# Patient Record
Sex: MALE | Race: WHITE | NOT HISPANIC OR LATINO | Employment: OTHER | URBAN - METROPOLITAN AREA
[De-identification: names, ages, dates, MRNs, and addresses within clinical notes are randomized per-mention and may not be internally consistent; named-entity substitution may affect disease eponyms.]

---

## 2017-11-19 ENCOUNTER — OFFICE VISIT (OUTPATIENT)
Dept: URGENT CARE | Facility: URGENT CARE | Age: 67
End: 2017-11-19
Payer: COMMERCIAL

## 2017-11-19 VITALS
BODY MASS INDEX: 25.24 KG/M2 | HEART RATE: 66 BPM | OXYGEN SATURATION: 96 % | SYSTOLIC BLOOD PRESSURE: 110 MMHG | WEIGHT: 166 LBS | DIASTOLIC BLOOD PRESSURE: 70 MMHG | RESPIRATION RATE: 12 BRPM | TEMPERATURE: 98.1 F

## 2017-11-19 DIAGNOSIS — R82.90 NONSPECIFIC FINDING ON EXAMINATION OF URINE: ICD-10-CM

## 2017-11-19 DIAGNOSIS — R30.0 DYSURIA: Primary | ICD-10-CM

## 2017-11-19 LAB
ALBUMIN UR-MCNC: 30 MG/DL
APPEARANCE UR: ABNORMAL
BACTERIA #/AREA URNS HPF: ABNORMAL /HPF
BILIRUB UR QL STRIP: NEGATIVE
COLOR UR AUTO: YELLOW
GLUCOSE UR STRIP-MCNC: NEGATIVE MG/DL
HGB UR QL STRIP: ABNORMAL
KETONES UR STRIP-MCNC: NEGATIVE MG/DL
LEUKOCYTE ESTERASE UR QL STRIP: ABNORMAL
NITRATE UR QL: POSITIVE
PH UR STRIP: 5.5 PH (ref 5–7)
RBC #/AREA URNS AUTO: ABNORMAL /HPF
SOURCE: ABNORMAL
SP GR UR STRIP: <=1.005 (ref 1–1.03)
UROBILINOGEN UR STRIP-ACNC: 0.2 EU/DL (ref 0.2–1)
WBC #/AREA URNS AUTO: >100 /HPF

## 2017-11-19 PROCEDURE — 87086 URINE CULTURE/COLONY COUNT: CPT | Performed by: FAMILY MEDICINE

## 2017-11-19 PROCEDURE — 87186 SC STD MICRODIL/AGAR DIL: CPT | Performed by: FAMILY MEDICINE

## 2017-11-19 PROCEDURE — 87088 URINE BACTERIA CULTURE: CPT | Performed by: FAMILY MEDICINE

## 2017-11-19 PROCEDURE — 81001 URINALYSIS AUTO W/SCOPE: CPT | Performed by: FAMILY MEDICINE

## 2017-11-19 PROCEDURE — 99213 OFFICE O/P EST LOW 20 MIN: CPT | Performed by: FAMILY MEDICINE

## 2017-11-19 NOTE — PROGRESS NOTES
SUBJECTIVE:   Wiley Storey is a 67 year old male who presents to clinic today for the following health issues:      Genitourinary symptoms      Duration: sx came back again last night    Description:  dysuria and urgency, cloudy urine    Intensity:  moderate    Accompanying signs and symptoms (fever/discharge/nausea/vomiting/back or abdominal pain):  Possible prostate pain?    History (frequent UTI's/kidney stones/prostate problems): Just finished medication 1 week ago, does have hx of prostatitis   Sexually active:     Precipitating or alleviating factors: None    Therapies tried and outcome: course of antibiotics -Bactrim    Outcome: did come back does now have another med for prostate continue to take?, did take cranberry pill        OBJECTIVE: Appears well, in no apparent distress.  Vital signs are normal. The abdomen is soft without tenderness, guarding, mass, rebound or organomegaly. No CVA tenderness or inguinal adenopathy noted.     Results for orders placed or performed in visit on 11/19/17   *UA reflex to Microscopic and Culture ([facility] [facility] (except [place] and [place])   Result Value Ref Range    Color Urine Yellow     Appearance Urine Cloudy     Glucose Urine Negative NEG^Negative mg/dL    Bilirubin Urine Negative NEG^Negative    Ketones Urine Negative NEG^Negative mg/dL    Specific Gravity Urine <=1.005 1.003 - 1.035    Blood Urine Moderate (A) NEG^Negative    pH Urine 5.5 5.0 - 7.0 pH    Protein Albumin Urine 30 (A) NEG^Negative mg/dL    Urobilinogen Urine 0.2 0.2 - 1.0 EU/dL    Nitrite Urine Positive (A) NEG^Negative    Leukocyte Esterase Urine Moderate (A) NEG^Negative    Source Midstream Urine    Urine Microscopic   Result Value Ref Range    WBC Urine >100 (A) OTO2^O - 2 /HPF    RBC Urine 2-5 (A) OTO2^O - 2 /HPF    Bacteria Urine Many (A) NEG^Negative /HPF   Urine Culture Aerobic Bacterial   Result Value Ref Range    Specimen Description Midstream Urine     Culture Micro  >100,000 colonies/mL  Escherichia coli   (A)        Susceptibility    Escherichia coli - ALBERT     AMPICILLIN 4 Sensitive ug/mL     CEFAZOLIN* <=4 Sensitive ug/mL      * Cefazolin ALBERT breakpoints are for the treatment of uncomplicated urinary tract infections.  For the treatment of systemic infections, please contact the laboratory for additional testing.     CEFOXITIN <=4 Sensitive ug/mL     CEFTAZIDIME <=1 Sensitive ug/mL     CEFTRIAXONE <=1 Sensitive ug/mL     CIPROFLOXACIN <=0.25 Sensitive ug/mL     GENTAMICIN <=1 Sensitive ug/mL     LEVOFLOXACIN <=0.12 Sensitive ug/mL     NITROFURANTOIN <=16 Sensitive ug/mL     TOBRAMYCIN <=1 Sensitive ug/mL     Trimethoprim/Sulfa <=1/19 Sensitive ug/mL     AMPICILLIN/SULBACTAM <=2 Sensitive ug/mL     Piperacillin/Tazo <=4 Sensitive ug/mL     CEFEPIME <=1 Sensitive ug/mL         ASSESSMENT: UTI uncomplicated without evidence of pyelonephritis    PLAN: Treatment per orders - also push fluids, may use Pyridium OTC prn. Call or return to clinic prn if these symptoms worsen or fail to improve as anticipated.    Needs follow-up this week with primary care

## 2017-11-21 LAB
BACTERIA SPEC CULT: ABNORMAL
SPECIMEN SOURCE: ABNORMAL

## 2017-11-22 ENCOUNTER — TELEPHONE (OUTPATIENT)
Dept: FAMILY MEDICINE | Facility: CLINIC | Age: 67
End: 2017-11-22

## 2017-11-22 NOTE — TELEPHONE ENCOUNTER
Patient called requesting lab results from  states he forgot his password for mychart, printed and mailed to pt   Karina Sage MA

## 2019-11-04 ENCOUNTER — HEALTH MAINTENANCE LETTER (OUTPATIENT)
Age: 69
End: 2019-11-04

## 2020-02-16 ENCOUNTER — HEALTH MAINTENANCE LETTER (OUTPATIENT)
Age: 70
End: 2020-02-16

## 2020-11-22 ENCOUNTER — HEALTH MAINTENANCE LETTER (OUTPATIENT)
Age: 70
End: 2020-11-22

## 2021-04-04 ENCOUNTER — HEALTH MAINTENANCE LETTER (OUTPATIENT)
Age: 71
End: 2021-04-04

## 2021-07-16 ENCOUNTER — TRANSFERRED RECORDS (OUTPATIENT)
Dept: HEALTH INFORMATION MANAGEMENT | Facility: CLINIC | Age: 71
End: 2021-07-16

## 2021-09-19 ENCOUNTER — HEALTH MAINTENANCE LETTER (OUTPATIENT)
Age: 71
End: 2021-09-19

## 2022-04-30 ENCOUNTER — HEALTH MAINTENANCE LETTER (OUTPATIENT)
Age: 72
End: 2022-04-30

## 2022-06-15 ENCOUNTER — TELEPHONE (OUTPATIENT)
Dept: CARDIOLOGY | Facility: CLINIC | Age: 72
End: 2022-06-15
Payer: COMMERCIAL

## 2022-06-21 ENCOUNTER — TELEPHONE (OUTPATIENT)
Dept: CARDIOLOGY | Facility: CLINIC | Age: 72
End: 2022-06-21
Payer: COMMERCIAL

## 2022-06-21 NOTE — TELEPHONE ENCOUNTER
Faxed records request to  [employer] asking for 2019 ECHO.   Also called and left message with same. Unsure if they can push to PACs or if the will send disc until I am able to speak with someone.

## 2022-06-22 ENCOUNTER — OFFICE VISIT (OUTPATIENT)
Dept: CARDIOLOGY | Facility: CLINIC | Age: 72
End: 2022-06-22
Payer: COMMERCIAL

## 2022-06-22 VITALS
DIASTOLIC BLOOD PRESSURE: 84 MMHG | HEIGHT: 68 IN | HEART RATE: 60 BPM | BODY MASS INDEX: 22.88 KG/M2 | RESPIRATION RATE: 16 BRPM | WEIGHT: 151 LBS | SYSTOLIC BLOOD PRESSURE: 144 MMHG

## 2022-06-22 DIAGNOSIS — I35.0 AORTIC STENOSIS, MODERATE: ICD-10-CM

## 2022-06-22 DIAGNOSIS — Q23.0 AORTIC VALVE STENOSIS, CONGENITAL: Primary | ICD-10-CM

## 2022-06-22 PROCEDURE — 99203 OFFICE O/P NEW LOW 30 MIN: CPT | Performed by: THORACIC SURGERY (CARDIOTHORACIC VASCULAR SURGERY)

## 2022-06-22 RX ORDER — MAG HYDROX/ALUMINUM HYD/SIMETH 400-400-40
900 SUSPENSION, ORAL (FINAL DOSE FORM) ORAL DAILY
COMMUNITY

## 2022-06-22 NOTE — PATIENT INSTRUCTIONS
You were seen today in the [facility]    Schedule echocardiogram, and we will most likely refer to you the valve clinic to be evaluated for a TAVR.    Please call SHARYN Dumont surgery coordinator with any questions.  Thank you.    Phone [phone]  Fax [phone]

## 2022-06-22 NOTE — LETTER
Date:June 23, 2022      Patient was self referred, no letter generated. Do not send.        [facility] Health Information

## 2022-06-22 NOTE — LETTER
6/22/2022    No primary care provider on file.  No primary provider on file.    RE: Wiley Storey       Dear Colleague,     I had the pleasure of seeing Wiley Storey in the [facility].  ASKED BY REFERRING PHYSICIAN:   Patient is self-referred.    CHIEF COMPLAINT:  Calcified aortic valve    HPI: Wiley is a 71 year old male who underwent a quadruple vessel coronary artery bypass grafting procedure by me in 2012.  He has done well but recently had an echocardiogram which revealed calcific involvement of his aortic valve with a mean gradient of 29 mm Hg.  We do not have a copy of this echo.  The patient reports that he does not have any symptoms that he is aware of at present. He also has a history of atrial fibrillation and underwent a maze procedure as well. Of note the patient lives in [place], [place] most of the time where his wife works.    PAST MEDICAL HISTORY:  Past Medical History:   Diagnosis Date     Atrial fibrillation (H)     Maze 3/2012     CAD (coronary artery disease)     s/p CABG 3/2012-  LIMA to LAD and RSVGs to OM, D, and RPDA     GERD (gastroesophageal reflux disease)      GI bleed 1991     History of blood transfusion      Hyperlipidemia      Hypertension      NSVT (nonsustained ventricular tachycardia) (H)      Sleep apnea     uses CPAP     Sleep apnea     cpap     Thyroid disease     hyperthyroid       PAST SURGICAL HISTORY:  Past Surgical History:   Procedure Laterality Date     ANESTHESIA CARDIOVERSION  7/2/2012    Procedure: ANESTHESIA CARDIOVERSION;  Anesthesia Off site Cardioversion;  Surgeon: Provider, Generic Anesthesia;  [facility]     BYPASS GRAFT ARTERY CORONARY  3/8/2012    Procedure:BYPASS GRAFT ARTERY CORONARY; Median Sternotomy, Coronary Artery Bypass Graft X4 Using the Left Internal Mamary and Left Saphenous Vein with MAZE Procedure, and On Pump Oxygenator.; Surgeon:MICHOACANO HOANG; Location:[facility] OR     COLONOSCOPY N/A 8/16/2016    Procedure:  COLONOSCOPY;  Surgeon: Brad Peralta MD;  Location:  GI     COLONOSCOPY N/A 11/10/2016    Procedure: COLONOSCOPY;  Surgeon: Brad Peralta MD;  Location:  GI     MAZE PROCEDURE  3/8/2012    Procedure:MAZE PROCEDURE; Surgeon:MICHOACANO HOANG; Location:[facility] OR     ulcer operation         FAMILY HISTORY:   Family History   Problem Relation Age of Onset     Cardiovascular Unknown         aortic aneurysm, CAD     Cancer Mother 86        pancreatic     C.A.D. Mother         passed away at 86     C.A.D. Brother         stent     Diabetes Maternal Grandmother      Hypertension Brother        SOCIAL HISTORY:  Social History     Socioeconomic History     Marital status:      Spouse name: Not on file     Number of children: Not on file     Years of education: Not on file     Highest education level: Not on file   Occupational History     Not on file   Tobacco Use     Smoking status: Former Smoker     Types: Cigarettes     Quit date: 1983     Years since quittin.4     Smokeless tobacco: Never Used   Substance and Sexual Activity     Alcohol use: Yes     Comment: occ wine     Drug use: No     Sexual activity: Yes     Partners: Female   Other Topics Concern     Parent/sibling w/ CABG, MI or angioplasty before 65F 55M? No   Social History Narrative     Not on file     Social Determinants of Health     Financial Resource Strain: Not on file   Food Insecurity: Not on file   Transportation Needs: Not on file   Physical Activity: Not on file   Stress: Not on file   Social Connections: Not on file   Intimate Partner Violence: Not on file   Housing Stability: Not on file        ALLERGIES:   Allergies   Allergen Reactions     Aspirin      Early , took one dose of aspirin + ibuprofen and had severe GI bleed - hospitalization required     Ibuprofen      Early , took one dose of aspirin + ibuprofen and had severe GI bleed -hospitalization required       CURRENT MEDICATIONS:   Prescription Medications as  of 2022       Rx Number Disp Refills Start End Last Dispensed Date Next Fill Date Owning Pharmacy    aspirin EC 81 MG tablet  90 tablet 0 3/12/2012    [facility] - [place], [place] - [address] [place] [address]    Sig: Take 1 tablet by mouth daily.    Class: E-Prescribe    Route: Oral    Blood Pressure Monitoring (B-D ASSURE BPM/AUTO ARM CUFF) MISC  1 Device 0 2015    [id number] - [place], [place] - [address] AT [facility] &     Si Device daily    Class: E-Prescribe    Route: Does not apply    Cholecalciferol (VITAMIN D PO)            Sig: Take 3,000 Units by mouth daily 1 tab daily    Class: Historical    Route: Oral    CRANBERRY EXTRACT PO            Sig: Take 2 tablets by mouth daily    Class: Historical    Route: Oral    Multiple Vitamins-Minerals (MULTIVITAL PO)            Sig: Take by mouth daily 1 TABLET DAILY    Class: Historical    Route: Oral    ORDER FOR DME            Sig: Use your BiPAP device as directed by your provider.    Class: Historical    Phytosterol Esters (CHOLEST CARE PO)            Sig: Take 600 mg by mouth daily    Class: Historical    Route: Oral    saw palmetto 450 MG CAPS capsule            Sig: Take 900 mg by mouth daily    Class: Historical    Route: Oral    vitamin B complex with vitamin C (VITAMIN  B COMPLEX) tablet            Sig: Take 1 tablet by mouth daily    Class: Historical    Route: Oral    vitamin B-12 (CYANOCOBALAMIN) 1000 MCG tablet        [id number] - [place], [place] - [address] AT [facility] [address]    Sig: Take 1,000 mcg by mouth three times a week    Class: Historical    Route: Oral    vitamin C (ASCORBIC ACID) 1000 MG TABS            Sig: Take 500 mg by mouth daily.    Class: Historical    Route: Oral          REVIEW OF SYSTEMS:   Gen: denies frequent headaches, double/blurry vision, insomnia, fatigue, unexplained weight loss/gain. No previous anesthesia  "reactions.  CV: denies chest pain, shortness of breath, palpitations, peripheral edema.    Pulm: denies shortness of breath, asthma or wheezing  GI/: denies liver or kidney problems, blood in stool or BRBPR, difficulty urinating  Endo: denies thyroid problems or Diabetes  Heme/Onc: denies bleeding or clotting disorders, no family problems with bleeding/clotting diorders  MS: no weakness, tremors or gait instability  Neuro: denies depression, memory problems, no dysesthesias, no previous strokes, no migraines, no dysphagia  Skin: No petechiae, purpura or rash.    PHYSICAL EXAMINATION:   BP (!) 144/84 (BP Location: Left arm, Patient Position: Sitting, Cuff Size: Adult Regular)   Pulse 60   Resp 16   Ht 1.727 m (5' 8\")   Wt 68.5 kg (151 lb)   BMI 22.96 kg/m    General: alert and oriented x 3, pleasant, no acute distress  CV: S1 S2, grade II/VI systolic ejection murmur heard best over the right upper sternal border, but no rubs or gallops, regular rate and rhythm, no peripheral edema, no carotid or abdominal bruits, pulses in upper and lower extremities palpable  Pulm: bilateral breath sounds, clear to auscultation, easy work of breathing  GI: (+) bowel sounds, soft non-tender and non-distended  : voiding without problems  MS: moves all extremities x 4,  5+/5+ equal strength bilaterally  Neuro: pupils equal round and reactive to light, cranial nerves, II-XII grossly intact, no gross neurologic deficits noted    LABS: Pending    PROCEDURES/IMAGING:  Chest X-Ray: Not done  Angio: Not done  Echo: Moderate to severe aortic stenosis  CT: Not done  MRI: Not done  Carotid US: Not done     ASSESSMENT/PLAN:   Wiley is a 71 year old gentleman who underwent a CABG x 4 and Maze procedure in 2012.  He now has some degree of aortic stenosis.  We do not have any of his studies and he is largely asymptomatic.  We plan to repeat his echocardiogram here and refer him to Valve Clinic as he would prefer to avoid another open " heart surgery.    1. Echocardiogram  2. Coronary angiogram  3. Refer to Valve Clinic    Approximately 30 minutes were spent with the patient in clinic at this visit.    CC  Patient Care Team:  Jade Robert as MD (Internal Medicine)  aJde Tolentino as PCP  STEVE LARA      Thank you for allowing me to participate in the care of your patient.      Sincerely,     Betsey Ross MD     [facility]  cc:   Referred MD Neymar  No address on file

## 2022-06-22 NOTE — PROGRESS NOTES
ASKED BY REFERRING PHYSICIAN:   Patient is self-referred.    CHIEF COMPLAINT:  Calcified aortic valve    HPI: Wiley is a 71 year old male who underwent a quadruple vessel coronary artery bypass grafting procedure by me in 2012.  He has done well but recently had an echocardiogram which revealed calcific involvement of his aortic valve with a mean gradient of 29 mm Hg.  We do not have a copy of this echo.  The patient reports that he does not have any symptoms that he is aware of at present. He also has a history of atrial fibrillation and underwent a maze procedure as well. Of note the patient lives in [place], [place] most of the time where his wife works.    PAST MEDICAL HISTORY:  Past Medical History:   Diagnosis Date     Atrial fibrillation (H)     Maze 3/2012     CAD (coronary artery disease)     s/p CABG 3/2012-  LIMA to LAD and RSVGs to OM, D, and RPDA     GERD (gastroesophageal reflux disease)      GI bleed 1991     History of blood transfusion      Hyperlipidemia      Hypertension      NSVT (nonsustained ventricular tachycardia) (H)      Sleep apnea     uses CPAP     Sleep apnea     cpap     Thyroid disease     hyperthyroid       PAST SURGICAL HISTORY:  Past Surgical History:   Procedure Laterality Date     ANESTHESIA CARDIOVERSION  7/2/2012    Procedure: ANESTHESIA CARDIOVERSION;  Anesthesia Off site Cardioversion;  Surgeon: Provider, Generic Anesthesia;  Location: [facility] OR     BYPASS GRAFT ARTERY CORONARY  3/8/2012    Procedure:BYPASS GRAFT ARTERY CORONARY; Median Sternotomy, Coronary Artery Bypass Graft X4 Using the Left Internal Mamary and Left Saphenous Vein with MAZE Procedure, and On Pump Oxygenator.; Surgeon:MICHOACANO HOANG; Location[facility] OR     COLONOSCOPY N/A 8/16/2016    Procedure: COLONOSCOPY;  Surgeon: Brad Peralta MD;  Location:  GI     COLONOSCOPY N/A 11/10/2016    Procedure: COLONOSCOPY;  Surgeon: Brad Peralta MD;  Location:  GI     MAZE PROCEDURE  3/8/2012    Procedure:MAZE  PROCEDURE; Surgeon:MICHOACANO HOANG; Location:[facility] OR     ulcer operation         FAMILY HISTORY:   Family History   Problem Relation Age of Onset     Cardiovascular Unknown         aortic aneurysm, CAD     Cancer Mother 86        pancreatic     C.A.D. Mother         passed away at 86     C.A.D. Brother         stent     Diabetes Maternal Grandmother      Hypertension Brother        SOCIAL HISTORY:  Social History     Socioeconomic History     Marital status:      Spouse name: Not on file     Number of children: Not on file     Years of education: Not on file     Highest education level: Not on file   Occupational History     Not on file   Tobacco Use     Smoking status: Former Smoker     Types: Cigarettes     Quit date: 1983     Years since quittin.4     Smokeless tobacco: Never Used   Substance and Sexual Activity     Alcohol use: Yes     Comment: occ wine     Drug use: No     Sexual activity: Yes     Partners: Female   Other Topics Concern     Parent/sibling w/ CABG, MI or angioplasty before 65F 55M? No   Social History Narrative     Not on file     Social Determinants of Health     Financial Resource Strain: Not on file   Food Insecurity: Not on file   Transportation Needs: Not on file   Physical Activity: Not on file   Stress: Not on file   Social Connections: Not on file   Intimate Partner Violence: Not on file   Housing Stability: Not on file        ALLERGIES:   Allergies   Allergen Reactions     Aspirin      Early , took one dose of aspirin + ibuprofen and had severe GI bleed - hospitalization required     Ibuprofen      Early , took one dose of aspirin + ibuprofen and had severe GI bleed -hospitalization required       CURRENT MEDICATIONS:   Prescription Medications as of 2022       Rx Number Disp Refills Start End Last Dispensed Date Next Fill Date Owning Pharmacy    aspirin EC 81 MG tablet  90 tablet 0 3/12/2012    [facility] - [place], [place] - [facility]    Sig: Take 1 tablet by mouth daily.    Class: E-Prescribe    Route: Oral    Blood Pressure Monitoring (B-D ASSURE BPM/AUTO ARM CUFF) MISC  1 Device 0 2015    [id number] - [place], [place] - [address] AT [facility] &     Si Device daily    Class: E-Prescribe    Route: Does not apply    Cholecalciferol (VITAMIN D PO)            Sig: Take 3,000 Units by mouth daily 1 tab daily    Class: Historical    Route: Oral    CRANBERRY EXTRACT PO            Sig: Take 2 tablets by mouth daily    Class: Historical    Route: Oral    Multiple Vitamins-Minerals (MULTIVITAL PO)            Sig: Take by mouth daily 1 TABLET DAILY    Class: Historical    Route: Oral    ORDER FOR DME            Sig: Use your BiPAP device as directed by your provider.    Class: Historical    Phytosterol Esters (CHOLEST CARE PO)            Sig: Take 600 mg by mouth daily    Class: Historical    Route: Oral    saw palmetto 450 MG CAPS capsule            Sig: Take 900 mg by mouth daily    Class: Historical    Route: Oral    vitamin B complex with vitamin C (VITAMIN  B COMPLEX) tablet            Sig: Take 1 tablet by mouth daily    Class: Historical    Route: Oral    vitamin B-12 (CYANOCOBALAMIN) 1000 MCG tablet        [email or web address] #[id number] - [place], [place] - [address] AT [facility] & [facility]    Sig: Take 1,000 mcg by mouth three times a week    Class: Historical    Route: Oral    vitamin C (ASCORBIC ACID) 1000 MG TABS            Sig: Take 500 mg by mouth daily.    Class: Historical    Route: Oral          REVIEW OF SYSTEMS:   Gen: denies frequent headaches, double/blurry vision, insomnia, fatigue, unexplained weight loss/gain. No previous anesthesia reactions.  CV: denies chest pain, shortness of breath, palpitations, peripheral edema.    Pulm: denies shortness of breath, asthma or wheezing  GI/: denies liver or kidney problems, blood in stool or BRBPR, difficulty  "urinating  Endo: denies thyroid problems or Diabetes  Heme/Onc: denies bleeding or clotting disorders, no family problems with bleeding/clotting diorders  MS: no weakness, tremors or gait instability  Neuro: denies depression, memory problems, no dysesthesias, no previous strokes, no migraines, no dysphagia  Skin: No petechiae, purpura or rash.    PHYSICAL EXAMINATION:   BP (!) 144/84 (BP Location: Left arm, Patient Position: Sitting, Cuff Size: Adult Regular)   Pulse 60   Resp 16   Ht 1.727 m (5' 8\")   Wt 68.5 kg (151 lb)   BMI 22.96 kg/m    General: alert and oriented x 3, pleasant, no acute distress  CV: S1 S2, grade II/VI systolic ejection murmur heard best over the right upper sternal border, but no rubs or gallops, regular rate and rhythm, no peripheral edema, no carotid or abdominal bruits, pulses in upper and lower extremities palpable  Pulm: bilateral breath sounds, clear to auscultation, easy work of breathing  GI: (+) bowel sounds, soft non-tender and non-distended  : voiding without problems  MS: moves all extremities x 4,  5+/5+ equal strength bilaterally  Neuro: pupils equal round and reactive to light, cranial nerves, II-XII grossly intact, no gross neurologic deficits noted    LABS: Pending    PROCEDURES/IMAGING:  Chest X-Ray: Not done  Angio: Not done  Echo: Moderate to severe aortic stenosis  CT: Not done  MRI: Not done  Carotid US: Not done     ASSESSMENT/PLAN:   Wiley is a 71 year old gentleman who underwent a CABG x 4 and Maze procedure in 2012.  He now has some degree of aortic stenosis.  We do not have any of his studies and he is largely asymptomatic.  We plan to repeat his echocardiogram here and refer him to Valve Clinic as he would prefer to avoid another open heart surgery.    1. Echocardiogram  2. Coronary angiogram  3. Refer to Valve Clinic    Approximately 30 minutes were spent with the patient in clinic at this visit.    CC  Patient Care Team:  Jade Robert MD (Internal " Medicine)  Jade Tolentino as PCP  SELF, REFERRED

## 2022-06-28 ENCOUNTER — TELEPHONE (OUTPATIENT)
Dept: CARDIOLOGY | Facility: CLINIC | Age: 72
End: 2022-06-28

## 2022-06-28 NOTE — TELEPHONE ENCOUNTER
Have been unable to speak with a person at [facility] Information about image request sent 6/21. Will continue to try.

## 2022-07-06 ENCOUNTER — HOSPITAL ENCOUNTER (OUTPATIENT)
Dept: CARDIOLOGY | Facility: CLINIC | Age: 72
Discharge: HOME | End: 2022-07-06
Attending: THORACIC SURGERY (CARDIOTHORACIC VASCULAR SURGERY) | Admitting: THORACIC SURGERY (CARDIOTHORACIC VASCULAR SURGERY)
Payer: COMMERCIAL

## 2022-07-06 DIAGNOSIS — I35.0 AORTIC STENOSIS, MODERATE: ICD-10-CM

## 2022-07-06 LAB
LVEF ECHO: NORMAL
LVEF ECHO: NORMAL

## 2022-07-06 PROCEDURE — 93306 TTE W/DOPPLER COMPLETE: CPT

## 2022-07-06 PROCEDURE — 93306 TTE W/DOPPLER COMPLETE: CPT | Mod: 26 | Performed by: INTERNAL MEDICINE

## 2022-07-13 ENCOUNTER — TELEPHONE (OUTPATIENT)
Dept: CARDIOLOGY | Facility: CLINIC | Age: 72
End: 2022-07-13

## 2022-07-13 DIAGNOSIS — I35.0 AORTIC STENOSIS, MODERATE: Primary | ICD-10-CM

## 2022-07-13 NOTE — TELEPHONE ENCOUNTER
Dr. Ross reviewed echo and agrees the patient should be evaluated for TAVR. Patient was sent a [facility] message about this. Patient info sent to valve clinic for scheduling.

## 2022-11-20 ENCOUNTER — HEALTH MAINTENANCE LETTER (OUTPATIENT)
Age: 72
End: 2022-11-20

## 2023-06-02 ENCOUNTER — HEALTH MAINTENANCE LETTER (OUTPATIENT)
Age: 73
End: 2023-06-02

## 2024-02-01 ENCOUNTER — TELEPHONE (OUTPATIENT)
Dept: CARDIOLOGY | Facility: CLINIC | Age: 74
End: 2024-02-01
Payer: COMMERCIAL

## 2024-02-01 NOTE — TELEPHONE ENCOUNTER
Grace, [phone] cardiology department from the [facility] is looking to establish care with a [facility] in the [place] location. Patient had not been seen by assigned provider in over seven years.   [place] has no provider appointment on Monday.  Spoke to Dr. Saravia who has available appt and can see patient.  RN shared with Grace to please have cardiology order the INR for initial and provider will see patient, get to know history and monitor from there on.  Clinic fax number provided for the INR order lab drawn.  Patient is scheduled with Dr. Saravia for 2/5/24 at 5pm.  Lab at 4:15.  Grace will relay appointments information to patient.    OMEGA Lino, RN  [facility]

## 2024-02-01 NOTE — TELEPHONE ENCOUNTER
Writer received orders from [facility] and gave to lab to enter in patients chart.    Khadra Sage

## 2024-02-02 ENCOUNTER — TELEPHONE (OUTPATIENT)
Dept: CARDIOLOGY | Facility: CLINIC | Age: 74
End: 2024-02-02
Payer: COMMERCIAL

## 2024-02-02 LAB — INR (EXTERNAL): 1.2 (ref 0.9–1.1)

## 2024-02-02 NOTE — TELEPHONE ENCOUNTER
"Writer received a note from another nurse colleague as indicated below to assist with this encounter.     \"Please call Grace\" [phone] to have this patient go to [facility] on Saturday before noon (2/03/2024) to get the INR/PT drawn for result to be readily available at the time of his appointment with Dr. Saravia on Monday at 5 PM.    Please call Grace before 10:00 AM so she can prepare patient for lab work before he discharge from [facility].\"    Chart reviewed, it appears patient has an upcoming hospital follow up appointment scheduled with Dr. Saravia on 2/05/2024 @ 5:00 PM. Pt also has a lab only appointment at 4:15 PM at [facility].    Called and spoke to Grace regarding message above. [facility] address given to Grace.     [address] [place] [id number]  [facility] Laboratory Hours  M-F 7 AM - 5 PM  Saturday 9 AM - 1 PM    Explained to Grace that the INR lab order is already in patient's chart. Pt does NOT need a lab appt at [facility]. Pt can simply walk-in. Grace will relay the following information to patient.    No further action needed.    Closing encounter.    EWELINA KatzN, RN   [facility]        "

## 2024-02-02 NOTE — TELEPHONE ENCOUNTER
A user error has taken place: encounter opened in error, closed for administrative reasons.    EWELINA KatzN, RN   [facility]

## 2024-02-05 ENCOUNTER — APPOINTMENT (OUTPATIENT)
Dept: ULTRASOUND IMAGING | Facility: HOSPITAL | Age: 74
End: 2024-02-05
Attending: STUDENT IN AN ORGANIZED HEALTH CARE EDUCATION/TRAINING PROGRAM
Payer: COMMERCIAL

## 2024-02-05 ENCOUNTER — OFFICE VISIT (OUTPATIENT)
Dept: FAMILY MEDICINE | Facility: CLINIC | Age: 74
End: 2024-02-05
Payer: COMMERCIAL

## 2024-02-05 ENCOUNTER — HOSPITAL ENCOUNTER (EMERGENCY)
Facility: HOSPITAL | Age: 74
Discharge: HOME | End: 2024-02-05
Attending: STUDENT IN AN ORGANIZED HEALTH CARE EDUCATION/TRAINING PROGRAM | Admitting: STUDENT IN AN ORGANIZED HEALTH CARE EDUCATION/TRAINING PROGRAM
Payer: COMMERCIAL

## 2024-02-05 VITALS
HEART RATE: 58 BPM | WEIGHT: 156 LBS | TEMPERATURE: 97.8 F | RESPIRATION RATE: 18 BRPM | OXYGEN SATURATION: 98 % | HEIGHT: 68 IN | DIASTOLIC BLOOD PRESSURE: 81 MMHG | SYSTOLIC BLOOD PRESSURE: 148 MMHG | BODY MASS INDEX: 23.64 KG/M2

## 2024-02-05 VITALS
WEIGHT: 158.1 LBS | BODY MASS INDEX: 24.04 KG/M2 | SYSTOLIC BLOOD PRESSURE: 127 MMHG | RESPIRATION RATE: 21 BRPM | DIASTOLIC BLOOD PRESSURE: 77 MMHG | TEMPERATURE: 97.7 F | OXYGEN SATURATION: 98 % | HEART RATE: 54 BPM

## 2024-02-05 DIAGNOSIS — I10 HYPERTENSION GOAL BP (BLOOD PRESSURE) < 140/90: ICD-10-CM

## 2024-02-05 DIAGNOSIS — Z95.1 HISTORY OF CORONARY ARTERY BYPASS SURGERY: ICD-10-CM

## 2024-02-05 DIAGNOSIS — Z79.01 WARFARIN ANTICOAGULATION: ICD-10-CM

## 2024-02-05 DIAGNOSIS — R79.1 SUBTHERAPEUTIC INTERNATIONAL NORMALIZED RATIO (INR): ICD-10-CM

## 2024-02-05 DIAGNOSIS — Z09 HOSPITAL DISCHARGE FOLLOW-UP: Primary | ICD-10-CM

## 2024-02-05 DIAGNOSIS — R19.09 GROIN SWELLING: ICD-10-CM

## 2024-02-05 DIAGNOSIS — H93.13 TINNITUS OF BOTH EARS: ICD-10-CM

## 2024-02-05 DIAGNOSIS — E78.5 HYPERLIPIDEMIA LDL GOAL <70: ICD-10-CM

## 2024-02-05 DIAGNOSIS — Q23.81 BICUSPID AORTIC VALVE: ICD-10-CM

## 2024-02-05 DIAGNOSIS — N40.0 BENIGN PROSTATIC HYPERPLASIA WITHOUT URINARY OBSTRUCTION: ICD-10-CM

## 2024-02-05 DIAGNOSIS — D69.6 THROMBOCYTOPENIA (H): ICD-10-CM

## 2024-02-05 DIAGNOSIS — Z95.2 HISTORY OF TRANSCATHETER AORTIC VALVE IMPLANTATION (TAVI): ICD-10-CM

## 2024-02-05 DIAGNOSIS — Z87.19 HISTORY OF GI BLEED: ICD-10-CM

## 2024-02-05 DIAGNOSIS — I48.91 ATRIAL FIBRILLATION, UNSPECIFIED TYPE (H): ICD-10-CM

## 2024-02-05 PROBLEM — R09.89 LABILE HYPERTENSION DUE TO CLINICAL ENVIRONMENT: Status: ACTIVE | Noted: 2022-02-14

## 2024-02-05 PROBLEM — F17.201 TOBACCO ABUSE, IN REMISSION: Status: ACTIVE | Noted: 2022-12-05

## 2024-02-05 PROBLEM — C44.90 NON-MELANOMA SKIN CANCER: Status: ACTIVE | Noted: 2024-02-05

## 2024-02-05 PROBLEM — L43.9 LICHEN PLANUS: Status: ACTIVE | Noted: 2022-02-11

## 2024-02-05 PROBLEM — Z86.79 HISTORY OF ATRIAL FIBRILLATION: Chronic | Status: ACTIVE | Noted: 2019-07-11

## 2024-02-05 PROBLEM — N18.2 STAGE 2 CHRONIC KIDNEY DISEASE: Status: ACTIVE | Noted: 2024-02-01

## 2024-02-05 PROBLEM — D72.819 LEUKOPENIA: Status: ACTIVE | Noted: 2022-11-07

## 2024-02-05 PROBLEM — L71.9 ROSACEA: Chronic | Status: ACTIVE | Noted: 2019-07-11

## 2024-02-05 PROBLEM — G43.109 MIGRAINE WITH AURA: Chronic | Status: ACTIVE | Noted: 2019-09-23

## 2024-02-05 PROBLEM — H93.19 TINNITUS: Status: ACTIVE | Noted: 2019-07-11

## 2024-02-05 PROBLEM — G47.31 PRIMARY CENTRAL SLEEP APNEA: Status: ACTIVE | Noted: 2017-02-07

## 2024-02-05 PROBLEM — G43.109 OPHTHALMIC MIGRAINE: Status: ACTIVE | Noted: 2022-02-14

## 2024-02-05 PROBLEM — I35.0 MILD AORTIC STENOSIS: Chronic | Status: ACTIVE | Noted: 2019-07-11

## 2024-02-05 PROBLEM — N39.0 RECURRENT UTI: Status: ACTIVE | Noted: 2018-08-01

## 2024-02-05 PROBLEM — I50.32 CHRONIC DIASTOLIC (CONGESTIVE) HEART FAILURE (H): Status: ACTIVE | Noted: 2024-02-01

## 2024-02-05 PROBLEM — L66.12 FRONTAL FIBROSING ALOPECIA: Chronic | Status: ACTIVE | Noted: 2019-09-23

## 2024-02-05 LAB
ANION GAP SERPL CALCULATED.3IONS-SCNC: 10 MMOL/L (ref 7–15)
BASOPHILS # BLD AUTO: 0.1 10E3/UL (ref 0–0.2)
BASOPHILS NFR BLD AUTO: 1 %
BUN SERPL-MCNC: 19.8 MG/DL (ref 8–23)
CALCIUM SERPL-MCNC: 8.5 MG/DL (ref 8.8–10.2)
CHLORIDE SERPL-SCNC: 105 MMOL/L (ref 98–107)
CREAT SERPL-MCNC: 1.07 MG/DL (ref 0.67–1.17)
DEPRECATED HCO3 PLAS-SCNC: 24 MMOL/L (ref 22–29)
EGFRCR SERPLBLD CKD-EPI 2021: 72 ML/MIN/1.73M2
EOSINOPHIL # BLD AUTO: 0.2 10E3/UL (ref 0–0.7)
EOSINOPHIL NFR BLD AUTO: 4 %
ERYTHROCYTE [DISTWIDTH] IN BLOOD BY AUTOMATED COUNT: 13.3 % (ref 10–15)
GLUCOSE SERPL-MCNC: 100 MG/DL (ref 70–99)
HCT VFR BLD AUTO: 43.9 % (ref 40–53)
HGB BLD-MCNC: 14.9 G/DL (ref 13.3–17.7)
IMM GRANULOCYTES # BLD: 0 10E3/UL
IMM GRANULOCYTES NFR BLD: 0 %
INR PPP: 1.6 (ref 0.85–1.15)
LYMPHOCYTES # BLD AUTO: 0.9 10E3/UL (ref 0.8–5.3)
LYMPHOCYTES NFR BLD AUTO: 18 %
MCH RBC QN AUTO: 32.3 PG (ref 26.5–33)
MCHC RBC AUTO-ENTMCNC: 33.9 G/DL (ref 31.5–36.5)
MCV RBC AUTO: 95 FL (ref 78–100)
MONOCYTES # BLD AUTO: 0.6 10E3/UL (ref 0–1.3)
MONOCYTES NFR BLD AUTO: 11 %
NEUTROPHILS # BLD AUTO: 3.3 10E3/UL (ref 1.6–8.3)
NEUTROPHILS NFR BLD AUTO: 66 %
NRBC # BLD AUTO: 0 10E3/UL
NRBC BLD AUTO-RTO: 0 /100
PLATELET # BLD AUTO: 95 10E3/UL (ref 150–450)
POTASSIUM SERPL-SCNC: 3.9 MMOL/L (ref 3.4–5.3)
RBC # BLD AUTO: 4.61 10E6/UL (ref 4.4–5.9)
SODIUM SERPL-SCNC: 139 MMOL/L (ref 135–145)
WBC # BLD AUTO: 5.1 10E3/UL (ref 4–11)

## 2024-02-05 PROCEDURE — 36415 COLL VENOUS BLD VENIPUNCTURE: CPT | Performed by: STUDENT IN AN ORGANIZED HEALTH CARE EDUCATION/TRAINING PROGRAM

## 2024-02-05 PROCEDURE — 99205 OFFICE O/P NEW HI 60 MIN: CPT | Performed by: FAMILY MEDICINE

## 2024-02-05 PROCEDURE — 82374 ASSAY BLOOD CARBON DIOXIDE: CPT | Performed by: STUDENT IN AN ORGANIZED HEALTH CARE EDUCATION/TRAINING PROGRAM

## 2024-02-05 PROCEDURE — 85610 PROTHROMBIN TIME: CPT | Performed by: STUDENT IN AN ORGANIZED HEALTH CARE EDUCATION/TRAINING PROGRAM

## 2024-02-05 PROCEDURE — 85025 COMPLETE CBC W/AUTO DIFF WBC: CPT | Performed by: STUDENT IN AN ORGANIZED HEALTH CARE EDUCATION/TRAINING PROGRAM

## 2024-02-05 PROCEDURE — 93971 EXTREMITY STUDY: CPT

## 2024-02-05 PROCEDURE — 93005 ELECTROCARDIOGRAM TRACING: CPT | Performed by: STUDENT IN AN ORGANIZED HEALTH CARE EDUCATION/TRAINING PROGRAM

## 2024-02-05 PROCEDURE — 99285 EMERGENCY DEPT VISIT HI MDM: CPT | Mod: 25

## 2024-02-05 RX ORDER — TAMSULOSIN HYDROCHLORIDE 0.4 MG/1
0.4 CAPSULE ORAL DAILY
COMMUNITY
End: 2024-02-05

## 2024-02-05 RX ORDER — CLOPIDOGREL BISULFATE 75 MG/1
75 TABLET ORAL DAILY
COMMUNITY
Start: 2024-01-30 | End: 2024-02-05

## 2024-02-05 RX ORDER — EZETIMIBE 10 MG/1
10 TABLET ORAL DAILY
Qty: 90 TABLET | Refills: 1 | Status: SHIPPED | OUTPATIENT
Start: 2024-02-05 | End: 2024-04-29

## 2024-02-05 RX ORDER — EZETIMIBE 10 MG/1
10 TABLET ORAL DAILY
COMMUNITY
Start: 2024-01-04 | End: 2024-02-05

## 2024-02-05 RX ORDER — WARFARIN SODIUM 2 MG/1
3 TABLET ORAL DAILY
COMMUNITY
Start: 2024-02-02 | End: 2024-02-29

## 2024-02-05 RX ORDER — AMOXICILLIN 500 MG/1
CAPSULE ORAL
COMMUNITY
Start: 2024-02-02

## 2024-02-05 ASSESSMENT — ACTIVITIES OF DAILY LIVING (ADL): ADLS_ACUITY_SCORE: 35

## 2024-02-05 NOTE — PATIENT INSTRUCTIONS
-Thank you for choosing the [facility].  -It was a pleasure to see you today.  -Please take a look at the information below for more specific details regarding the treatment plan and recommendations.  -In this after visit summary is a list of your medications and specific instructions.  Please review this carefully as there may be changes made to your medication list.  -If there are any particular questions or concerns, please feel free to reach out to Dr. Saravia.  -If any labs have been completed, we will reach out to you about results.  If the results are normal or not concerning, a letter or [employer] message will be sent to you.  If any follow-up is needed, either Dr. Saravia or the nurse will give you a call.  If you have not heard regarding results after 2 weeks, please reach out to the clinic.    Patient Instructions:    -Continue on the medications as prescribed.   -You were referred to the anticoagulation team.  They will reach out to you to discuss treatment recommendations and adjust the medication dosing based on INR results.  If you do not hear from someone within the next couple of days, please reach out to Dr. Saravia.  -Continue to follow with the heart team at [facility].   -Drink 40-50 ounces of water each day.   -Continue to follow the recommendations given to you by the heart doctors.  -Continue to hold aspirin while you are taking the warfarin medication.  -Continue to monitor the stools for bloody or black colors.     -You were referred to the hearing doctor (audiologist).  -If you do not hear from the specialist to schedule an appointment within a week's time from today, please call the [facility] and speak with the specialty  to help you schedule the appointment to see the specialist.  Depending on the specialist availability, it may be a number of weeks prior to your scheduled appointment.      Please seek immediate medical attention (go to the emergency room or  urgent care) for the following reasons: worsening symptoms, or any concerning changes.      --------------------------------------------------------------------------------------------------------------------    -We are always looking for ways to improve.  You may be selected to receive a survey regarding your visit today.  We encourage you to complete the survey and provide specific, constructive feedback to help us improve our processes.  Thank you for your time!  -Please review the contact information listed on the after visit summary and in the electronic chart.  Below is the phone number that we have on file.  If there are any changes that are needed to be made, please reach out to the clinic.  [phone] (home) [phone] (work)

## 2024-02-05 NOTE — ED TRIAGE NOTES
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 Pt on Coumadin. Pt went to bed and felt good. About 430am pt rolled over on his back and noted hematoma on his right groin. No c/o shortness of breath, no c/o chest pain.   Triage Assessment (Adult)       Row Name 02/05/24 0523          Triage Assessment    Airway WDL WDL        Respiratory WDL    Respiratory WDL WDL        Skin Circulation/Temperature WDL    Skin Circulation/Temperature WDL WDL        Cardiac WDL    Cardiac WDL WDL        Peripheral/Neurovascular WDL    Peripheral Neurovascular WDL WDL        Cognitive/Neuro/Behavioral WDL    Cognitive/Neuro/Behavioral WDL WDL

## 2024-02-05 NOTE — PROGRESS NOTES
OFFICE VISIT    Assessment/Plan:     Patient Instructions:    -Continue on the medications as prescribed.   -You were referred to the anticoagulation team.  They will reach out to you to discuss treatment recommendations and adjust the medication dosing based on INR results.  If you do not hear from someone within the next couple of days, please reach out to Dr. Saravia.  -Continue to follow with the heart team at [facility].   -Drink 40-50 ounces of water each day.   -Continue to follow the recommendations given to you by the heart doctors.  -Continue to hold aspirin while you are taking the warfarin medication.  -Continue to monitor the stools for bloody or black colors.     -You were referred to the hearing doctor (audiologist).  -If you do not hear from the specialist to schedule an appointment within a week's time from today, please call the [facility] and speak with the specialty  to help you schedule the appointment to see the specialist.  Depending on the specialist availability, it may be a number of weeks prior to your scheduled appointment.      Please seek immediate medical attention (go to the emergency room or urgent care) for the following reasons: worsening symptoms, or any concerning changes.    Wiley was seen today for hospital f/u.  Diagnoses and all orders for this visit:    Hospital discharge follow-up  History of transcatheter aortic valve implantation (LASHAE)  Bicuspid aortic valve  Warfarin anticoagulation  History of GI bleed  Comments:  In 1990's after taking aspirin/ibuprofen.  Thrombocytopenia (H24):  Patient recommended to continue on the 3 mg dose in the evening for the warfarin medication.  Plans will be for patient to return in 2-3 days for recheck of the INR level.  Further recommendations pending results.  Continues to have thrombocytopenia.  Patient currently taking warfarin.  It does appear that cardiology team would like to continue patient on warfarin until 3 months  after procedure (end on 05/01/2024) and then start patient on baby aspirin and continue lifelong.  Patient has been monitoring closely for any signs of bleeding as patient has had melena in the past due to combination use of ibuprofen and aspirin.    -     Anticoagulation Clinic Referral  -     INR; Future    Hypertension goal BP (blood pressure) < 140/90: On recheck, blood pressure is 148/81.  This is significantly improved from the at 185/95 measurement noted in the emergency room earlier today.  On discharge to the hospital, blood pressure was 121/83.  This could be related to the discomforts that patient has been having in the right groin area from the hematoma that was noted.  Patient is also new to the clinic and this provider, which also could be contributing.  Plan to monitor.  Patient was recommended through the nurse to monitor blood pressures closely over the next 2 weeks and bring these measurements in for the next visit.  Elevation is mild and patient is asymptomatic at this time.  Continue to monitor.    Hyperlipidemia LDL goal <70  History of coronary artery bypass surgery: Stable.  Asymptomatic at this time.  Continue Zetia.  New prescription sent.  Patient plans to continue to follow with cardiology team from [facility].  -     Anticoagulation Clinic Referral  -     ezetimibe (ZETIA) 10 MG tablet; Take 1 tablet (10 mg) by mouth daily    Atrial fibrillation, unspecified type (H): Atrial fibrillation is a remote history and noted after the CABG procedure in 2012.  The atrial fibrillation resolved after cardioversion.  He has not been on any anticoagulation medication.  The EKG in the ER today was read as normal sinus rhythm.  -     Anticoagulation Clinic Referral    Benign prostatic hyperplasia without urinary obstruction: Patient reports taking saw palmetto and cranberry extract and this has been effective in addressing the BPH issues.  No longer taking tamsulosin.  Continue cares as patient has  been doing.    Tinnitus of both ears: Has been going on for a while, though patient wondering if related to aspirin use.  Referral placed as below for further evaluation and recommendations.  -     Adult Audiology  Referral; Future        Return in about 2 weeks (around 2/19/2024) for Medication follow up.    The diagnoses, treatment options, risk, benefits, and recommendations were reviewed with patient/guardian.  Questions were answered to patient's/guardian satisfaction.  Red flag signs were reviewed.  Patient/guardian is in agreement with above plan.      Subjective: 73 year old male with history of CABG x4v (2012 at [facility]), bicuspid aortic valve with stenosis (nonrheumatic) s/p LASHAE, chronic diastolic congestive heart failure, atrial fibrillation (post CABG; resolved after cardioversion; not on anticoagulation), hyperlipidemia, hypertension, central sleep apnea on EPAP, hypothyroidism, migraine with aura, CKD stage II, history of tobacco abuse in remission, who presents to clinic for the following complaints:   Patient presents with:  Hospital F/U: From ED visit this morning to get on Warfarin     Patient is at the clinic to establish cares.    Patient was admitted to [facility] from 2/1/2024 - 2/2/2024 with principal diagnosis of stenosis aortic valve required.    DISMISSAL DIAGNOSES:   #1 Stenosis Aortic Valve Acquired  #2 Chronic Diastolic (Congestive) Heart Failure (HCC)  #3 Bicuspid Aortic Valve (HCC)  #4 Benign Prostatic Hyperplasia Without Obstruction  #5 Primary Central Sleep Apnea  #6 Hypertension Essential Primary  #7 Hyperlipidemia  #8 Coronary Arterial Bypass Graft Status Post Personal History  #9 Chronic Kidney Disease Stage 2 Glomerular Filtration Rate 60 To 89     RECOMMENDATIONS FOR FOLLOW-UP APPOINTMENTS:   - Assess access site: Right and left femoral, right radial  - BMP including creatinine  - ECG to assess IN and QRS interval  - Follow and manage warfarin therapy  - hold aspirin  "81 mg daily while on warfarin therapy. Once warfarin therapy complete resume aspirin therapy.  - Encourage CV rehab participation  - No driving for 5 days following hospital dismissal  - SBE prophylaxis recommended lifelong     ANTICOAGULATION RECORD:     INR goal: 2.0-3.0  Warfarin duration and indication: 3 months following LASHAE  Patient Instructions: Take 3 mg of Warfarin on 2/2, 2/3 and 2/4, recheck INR on 2/3 with follow up on 2/5. Future doses per PCP.    Warfarin Administrations (last 168 hours)     Date/Time Action Medication Dose   02/01/24 1825 Given   warfarin tablet 3 mg (JANTOVEN) 3 mg         INR (no units)   Date Value Status   02/02/2024 1.2 Final   02/01/2024 1.2 Final       BACTERIAL ENDOCARDITIS PROPHYLAXIS  *The following recommendations should be observed for LIFELONG after your procedure.    1. Take antibiotic(s) prior to interventional procedures or surgeries.   2. Observe good oral hygiene daily, as advised by your dentist. Ensure regular professional dental care.  3. Keep cuts and skin lesions clean.  4. Infections should be treated promptly.    Failure to follow these recommendations will expose you to risks of developing bacterial endocarditis, a serious heart valve infection.       Since discharge, patient has been doing well overall until this morning.  He had noticed some groin swelling and went to the emergency room for further evaluation.  At that time, he was noted to have a hematoma around the right groin area.  Otherwise, he had not noticed any other changes.  The hematoma was described as: Comments: Right groin has a pulsatile firm hematoma.  Not expanding during the time of examination.  Overlying subacute appearing ecchymosis.  Distal sensation, motor function, range of motion is normal and symmetric with the left side.  DP and PT pulses 2+ and symmetric with the left side.\"      EKG read by ER doctor: EKG is normal sinus rhythm at a rate of 71. No ST segment changes indicative " of emergent ischemia.  Provide today is unable to access the actual EKG result/image.  Testing notable for calcium 8.5, glucose 100, platelet count 95.  Otherwise, BMP and CBC were within normal limits.    Overall, he states that he is doing okay.  There has been no significant changes in the hematoma of the right groin since earlier today.  The bruising continues to be present in the groin and upper thigh region.  The left side has a small bruise, though not too bad.  No pain noted in the left side.  Denies any chest pain, or other changes from baseline.  The shortness of breath he was experiencing before the procedure is now much better.  He seems to be breathing better.  Denies any significant changes from the emergency room.    Currently, he is taking warfarin 3mg daily.     He had taken aspirin for awhile after the CABG and had been doing well without any issues or side effects on the medication.  He was stopped on aspirin after a while and then shortly before the procedure above, he was restarted on aspirin and then discontinued in preparation for the surgery.  Reviewed the plan/recommendations from the cardiology team.  Due to the history of bloody stools/melena, patient has been monitoring his stools pretty closely.  Currently, he denies bloody/black tarry stools.     Got connected with CV rehab and first appointment will be on 02/14/2024.  Reviewed precautions with limitations of activities.    The Saw Palmetto and cranberry extract has been really helpful for the BPH.  He has discontinued the tamsulosin.    Ear ringing: went to a doctor at [facility] 5 years ago and told that his hearing is deteriorating. Some tinnitus noted.  He was told that he probably has 3-5 years left and now he is about 5 years out.  He would like to see the hearing specialist.         11/19/2017  2:45 PM 6/22/2022  2:01 PM 2/5/2024  5:21 AM 2/5/2024  5:36 AM   Vital Signs       Systolic 110  144 !  187 !  185 !    Diastolic 70   "84 !  88 !  95 (H)    Pulse 66  60  64  68    Temperature 98.1  F (36.7  C)   97.7  F (36.5  C)     Respirations 12  16  18  21    Weight (LB) 166 lb  151 lb  158 lb 1.6 oz     Height  5' 8\"      BMI (Calculated)  22.96      Pain Score       O2 96 %   97 %  100 %       2/5/2024  7:35 AM 2/5/2024  8:00 AM 2/5/2024  4:30 PM 2/5/2024  4:40 PM   Vital Signs       Systolic 151 !  127  147 !  151 !    Diastolic 72 !  77  84 !  73 !    Pulse 52  54  54  60    Temperature   97.8  F (36.6  C)     Respirations   18     Weight (LB)   156 lb     Height   5' 7.835\"     BMI (Calculated)   23.83     Pain Score       O2 98 %  98 %  98 %            The 10 point review of system is negative except as stated in the HPI.    Allergies were reviewed and updated.     Latest Reference Range & Units 02/05/24 05:34   Sodium 135 - 145 mmol/L 139   Potassium 3.4 - 5.3 mmol/L 3.9   Chloride 98 - 107 mmol/L 105   Carbon Dioxide (CO2) 22 - 29 mmol/L 24   Urea Nitrogen 8.0 - 23.0 mg/dL 19.8   Creatinine 0.67 - 1.17 mg/dL 1.07   GFR Estimate >60 mL/min/1.73m2 72   Calcium 8.8 - 10.2 mg/dL 8.5 (L)   Anion Gap 7 - 15 mmol/L 10   Glucose 70 - 99 mg/dL 100 (H)   WBC 4.0 - 11.0 10e3/uL 5.1   Hemoglobin 13.3 - 17.7 g/dL 14.9   Hematocrit 40.0 - 53.0 % 43.9   Platelet Count 150 - 450 10e3/uL 95 (L)   RBC Count 4.40 - 5.90 10e6/uL 4.61   MCV 78 - 100 fL 95   MCH 26.5 - 33.0 pg 32.3   MCHC 31.5 - 36.5 g/dL 33.9   RDW 10.0 - 15.0 % 13.3   % Neutrophils % 66   % Lymphocytes % 18   % Monocytes % 11   % Eosinophils % 4   % Basophils % 1   Absolute Basophils 0.0 - 0.2 10e3/uL 0.1   Absolute Eosinophils 0.0 - 0.7 10e3/uL 0.2   Absolute Immature Granulocytes <=0.4 10e3/uL 0.0   Absolute Lymphocytes 0.8 - 5.3 10e3/uL 0.9   Absolute Monocytes 0.0 - 1.3 10e3/uL 0.6   % Immature Granulocytes % 0   Absolute Neutrophils 1.6 - 8.3 10e3/uL 3.3   Absolute NRBCs 10e3/uL 0.0   NRBCs per 100 WBC <1 /100 0   INR 0.85 - 1.15  1.60 (H)   (L): Data is abnormally low  (H): Data is " "abnormally high      Objective:   BP (!) 151/73 (BP Location: Right arm, Patient Position: Sitting, Cuff Size: Adult Regular)   Pulse 60   Temp 97.8  F (36.6  C) (Temporal)   Resp 18   Ht 1.723 m (5' 7.84\")   Wt 70.8 kg (156 lb)   SpO2 98%   BMI 23.84 kg/m    General: Active, alert, nontoxic-appearing.  No acute distress.  HEENT: Normocephalic, atraumatic.  Pupils are equal and round.  Sclera is clear.  Normal external ears.  Normal TMs and ear canals.  Nares patent.  Moist mucous membranes.    Cardiac: RRR.  S1, S2 present.  3/6 systolic murmur is noted best in the right upper sternal border.  Otherwise no rubs, or gallops.  Respiratory/chest: Clear to auscultation bilaterally.  No wheezes, rales, rhonchi.  Breathing is not labored.  No accessory muscle usage.  Extremities: Right groin/lower extremity: Patient has a hematoma about the size of a golf ball noted at the site of the procedure.  Mildly tender to palpation.  This area is firm, not mobile.  There is ecchymoses affecting the skin in the groin as well as the upper thigh region.  This area is nontender without any underlying mass/firmness.  Left groin/lower extremity: Small amount of bruising about the size of a golf ball noted.  No underlying hematoma or masses present.  Nontender.  Pedal and posterior tibial pulses are +2/+4 bilaterally.  Normal skin tone.  Warm extremities.  Voluntary movements intact.  Right upper extremity: Well-healed surgical site.  No hematoma/bruising noted.  Scab is noted.  Integumentary: No concerning rash or skin changes appreciated.    Amount of time spent in chart review, direct patient contact, care coordination, and related activities to patient care on the day of appointment: 70 minutes.       Stew Saravia MD  [facility] [place] [address]  Phone: [phone]  Fax: [phone]    2/5/2024  6:22 PM            Current Outpatient Medications   Medication    amoxicillin (AMOXIL) 500 MG " capsule    Blood Pressure Monitoring (B-D ASSURE BPM/AUTO ARM CUFF) MISC    Cholecalciferol (VITAMIN D PO)    CRANBERRY EXTRACT PO    ezetimibe (ZETIA) 10 MG tablet    Multiple Vitamins-Minerals (MULTIVITAL PO)    ORDER FOR DME    saw palmetto 450 MG CAPS capsule    vitamin B complex with vitamin C (VITAMIN  B COMPLEX) tablet    vitamin B-12 (CYANOCOBALAMIN) 1000 MCG tablet    vitamin C (ASCORBIC ACID) 1000 MG TABS    warfarin ANTICOAGULANT (COUMADIN) 2 MG tablet    aspirin EC 81 MG tablet     No current facility-administered medications for this visit.       Allergies   Allergen Reactions    Aspirin      Early 1990s, took one dose of aspirin + ibuprofen and had severe GI bleed - hospitalization required    Ibuprofen      Early 1990s, took one dose of aspirin + ibuprofen and had severe GI bleed -hospitalization required       Patient Active Problem List    Diagnosis Date Noted    Non-melanoma skin cancer 02/05/2024     Priority: Medium    History of GI bleed 02/05/2024     Priority: Medium     In 1990's after taking aspirin/ibuprofen.      Thrombocytopenia (H24) 02/05/2024     Priority: Medium    Hospital discharge follow-up 02/05/2024     Priority: Medium    Atrial fibrillation, unspecified type (H) 02/05/2024     Priority: Medium    History of transcatheter aortic valve implantation (LASHAE) 02/05/2024     Priority: Medium    Warfarin anticoagulation 02/05/2024     Priority: Medium     Stop warfarin on 05/01/2024. Start ASA w/lifelong use there after. See 02/05/2024 note.       Chronic diastolic (congestive) heart failure (H) 02/01/2024     Priority: Medium    Stage 2 chronic kidney disease 02/01/2024     Priority: Medium    Bicuspid aortic valve 05/16/2023     Priority: Medium    History of coronary artery bypass surgery 12/05/2022     Priority: Medium     Last Assessment & Plan:    Formatting of this note might be different from the original.   Fortunately, he is not had symptoms of exertional angina. The patient  is aware of the need for Secondary prevention through aggressive CV risk factor modifications to include: blood pressure control, LDL control and daily exercise (per guidelines), etc.      Tobacco abuse, in remission 12/05/2022     Priority: Medium     Last Assessment & Plan:    Formatting of this note might be different from the original.   Fortunately, he is not smoking at this time.      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   Tylenol if needed.      History of atrial fibrillation 07/11/2019     Priority: Medium     Formatting of this note might be different from the original.   3/12:  s/p MAZE during CABG     7/12:  s/p cardioversion  Formatting of this note might be different from the original.   3/12:  s/p MAZE during CABG     7/12:  s/p cardioversion      Mild aortic stenosis 07/11/2019     Priority: Medium     Formatting of this note might be different from the original.   Long-standing mild AS  5/17:  Echo:  mild AS  Formatting of this note might be different from the original.   Long-standing mild AS  5/17:  Echo:  mild AS      Last Assessment & Plan:    Formatting of this note might be different from the original.   He was able to see Dr. Anna for his aortic stenosis in the recommendation was for close follow-up to observe progression of disease.  Repeat echo just recently showed a mean gradient had gone from 20/9 up to 36 mmHg, with a valve area constant of 0.8 cm2.  He  continues to be quite vigorous without any cardiac symptoms.  He is planning on follow-up visit with Dr. Anna in the near future.  Formatting of this note might be different from the original.   Formatting of this note might be different from the original.    Long-standing mild AS  5/17:  Echo:  mild AS   Formatting of this note might be different from the original.    Long-standing mild AS  5/17:  Echo:  mild AS       Last Assessment & Plan:     Formatting of this note might be different from the original.    He was able to see Dr. Anna for his aortic stenosis in the recommendation was for close follow-up to observe progression of disease.  Repeat echo just recently showed a mean gradient had gone from 20/9 up to 36 mmHg, with a valve area constant of 0.8 cm2.  He continues to be quite vigorous without any cardiac symptoms.  He is planning on follow-up visit with Dr. Anna in the near future.      Rosacea 07/11/2019     Priority: Medium    Tinnitus 07/11/2019     Priority: Medium     Formatting of this note might be different from the original.   2/18:  Audiogram:  Bilateral moderate high frequency sensorineural hearing loss from 1751-2426 Hz with normal hearing 250-2000 Hz. Speech discrimination ability in quiet is excellent at normal conversational levels. Not a candidate for amplification  Formatting of this note might be different from the original.   2/18:  Audiogram:  Bilateral moderate high frequency sensorineural hearing loss from 6641-3028 Hz with normal hearing 250-2000 Hz. Speech discrimination ability in quiet is excellent at normal conversational levels. Not a candidate for amplification  Formatting of this note might be different from the original.   2/18:  Audiogram:  Bilateral moderate high frequency sensorineural hearing loss from 6300-9012 Hz with normal hearing 250-2000 Hz. Speech discrimination ability in quiet is excellent at normal conversational levels. Not a candidate for  amplification  Formatting of this note might be different from the original.   Formatting of this note might be different from the original.    2/18:  Audiogram:  Bilateral moderate high frequency sensorineural hearing loss from 2384-7283 Hz with normal hearing 250-2000 Hz. Speech discrimination ability in quiet is excellent at normal conversational levels. Not a candidate for amplification   Formatting of this note might be different from the original.    2/18:  Audiogram:  Bilateral moderate high frequency sensorineural hearing loss from 3895-8445 Hz with normal hearing 250-2000 Hz. Speech discrimination ability in quiet is excellent at normal conversational levels. Not a candidate for amplification   Formatting of this note might be different from the original.    2/18:  Audiogram:  Bilateral moderate high frequency sensorineural hearing loss from 3276-6697 Hz with normal hearing 250-2000 Hz. Speech discrimination ability in quiet is excellent at normal conversational levels. Not a candidate for amplification      Recurrent UTI 08/01/2018     Priority: Medium     Formatting of this note might be different from the original.   11/17:     12/17:  Triphasic CT Kidneys:  No stones, microlobulated contour of post-inf bladder wall, likely trabeculations; correlate w/ cysto   1/18:  Cysto:  normal  Formatting of this note might be different from the original.   11/17:     12/17:  Triphasic CT Kidneys:  No stones, microlobulated contour of post-inf bladder wall, likely trabeculations; correlate w/ cysto   1/18:  Cysto:  normal      Primary central sleep apnea 02/07/2017     Priority: Medium     Formatting of this note might be different from the original.   5/31/17:  Sleep study titration:  ASV auto w/ EPAP min 5  max 15; PS min 0  max 15; resp events incl central apneas were controlled at these settings  Formatting of this note might be different from the original.   5/31/17:  Sleep study titration:  ASV  auto w/ EPAP min 5  max 15; PS min 0  max 15; resp events incl central apneas were controlled at these settings  Formatting of this note might be different from the original.   Formatting of this note might be different from the original.    5/31/17:  Sleep study titration:  ASV auto w/ EPAP min 5  max 15; PS min 0  max 15; resp events incl central apneas were controlled at these settings   Formatting of this note might be different from the original.    5/31/17:  Sleep study titration:  ASV auto w/ EPAP min 5  max 15; PS min 0  max 15; resp events incl central apneas were controlled at these settings      Advanced directives, counseling/discussion 03/19/2013     Priority: Medium     Advance Care Planning:   ACP Review and Resources Provided:  Reviewed chart for advance care plan.  Wiley Storey has an up to date advance care plan on file. See additional documentation in Problem List and click on code status for document details. Confirmed/documented designated decision maker(s). See permanent comments section of demographics in clinical tab.   Added by Viki Olmedo on 3/19/2013            NSVT (nonsustained ventricular tachycardia) (H)      Priority: Medium    Hyperlipidemia LDL goal <70 03/07/2012     Priority: Medium    Intermediate coronary syndrome (H) 03/07/2012     Priority: Medium    Abnormal stress electrocardiogram test 03/06/2012     Priority: Medium    ASCVD (arteriosclerotic cardiovascular disease) 03/06/2012     Priority: Medium    Chest discomfort 02/09/2012     Priority: Medium    Hyperthyroidism 02/08/2012     Priority: Medium     Was taking supplements from herbalist with iodine in them -   Treated with methimazole  Avoid IV contrast - iodine or supplements      Hypertension goal BP (blood pressure) < 140/90 02/07/2012     Priority: Medium     On lisinopril -   Started in 2003        Sleep apnea 02/07/2012     Priority: Medium     Sleeps with CPAP -        Atrial fibrillation (H) 02/07/2012      Priority: Medium     New onset 2012 -   Getting ECHO to look at aortic vavle (hx of sclerosis)  On coumadin (hx of ASCVD s/p bypass) from 2012 - 9/2015 (stopped by cardiology)    S/p maze procedure      Aortic sclerosis 10/11/2011     Priority: Medium    Esophageal reflux 10/11/2011     Priority: Medium    Headache 10/11/2011     Priority: Medium     Problem list name updated by automated process. Provider to review      Thyroid nodule 10/11/2011     Priority: Medium     Needs repeat thyroid ultrasound in 2014      Lumbago 11/05/2004     Priority: Medium    Peptic ulcer without hemorrhage or perforation 11/05/2004     Priority: Medium     Formatting of this note might be different from the original.   nSAID-INDUCED         Family History   Problem Relation Age of Onset    Cardiovascular Unknown         aortic aneurysm, CAD    Cancer Mother 86        pancreatic    C.A.D. Mother         passed away at 86    C.A.D. Brother         stent    Diabetes Maternal Grandmother     Hypertension Brother        Past Surgical History:   Procedure Laterality Date    ANESTHESIA CARDIOVERSION  7/2/2012    Procedure: ANESTHESIA CARDIOVERSION;  Anesthesia Off site Cardioversion;  Surgeon: Provider, Generic Anesthesia;  Location: [facility] OR    BYPASS GRAFT ARTERY CORONARY  3/8/2012    Procedure:BYPASS GRAFT ARTERY CORONARY; Median Sternotomy, Coronary Artery Bypass Graft X4 Using the Left Internal Mamary and Left Saphenous Vein with MAZE Procedure, and On Pump Oxygenator.; Surgeon:MICHOACANO HOANG; Location:[facility] OR    COLONOSCOPY N/A 8/16/2016    Procedure: COLONOSCOPY;  Surgeon: Brad Peralta MD;  Location:  GI    COLONOSCOPY N/A 11/10/2016    Procedure: COLONOSCOPY;  Surgeon: Brad Peralta MD;  Location:  GI    MAZE PROCEDURE  3/8/2012    Procedure:MAZE PROCEDURE; Surgeon:MICHOACANO HOANG; Location:[facility] OR    ulcer operation  1991        Social History     Socioeconomic History    Marital status:      Spouse name: Not on  file    Number of children: Not on file    Years of education: Not on file    Highest education level: Not on file   Occupational History    Not on file   Tobacco Use    Smoking status: Former     Types: Cigarettes     Quit date: 1983     Years since quittin.1     Passive exposure: Never    Smokeless tobacco: Never   Vaping Use    Vaping Use: Never used   Substance and Sexual Activity    Alcohol use: Yes     Comment: occ wine    Drug use: No    Sexual activity: Yes     Partners: Female   Other Topics Concern    Parent/sibling w/ CABG, MI or angioplasty before 65F 55M? No   Social History Narrative    Not on file     Social Determinants of Health     Financial Resource Strain: Low Risk  (2024)    Financial Resource Strain     Within the past 12 months, have you or your family members you live with been unable to get utilities (heat, electricity) when it was really needed?: No   Food Insecurity: Low Risk  (2024)    Food Insecurity     Within the past 12 months, did you worry that your food would run out before you got money to buy more?: No     Within the past 12 months, did the food you bought just not last and you didn t have money to get more?: No   Transportation Needs: Low Risk  (2024)    Transportation Needs     Within the past 12 months, has lack of transportation kept you from medical appointments, getting your medicines, non-medical meetings or appointments, work, or from getting things that you need?: No   Physical Activity: Not on file   Stress: Not on file   Social Connections: Not on file   Interpersonal Safety: Not on file   Housing Stability: Low Risk  (2024)    Housing Stability     Do you have housing? : Yes     Are you worried about losing your housing?: No

## 2024-02-05 NOTE — DISCHARGE INSTRUCTIONS
Your INR today is 1.6, which is below the range that was recommended from your operative notes.  They recommended 2.0 - 3.0.  Please call your primary care clinic office today to discuss a treatment plan.    Call your cardiovascular surgery office at [facility] today to discuss your symptoms, and if there is not a need for follow-up earlier than your regularly scheduled appointment.    Please return to the emergency department if your symptoms worsen, if you notice the area of swelling enlarging, if you develop numbness, weakness, or a cold right leg when compared to your left leg.

## 2024-02-05 NOTE — ED NOTES
EMERGENCY DEPARTMENT SIGN OUT NOTE        ED COURSE AND MEDICAL DECISION MAKING  Patient was signed out to me by Dr Louis Cartagena at 7:04 AM.     In brief, Wiley Storey is a 73 year old male who initially presented with left groin bulge noticed early this morning, had a TAVR at [place] 4 days ago.  Concern for pseudoaneurysm and awaiting US.       At time of sign out, disposition was pending US.    7:55 AM US neg for pseudoaneurysm or other acute complication.  Pt updated on results, will continue follow up with his surgical team as scheduled with the [facility].  Pt reassured, agreeable to plan.    FINAL IMPRESSION    1. Groin swelling    2. Subtherapeutic international normalized ratio (INR)        ED MEDS  Medications - No data to display    LAB  Labs Ordered and Resulted from Time of ED Arrival to Time of ED Departure   BASIC METABOLIC PANEL - Abnormal       Result Value    Sodium 139      Potassium 3.9      Chloride 105      Carbon Dioxide (CO2) 24      Anion Gap 10      Urea Nitrogen 19.8      Creatinine 1.07      GFR Estimate 72      Calcium 8.5 (*)     Glucose 100 (*)    INR - Abnormal    INR 1.60 (*)    CBC WITH PLATELETS AND DIFFERENTIAL - Abnormal    WBC Count 5.1      RBC Count 4.61      Hemoglobin 14.9      Hematocrit 43.9      MCV 95      MCH 32.3      MCHC 33.9      RDW 13.3      Platelet Count 95 (*)     % Neutrophils 66      % Lymphocytes 18      % Monocytes 11      % Eosinophils 4      % Basophils 1      % Immature Granulocytes 0      NRBCs per 100 WBC 0      Absolute Neutrophils 3.3      Absolute Lymphocytes 0.9      Absolute Monocytes 0.6      Absolute Eosinophils 0.2      Absolute Basophils 0.1      Absolute Immature Granulocytes 0.0      Absolute NRBCs 0.0           RADIOLOGY    Us Post Vascular Access Low Ext Duplex   Preliminary Result   IMPRESSION:    No evidence of pseudoaneurysm or other access site complication in the right groin.          DISCHARGE MEDS  New Prescriptions    No medications  on file     Manuel Knutson, [facility] EMERGENCY DEPARTMENT  [facility] [address]  [phone]       Manuel Knutson MD  02/05/24 0759

## 2024-02-05 NOTE — ED PROVIDER NOTES
EMERGENCY DEPARTMENT ENCOUNTER      NAME: Wiley Storey  AGE: 73 year old male  YOB: 1950  MRN: [id number]  EVALUATION DATE & TIME: 2/5/2024  5:27 AM    PCP: Betsey Ross    ED PROVIDER: Louis Cartagena MD      Chief Complaint   Patient presents with    hematoma on right groin         FINAL IMPRESSION:  1. Groin swelling    2. Subtherapeutic international normalized ratio (INR)          ED COURSE & MEDICAL DECISION MAKING:    Pertinent Labs & Imaging studies reviewed. (See chart for details)  73 year old male presents to the Emergency Department for evaluation of R groin hematoma    ED Course as of 02/05/24 0734   Mon Feb 05, 2024   0548 Patient is a 73-year-old male who had a LASHAE on 2/1 with right femoral artery access, left femoral vein access, and right radial artery access.  1 hour prior to arrival the patient noticed new onset swelling in his right groin.  No numbness or weakness in his lower extremity.  The palpable hematoma on exam is pulsatile, not expanding.  Distal DP and PT pulses normal.  Distal sensation intact.  Distal strength and range of motion intact.  There is ecchymosis overlying that appears subacute.  Plan for arterial Doppler ultrasound to further evaluate for differential of simple hematoma, pseudoaneurysm, dissection, or active bleed. [facility], Dr. Quinton Millard MD performed procedure.   0614 EKG is normal sinus rhythm at a rate of 71.  No ST segment changes indicative of emergent ischemia.   0614 No electrolyte abnormalities or kidney injury.  No leukocytosis or anemia.   0614 INR is subtherapeutic at 1.6 (goal 2-3). He takes 3mg daily.   0615 Pending ultrasound.       Medical Decision Making    History:  Supplemental history from: Documented in chart  External Record(s) reviewed: Documented in chart    Work Up:  Chart documentation includes differential considered and any EKGs or imaging independently interpreted by provider, where  specified.  In additional to work up documented, I considered the following work up: Documented in chart, if applicable.    External consultation:  Discussion of management with another provider: Documented in chart, if applicable    Complicating factors:  Care impacted by chronic illness: Anticoagulated State  Care affected by social determinants of health: N/A    Disposition considerations: Discharge. No recommendations on prescription strength medication(s). See documentation for any additional details.        At the conclusion of the encounter I discussed the results of all of the tests and the disposition. The questions were answered. The patient or family acknowledged understanding and was agreeable with the care plan.     0 minutes of critical care time     MEDICATIONS GIVEN IN THE EMERGENCY:  Medications - No data to display    NEW PRESCRIPTIONS STARTED AT TODAY'S ER VISIT  New Prescriptions    No medications on file          =================================================================    HPI    Patient information was obtained from: patient    Use of : N/A        Wiley Storey is a 73 year old male with a pertinent history of atrial fibrillation, coronary artery disease status post CABG 3/2012, severe aortic stenosis that was symptomatic status post LASHAE on 2/1/2024 who presents to this ED for evaluation of right groin hematoma.  The patient is on warfarin since the procedure.  The procedure note reviewed and documented access in the right femoral artery, left femoral vein, right radial artery.  The patient states that while he was in the hospital he did have a hematoma on that side, but was dealt with.  He has been taking his warfarin daily as prescribed, and was expected to get his INR checked today.  When he rolled over in bed this morning he noted a bulge in his right groin that was not present previously.  He is not sure if it has been enlarging in the past hour.  He denies numbness  or weakness in his right lower extremity.  He denies abdominal pain or genital pain.  He denies black or bloody stools.  Denies hematuria.  Denies chest pain or difficulty breathing.  Denies lightheadedness.      PAST MEDICAL HISTORY:  Past Medical History:   Diagnosis Date    Atrial fibrillation (H)     Maze 3/2012    CAD (coronary artery disease)     s/p CABG 3/2012-  LIMA to LAD and RSVGs to OM, D, and RPDA    GERD (gastroesophageal reflux disease)     GI bleed 1991    History of blood transfusion     Hyperlipidemia     Hypertension     NSVT (nonsustained ventricular tachycardia) (H)     Sleep apnea     uses CPAP    Sleep apnea     cpap    Thyroid disease     hyperthyroid       PAST SURGICAL HISTORY:  Past Surgical History:   Procedure Laterality Date    ANESTHESIA CARDIOVERSION  7/2/2012    Procedure: ANESTHESIA CARDIOVERSION;  Anesthesia Off site Cardioversion;  Surgeon: Provider, Generic Anesthesia;  Location: [facility] OR    BYPASS GRAFT ARTERY CORONARY  3/8/2012    Procedure:BYPASS GRAFT ARTERY CORONARY; Median Sternotomy, Coronary Artery Bypass Graft X4 Using the Left Internal Mamary and Left Saphenous Vein with MAZE Procedure, and On Pump Oxygenator.; Surgeon:MICHOACANO HOANG; Location[facility] OR    COLONOSCOPY N/A 8/16/2016    Procedure: COLONOSCOPY;  Surgeon: Brad Peralta MD;  Location:  GI    COLONOSCOPY N/A 11/10/2016    Procedure: COLONOSCOPY;  Surgeon: Brad Peralta MD;  Location:  GI    MAZE PROCEDURE  3/8/2012    Procedure:MAZE PROCEDURE; Surgeon:MICHOACANO HOANG; Location:[facility] OR    ulcer operation  1991           CURRENT MEDICATIONS:    aspirin EC 81 MG tablet  Blood Pressure Monitoring (B-D ASSURE BPM/AUTO ARM CUFF) MISC  Cholecalciferol (VITAMIN D PO)  CRANBERRY EXTRACT PO  Multiple Vitamins-Minerals (MULTIVITAL PO)  ORDER FOR DME  Phytosterol Esters (CHOLEST CARE PO)  saw palmetto 450 MG CAPS capsule  vitamin B complex with vitamin C (VITAMIN  B COMPLEX) tablet  vitamin B-12 (CYANOCOBALAMIN)  1000 MCG tablet  vitamin C (ASCORBIC ACID) 1000 MG TABS        ALLERGIES:  Allergies   Allergen Reactions    Aspirin      Early , took one dose of aspirin + ibuprofen and had severe GI bleed - hospitalization required    Ibuprofen      Early , took one dose of aspirin + ibuprofen and had severe GI bleed -hospitalization required       FAMILY HISTORY:  Family History   Problem Relation Age of Onset    Cardiovascular Unknown         aortic aneurysm, CAD    Cancer Mother 86        pancreatic    C.A.D. Mother         passed away at 86    C.A.D. Brother         stent    Diabetes Maternal Grandmother     Hypertension Brother        SOCIAL HISTORY:   Social History     Socioeconomic History    Marital status:    Tobacco Use    Smoking status: Former     Types: Cigarettes     Quit date: 1983     Years since quittin.1    Smokeless tobacco: Never   Substance and Sexual Activity    Alcohol use: Yes     Comment: occ wine    Drug use: No    Sexual activity: Yes     Partners: Female   Other Topics Concern    Parent/sibling w/ CABG, MI or angioplasty before 65F 55M? No       VITALS:  BP (!) 185/95   Pulse 68   Temp 97.7  F (36.5  C) (Oral)   Resp 21   Wt 71.7 kg (158 lb 1.6 oz)   SpO2 100%   BMI 24.04 kg/m      PHYSICAL EXAM    Physical Exam  Vitals and nursing note reviewed.   Constitutional:       General: He is not in acute distress.     Appearance: Normal appearance. He is normal weight. He is not ill-appearing.   HENT:      Head: Normocephalic and atraumatic.      Nose: Nose normal.   Eyes:      Extraocular Movements: Extraocular movements intact.      Conjunctiva/sclera: Conjunctivae normal.   Cardiovascular:      Rate and Rhythm: Normal rate and regular rhythm.      Pulses: Normal pulses.   Pulmonary:      Effort: Pulmonary effort is normal. No respiratory distress.   Abdominal:      General: Abdomen is flat. There is no distension.      Palpations: Abdomen is soft.      Tenderness: There is  no abdominal tenderness.   Musculoskeletal:         General: Normal range of motion.      Cervical back: Normal range of motion.      Right lower leg: No edema.      Left lower leg: No edema.      Comments: Right groin has a pulsatile firm hematoma.  Not expanding during the time of examination.  Overlying subacute appearing ecchymosis.  Distal sensation, motor function, range of motion is normal and symmetric with the left side.  DP and PT pulses 2+ and symmetric with the left side.    Skin:     General: Skin is warm and dry.      Capillary Refill: Capillary refill takes less than 2 seconds.      Coloration: Skin is not pale.   Neurological:      General: No focal deficit present.      Mental Status: He is alert and oriented to person, place, and time. Mental status is at baseline.   Psychiatric:         Mood and Affect: Mood normal.         Behavior: Behavior normal.         Thought Content: Thought content normal.         Judgment: Judgment normal.            LAB:  All pertinent labs reviewed and interpreted.  Results for orders placed or performed during the hospital encounter of 02/05/24   Basic metabolic panel   Result Value Ref Range    Sodium 139 135 - 145 mmol/L    Potassium 3.9 3.4 - 5.3 mmol/L    Chloride 105 98 - 107 mmol/L    Carbon Dioxide (CO2) 24 22 - 29 mmol/L    Anion Gap 10 7 - 15 mmol/L    Urea Nitrogen 19.8 8.0 - 23.0 mg/dL    Creatinine 1.07 0.67 - 1.17 mg/dL    GFR Estimate 72 >60 mL/min/1.73m2    Calcium 8.5 (L) 8.8 - 10.2 mg/dL    Glucose 100 (H) 70 - 99 mg/dL   Result Value Ref Range    INR 1.60 (H) 0.85 - 1.15   CBC with platelets and differential   Result Value Ref Range    WBC Count 5.1 4.0 - 11.0 10e3/uL    RBC Count 4.61 4.40 - 5.90 10e6/uL    Hemoglobin 14.9 13.3 - 17.7 g/dL    Hematocrit 43.9 40.0 - 53.0 %    MCV 95 78 - 100 fL    MCH 32.3 26.5 - 33.0 pg    MCHC 33.9 31.5 - 36.5 g/dL    RDW 13.3 10.0 - 15.0 %    Platelet Count 95 (L) 150 - 450 10e3/uL    % Neutrophils 66 %    %  Lymphocytes 18 %    % Monocytes 11 %    % Eosinophils 4 %    % Basophils 1 %    % Immature Granulocytes 0 %    NRBCs per 100 WBC 0 <1 /100    Absolute Neutrophils 3.3 1.6 - 8.3 10e3/uL    Absolute Lymphocytes 0.9 0.8 - 5.3 10e3/uL    Absolute Monocytes 0.6 0.0 - 1.3 10e3/uL    Absolute Eosinophils 0.2 0.0 - 0.7 10e3/uL    Absolute Basophils 0.1 0.0 - 0.2 10e3/uL    Absolute Immature Granulocytes 0.0 <=0.4 10e3/uL    Absolute NRBCs 0.0 10e3/uL       RADIOLOGY:  Reviewed all pertinent imaging. Please see official radiology report.  Us Post Vascular Access Low Ext Duplex    (Results Pending)       PROCEDURES:   None      [facility] Documentation:   CMS Diagnoses:               Louis Cartagena MD  [facility] EMERGENCY DEPARTMENT  [address] [address]  [phone]       Louis Cartagena MD  02/05/24 0734

## 2024-02-06 ENCOUNTER — TELEPHONE (OUTPATIENT)
Dept: ANTICOAGULATION | Facility: CLINIC | Age: 74
End: 2024-02-06
Payer: COMMERCIAL

## 2024-02-06 DIAGNOSIS — I10 HYPERTENSION GOAL BP (BLOOD PRESSURE) < 140/90: ICD-10-CM

## 2024-02-06 DIAGNOSIS — D69.6 THROMBOCYTOPENIA (H): ICD-10-CM

## 2024-02-06 DIAGNOSIS — Z95.1 HISTORY OF CORONARY ARTERY BYPASS SURGERY: ICD-10-CM

## 2024-02-06 DIAGNOSIS — Q23.81 BICUSPID AORTIC VALVE: Primary | ICD-10-CM

## 2024-02-06 DIAGNOSIS — E78.5 HYPERLIPIDEMIA LDL GOAL <70: ICD-10-CM

## 2024-02-06 DIAGNOSIS — Z87.19 HISTORY OF GI BLEED: ICD-10-CM

## 2024-02-06 DIAGNOSIS — Z09 HOSPITAL DISCHARGE FOLLOW-UP: ICD-10-CM

## 2024-02-06 DIAGNOSIS — N40.0 BENIGN PROSTATIC HYPERPLASIA WITHOUT URINARY OBSTRUCTION: ICD-10-CM

## 2024-02-06 DIAGNOSIS — I48.91 ATRIAL FIBRILLATION, UNSPECIFIED TYPE (H): ICD-10-CM

## 2024-02-06 DIAGNOSIS — Z95.2 HISTORY OF TRANSCATHETER AORTIC VALVE IMPLANTATION (TAVI): ICD-10-CM

## 2024-02-06 NOTE — TELEPHONE ENCOUNTER
"ANTICOAGULATION  MANAGEMENT: NEW REFERRAL      SUBJECTIVE/OBJECTIVE     Wiley Storey, a 73 year old male  is newly referred to [facility].    Anticoagulation:    Previously on warfarin: No, new to anticoagulation  Warfarin initiation date (approximate): 24   Indication(s):  LASHAE on 24   Goal Range: 2-3 for 3 months   Anticoagulation Bridge/Overlap: No   Referring provider: [facility] cardi[facility]     General Dietary/Social Hx:    Typical vitamin K intake: high; consistent  (plans to have salads 3x weekly then veggie the 4 other days)    Other dietary considerations: None     Social History:   Social History     Tobacco Use    Smoking status: Former     Types: Cigarettes     Quit date: 1983     Years since quittin.1     Passive exposure: Never    Smokeless tobacco: Never   Vaping Use    Vaping Use: Never used   Substance Use Topics    Alcohol use: Yes     Comment: occ wine    Drug use: No       In the past 2 weeks, patient estimates taking medications as instructed % of time: 100    Results:        Recent labs: (last 7 days)     24  0534   INR 1.60*       Wt Readings from Last 2 Encounters:   24 70.8 kg (156 lb)   24 71.7 kg (158 lb 1.6 oz)      Estimated body mass index is 23.84 kg/m  as calculated from the following:    Height as of 24: 1.723 m (5' 7.84\").    Weight as of 24: 70.8 kg (156 lb).  Lab Results   Component Value Date    AST 21 01/15/2016    ALT 26 01/15/2016    ALBUMIN 4.4 01/15/2016     Lab Results   Component Value Date    CR 1.07 2024     Estimated Creatinine Clearance: 59.7 mL/min (based on SCr of 1.07 mg/dL).    ASSESSMENT     Goal INR 2-3, standard for indication(s) above  Establishing initial warfarin maintenance dose (on warfarin < 30 days)   Started on Warfarin 3mg on [facility] LASHAE.  Patient has Warfarin 2mg tabs.  He will hold Aspirin while taking Warfarin for 3 months, through " 5/1/24.  ER visit on 2/5 for right groin hematoma.  History of GI bleed.      PLAN     Dosing Instructions: Continue your current warfarin dose with INR in 2 days       Summary  As of 2/6/2024      Full warfarin instructions:  3 mg every day   Next INR check:  2/8/2024               Education provided:   Taking warfarin: purpose of warfarin and how it works, take warfarin at same time each day; preferably in the evening, prescribed tablet strength and color, importance of following [facility] instructions vs instructions on the prescription bottle, and Importance of taking warfarin as instructed  Goal range and lab monitoring: goal range and significance of current result, Importance of therapeutic range, Importance of following up at instructed interval, and frequency of lab work when starting warfarin and importance of following up when instructed (extends after stability established)  Dietary considerations: importance of consistent vitamin K intake, impact of vitamin K foods on INR, vitamin K content of foods, and importance of notifying [facility] to changes in diet  Importance of notifying anticoagulation clinic for: if you did not receive dosing instructions on the same day as your labs were checked  Contact [phone] with any changes, questions or concerns.     Education still needed:   None required  Patient didn't want Warfarin education mailed to him since he already had some education at home from [place]      Telephone call with Wiley who verbalizes understanding and agrees to plan    Lab visit scheduled 2/8/24    Standing orders placed in Epic: Point of Care INR (Lab 5000)    Plan made per [facility] anticoagulation protocol    Linda Land, RN  Anticoagulation Clinic  2/6/2024

## 2024-02-07 ENCOUNTER — TRANSCRIBE ORDERS (OUTPATIENT)
Dept: OTHER | Age: 74
End: 2024-02-07

## 2024-02-07 DIAGNOSIS — Z95.2 AORTIC VALVE PROSTHESIS PRESENT: Primary | ICD-10-CM

## 2024-02-08 ENCOUNTER — LAB (OUTPATIENT)
Dept: LAB | Facility: CLINIC | Age: 74
End: 2024-02-08
Payer: COMMERCIAL

## 2024-02-08 ENCOUNTER — ANTICOAGULATION THERAPY VISIT (OUTPATIENT)
Dept: ANTICOAGULATION | Facility: CLINIC | Age: 74
End: 2024-02-08

## 2024-02-08 DIAGNOSIS — N40.0 BENIGN PROSTATIC HYPERPLASIA WITHOUT URINARY OBSTRUCTION: ICD-10-CM

## 2024-02-08 DIAGNOSIS — E78.5 HYPERLIPIDEMIA LDL GOAL <70: ICD-10-CM

## 2024-02-08 DIAGNOSIS — I48.91 ATRIAL FIBRILLATION, UNSPECIFIED TYPE (H): ICD-10-CM

## 2024-02-08 DIAGNOSIS — D69.6 THROMBOCYTOPENIA (H): ICD-10-CM

## 2024-02-08 DIAGNOSIS — Z95.1 HISTORY OF CORONARY ARTERY BYPASS SURGERY: ICD-10-CM

## 2024-02-08 DIAGNOSIS — Q23.81 BICUSPID AORTIC VALVE: Primary | ICD-10-CM

## 2024-02-08 DIAGNOSIS — I10 HYPERTENSION GOAL BP (BLOOD PRESSURE) < 140/90: ICD-10-CM

## 2024-02-08 DIAGNOSIS — Z09 HOSPITAL DISCHARGE FOLLOW-UP: ICD-10-CM

## 2024-02-08 DIAGNOSIS — Z87.19 HISTORY OF GI BLEED: ICD-10-CM

## 2024-02-08 DIAGNOSIS — Z95.2 HISTORY OF TRANSCATHETER AORTIC VALVE IMPLANTATION (TAVI): ICD-10-CM

## 2024-02-08 LAB
ATRIAL RATE - MUSE: 71 BPM
DIASTOLIC BLOOD PRESSURE - MUSE: NORMAL MMHG
INR BLD: 1.7 (ref 0.9–1.1)
INTERPRETATION ECG - MUSE: NORMAL
P AXIS - MUSE: 63 DEGREES
PR INTERVAL - MUSE: 196 MS
QRS DURATION - MUSE: 98 MS
QT - MUSE: 422 MS
QTC - MUSE: 458 MS
R AXIS - MUSE: 56 DEGREES
SYSTOLIC BLOOD PRESSURE - MUSE: NORMAL MMHG
T AXIS - MUSE: 32 DEGREES
VENTRICULAR RATE- MUSE: 71 BPM

## 2024-02-08 PROCEDURE — 85610 PROTHROMBIN TIME: CPT

## 2024-02-08 PROCEDURE — 36416 COLLJ CAPILLARY BLOOD SPEC: CPT

## 2024-02-08 NOTE — PROGRESS NOTES
ANTICOAGULATION MANAGEMENT     Wiley Storey 73 year old male is on warfarin with subtherapeutic INR result. (Goal INR 2.0-3.0)    Recent labs: (last 7 days)     24  1231   INR 1.7*       ASSESSMENT     Source(s): Chart Review and Patient/Caregiver Call     Warfarin doses taken: Warfarin taken as instructed, started on 24   Diet: No new diet changes identified  Medication/supplement changes: None noted  New illness, injury, or hospitalization: 24 LASHAE  Signs or symptoms of bleeding or clottin/5 ER visit for right groin hematoma  Previous result: Subtherapeutic  Additional findings: Recent heart valve replacement in last 10 days, warfarin for 3 months, through 24       PLAN     Recommended plan for no diet, medication or health factor changes affecting INR     Dosing Instructions: booster dose then Increase your warfarin dose (14.3% change) with next INR in 4 days       Summary  As of 2024      Full warfarin instructions:  : 5 mg; Otherwise 4 mg every e, Thu, Sat; 3 mg all other days   Next INR check:  2024               Telephone call with Wiley who verbalizes understanding and agrees to plan.  Tengion message also sent.      Lab visit scheduled    Education provided:   Goal range and lab monitoring: goal range and significance of current result  Contact [phone] with any changes, questions or concerns.     Plan made with [facility] Pharmacist Dot Land, RN  Anticoagulation Clinic  2024    _______________________________________________________________________     Anticoagulation Episode Summary       Current INR goal:  2.0-3.0   TTR:  --   Target end date:  2024   Send INR reminders to:  MIGUELINA SIMPSON    Indications    Bicuspid aortic valve [Q23.1]  Thrombocytopenia (H24) [D69.6]  Hyperlipidemia LDL goal <70 [E78.5]  Hypertension goal BP (blood pressure) < 140/90 [I10]  History of coronary artery bypass surgery [Z95.1]  Hospital discharge  follow-up [Z09]  History of GI bleed [Z87.19]  Atrial fibrillation  unspecified type (H) [I48.91]  Benign prostatic hyperplasia without urinary obstruction [N40.0]  History of transcatheter aortic valve implantation (LASHAE) [Z95.2]             Comments:               Anticoagulation Care Providers       Provider Role Specialty Phone number    Stew Saravia MD Referring Family Medicine [phone]

## 2024-02-09 ENCOUNTER — TRANSCRIBE ORDERS (OUTPATIENT)
Dept: OTHER | Age: 74
End: 2024-02-09

## 2024-02-12 ENCOUNTER — ANTICOAGULATION THERAPY VISIT (OUTPATIENT)
Dept: ANTICOAGULATION | Facility: CLINIC | Age: 74
End: 2024-02-12

## 2024-02-12 ENCOUNTER — LAB (OUTPATIENT)
Dept: LAB | Facility: CLINIC | Age: 74
End: 2024-02-12
Payer: COMMERCIAL

## 2024-02-12 DIAGNOSIS — Q23.81 BICUSPID AORTIC VALVE: Primary | ICD-10-CM

## 2024-02-12 DIAGNOSIS — I48.91 ATRIAL FIBRILLATION, UNSPECIFIED TYPE (H): ICD-10-CM

## 2024-02-12 DIAGNOSIS — Z95.1 HISTORY OF CORONARY ARTERY BYPASS SURGERY: ICD-10-CM

## 2024-02-12 DIAGNOSIS — Z95.2 HISTORY OF TRANSCATHETER AORTIC VALVE IMPLANTATION (TAVI): ICD-10-CM

## 2024-02-12 DIAGNOSIS — E78.5 HYPERLIPIDEMIA LDL GOAL <70: ICD-10-CM

## 2024-02-12 DIAGNOSIS — Z09 HOSPITAL DISCHARGE FOLLOW-UP: ICD-10-CM

## 2024-02-12 DIAGNOSIS — D69.6 THROMBOCYTOPENIA (H): ICD-10-CM

## 2024-02-12 DIAGNOSIS — N40.0 BENIGN PROSTATIC HYPERPLASIA WITHOUT URINARY OBSTRUCTION: ICD-10-CM

## 2024-02-12 DIAGNOSIS — I10 HYPERTENSION GOAL BP (BLOOD PRESSURE) < 140/90: ICD-10-CM

## 2024-02-12 DIAGNOSIS — Z87.19 HISTORY OF GI BLEED: ICD-10-CM

## 2024-02-12 LAB — INR BLD: 2.5 (ref 0.9–1.1)

## 2024-02-12 PROCEDURE — 36416 COLLJ CAPILLARY BLOOD SPEC: CPT

## 2024-02-12 PROCEDURE — 85610 PROTHROMBIN TIME: CPT

## 2024-02-12 NOTE — PROGRESS NOTES
ANTICOAGULATION MANAGEMENT     Wiley Storey 73 year old male is on warfarin with therapeutic INR result. (Goal INR 2.0-3.0)    Recent labs: (last 7 days)     02/12/24  0914   INR 2.5*       ASSESSMENT     Source(s): Chart Review and Patient/Caregiver Call     Warfarin doses taken: Warfarin taken as instructed  Diet: No new diet changes identified  Medication/supplement changes: None noted  New illness, injury, or hospitalization: No-says he has ongoing cold symptoms but nothing new or worsening.  Signs or symptoms of bleeding or clotting: No  Previous result: Subtherapeutic-boost and 14% increase 2/8/24  Additional findings: Recent heart valve replacement in last 10 weeks: 2/1/24       PLAN     Recommended plan for no diet, medication or health factor changes affecting INR     Dosing Instructions: Continue your current warfarin dose with next INR in 3-4 days       Summary  As of 2/12/2024      Full warfarin instructions:  4 mg every Tue, Thu, Sat; 3 mg all other days   Next INR check:  2/15/2024               Telephone call with Wiley who verbalizes understanding and agrees to plan    Patient offered & declined to schedule next visit. Said he will stop in on Thursday or Friday this week for INR when it works best for him.    Education provided:   Please call back if any changes to your diet, medications or how you've been taking warfarin  Goal range and lab monitoring: goal range and significance of current result and Importance of following up at instructed interval  Importance of notifying anticoagulation clinic for: changes in medications; a sooner lab recheck maybe needed and diarrhea, nausea/vomiting, reduced intake, cold/flu, and/or infections; a sooner lab recheck maybe needed  Contact [phone] with any changes, questions or concerns.     Plan made with [facility] Pharmacist Dot Vallecillo, RN  Anticoagulation  Clinic  2/12/2024    _______________________________________________________________________     Anticoagulation Episode Summary       Current INR goal:  2.0-3.0   TTR:  --   Target end date:  5/2/2024   Send INR reminders to:  MIGUELINA SIMPSON    Indications    Bicuspid aortic valve [Q23.1]  Thrombocytopenia (H24) [D69.6]  Hyperlipidemia LDL goal <70 [E78.5]  Hypertension goal BP (blood pressure) < 140/90 [I10]  History of coronary artery bypass surgery [Z95.1]  Hospital discharge follow-up [Z09]  History of GI bleed [Z87.19]  Atrial fibrillation  unspecified type (H) [I48.91]  Benign prostatic hyperplasia without urinary obstruction [N40.0]  History of transcatheter aortic valve implantation (LASHAE) [Z95.2]             Comments:               Anticoagulation Care Providers       Provider Role Specialty Phone number    Stew Saravia MD Referring Family Medicine [phone]

## 2024-02-14 ENCOUNTER — HOSPITAL ENCOUNTER (OUTPATIENT)
Dept: CARDIAC REHAB | Facility: HOSPITAL | Age: 74
Discharge: HOME | End: 2024-02-14
Attending: INTERNAL MEDICINE
Payer: COMMERCIAL

## 2024-02-14 DIAGNOSIS — Z95.2 AORTIC VALVE PROSTHESIS PRESENT: ICD-10-CM

## 2024-02-14 PROCEDURE — 93798 PHYS/QHP OP CAR RHAB W/ECG: CPT

## 2024-02-14 PROCEDURE — 93797 PHYS/QHP OP CAR RHAB WO ECG: CPT | Mod: 59

## 2024-02-16 ENCOUNTER — LAB (OUTPATIENT)
Dept: LAB | Facility: CLINIC | Age: 74
End: 2024-02-16
Payer: COMMERCIAL

## 2024-02-16 ENCOUNTER — ANTICOAGULATION THERAPY VISIT (OUTPATIENT)
Dept: ANTICOAGULATION | Facility: CLINIC | Age: 74
End: 2024-02-16

## 2024-02-16 ENCOUNTER — HOSPITAL ENCOUNTER (OUTPATIENT)
Dept: CARDIAC REHAB | Facility: HOSPITAL | Age: 74
Discharge: HOME | End: 2024-02-16
Attending: INTERNAL MEDICINE
Payer: COMMERCIAL

## 2024-02-16 DIAGNOSIS — Z09 HOSPITAL DISCHARGE FOLLOW-UP: ICD-10-CM

## 2024-02-16 DIAGNOSIS — Z95.2 HISTORY OF TRANSCATHETER AORTIC VALVE IMPLANTATION (TAVI): ICD-10-CM

## 2024-02-16 DIAGNOSIS — Z95.1 HISTORY OF CORONARY ARTERY BYPASS SURGERY: ICD-10-CM

## 2024-02-16 DIAGNOSIS — Q23.81 BICUSPID AORTIC VALVE: Primary | ICD-10-CM

## 2024-02-16 DIAGNOSIS — I10 HYPERTENSION GOAL BP (BLOOD PRESSURE) < 140/90: ICD-10-CM

## 2024-02-16 DIAGNOSIS — N40.0 BENIGN PROSTATIC HYPERPLASIA WITHOUT URINARY OBSTRUCTION: ICD-10-CM

## 2024-02-16 DIAGNOSIS — E78.5 HYPERLIPIDEMIA LDL GOAL <70: ICD-10-CM

## 2024-02-16 DIAGNOSIS — Z87.19 HISTORY OF GI BLEED: ICD-10-CM

## 2024-02-16 DIAGNOSIS — D69.6 THROMBOCYTOPENIA (H): ICD-10-CM

## 2024-02-16 DIAGNOSIS — I48.91 ATRIAL FIBRILLATION, UNSPECIFIED TYPE (H): ICD-10-CM

## 2024-02-16 LAB — INR BLD: 2.2 (ref 0.9–1.1)

## 2024-02-16 PROCEDURE — 85610 PROTHROMBIN TIME: CPT

## 2024-02-16 PROCEDURE — 93798 PHYS/QHP OP CAR RHAB W/ECG: CPT

## 2024-02-16 PROCEDURE — 36416 COLLJ CAPILLARY BLOOD SPEC: CPT

## 2024-02-16 NOTE — PROGRESS NOTES
ANTICOAGULATION MANAGEMENT     Wiley Storey 73 year old male is on warfarin with therapeutic INR result. (Goal INR 2.0-3.0)    Recent labs: (last 7 days)     02/16/24  1336   INR 2.2*       ASSESSMENT     Source(s): Chart Review and Patient/Caregiver Call     Warfarin doses taken: Warfarin taken as instructed  Diet: No new diet changes identified  Medication/supplement changes: None noted  New illness, injury, or hospitalization: No  Signs or symptoms of bleeding or clotting: No  Previous result: Therapeutic last visit; previously outside of goal range  Additional findings: Recent heart valve replacement in last 10 weeks 2/1/24 [facility]E       PLAN     Recommended plan for no diet, medication or health factor changes affecting INR     Dosing Instructions: Continue your current warfarin dose with next INR in 3 days       Summary  As of 2/16/2024      Full warfarin instructions:  4 mg every Tue, Thu, Sat; 3 mg all other days   Next INR check:  2/19/2024               Telephone call with Wiley who verbalizes understanding and agrees to plan.  Patient was at cardiac rehab when I called so I told him I would leave him a voicemail and [employer] message too.    Detailed voice message left for Wiley with dosing instructions and follow up date.   Sent [employer] message with dosing and follow up instructions    Check at provider office visit 2/19    Education provided:   Goal range and lab monitoring: goal range and significance of current result  Contact [phone] with any changes, questions or concerns.     Plan made per [facility] anticoagulation protocol    Linda Land RN  Anticoagulation Clinic  2/16/2024    _______________________________________________________________________     Anticoagulation Episode Summary       Current INR goal:  2.0-3.0   TTR:  100.0% (1 d)   Target end date:  5/2/2024   Send INR reminders to:  MIGUELINA SIMPSON    Indications    Bicuspid aortic valve [Q23.1]  Thrombocytopenia (H24)  [D69.6]  Hyperlipidemia LDL goal <70 [E78.5]  Hypertension goal BP (blood pressure) < 140/90 [I10]  History of coronary artery bypass surgery [Z95.1]  Hospital discharge follow-up [Z09]  History of GI bleed [Z87.19]  Atrial fibrillation  unspecified type (H) [I48.91]  Benign prostatic hyperplasia without urinary obstruction [N40.0]  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

## 2024-02-19 ENCOUNTER — LAB (OUTPATIENT)
Dept: LAB | Facility: CLINIC | Age: 74
End: 2024-02-19
Payer: COMMERCIAL

## 2024-02-19 ENCOUNTER — OFFICE VISIT (OUTPATIENT)
Dept: FAMILY MEDICINE | Facility: CLINIC | Age: 74
End: 2024-02-19
Payer: COMMERCIAL

## 2024-02-19 ENCOUNTER — ANTICOAGULATION THERAPY VISIT (OUTPATIENT)
Dept: ANTICOAGULATION | Facility: CLINIC | Age: 74
End: 2024-02-19

## 2024-02-19 ENCOUNTER — HOSPITAL ENCOUNTER (OUTPATIENT)
Dept: CARDIAC REHAB | Facility: HOSPITAL | Age: 74
Discharge: HOME | End: 2024-02-19
Attending: INTERNAL MEDICINE
Payer: COMMERCIAL

## 2024-02-19 VITALS
TEMPERATURE: 97.6 F | WEIGHT: 157.12 LBS | RESPIRATION RATE: 14 BRPM | DIASTOLIC BLOOD PRESSURE: 80 MMHG | SYSTOLIC BLOOD PRESSURE: 138 MMHG | OXYGEN SATURATION: 98 % | HEIGHT: 66 IN | BODY MASS INDEX: 25.25 KG/M2 | HEART RATE: 55 BPM

## 2024-02-19 DIAGNOSIS — N18.2 STAGE 2 CHRONIC KIDNEY DISEASE: ICD-10-CM

## 2024-02-19 DIAGNOSIS — Z87.19 HISTORY OF GI BLEED: ICD-10-CM

## 2024-02-19 DIAGNOSIS — N40.0 BENIGN PROSTATIC HYPERPLASIA WITHOUT URINARY OBSTRUCTION: ICD-10-CM

## 2024-02-19 DIAGNOSIS — E78.5 HYPERLIPIDEMIA LDL GOAL <70: ICD-10-CM

## 2024-02-19 DIAGNOSIS — I10 HYPERTENSION GOAL BP (BLOOD PRESSURE) < 140/90: ICD-10-CM

## 2024-02-19 DIAGNOSIS — Z09 HOSPITAL DISCHARGE FOLLOW-UP: ICD-10-CM

## 2024-02-19 DIAGNOSIS — I50.32 CHRONIC DIASTOLIC (CONGESTIVE) HEART FAILURE (H): ICD-10-CM

## 2024-02-19 DIAGNOSIS — Z95.2 HISTORY OF TRANSCATHETER AORTIC VALVE IMPLANTATION (TAVI): ICD-10-CM

## 2024-02-19 DIAGNOSIS — D69.6 THROMBOCYTOPENIA (H): ICD-10-CM

## 2024-02-19 DIAGNOSIS — I10 HYPERTENSION GOAL BP (BLOOD PRESSURE) < 140/90: Primary | ICD-10-CM

## 2024-02-19 DIAGNOSIS — I48.91 ATRIAL FIBRILLATION, UNSPECIFIED TYPE (H): ICD-10-CM

## 2024-02-19 DIAGNOSIS — Z95.1 HISTORY OF CORONARY ARTERY BYPASS SURGERY: ICD-10-CM

## 2024-02-19 DIAGNOSIS — Q23.81 BICUSPID AORTIC VALVE: Primary | ICD-10-CM

## 2024-02-19 LAB — INR BLD: 1.9 (ref 0.9–1.1)

## 2024-02-19 PROCEDURE — 93798 PHYS/QHP OP CAR RHAB W/ECG: CPT

## 2024-02-19 PROCEDURE — 36416 COLLJ CAPILLARY BLOOD SPEC: CPT

## 2024-02-19 PROCEDURE — 85610 PROTHROMBIN TIME: CPT

## 2024-02-19 PROCEDURE — 99213 OFFICE O/P EST LOW 20 MIN: CPT | Performed by: FAMILY MEDICINE

## 2024-02-19 RX ORDER — RESPIRATORY SYNCYTIAL VIRUS VACCINE 120MCG/0.5
0.5 KIT INTRAMUSCULAR ONCE
Qty: 1 EACH | Refills: 0 | Status: CANCELLED | OUTPATIENT
Start: 2024-02-19 | End: 2024-02-19

## 2024-02-19 NOTE — PROGRESS NOTES
OFFICE VISIT    Assessment/Plan:     Patient Instructions:    -The blood pressures look great.  -Continue medications as prescribed.  -Continue to follow with the anticoagulation nurses (to help manage the warfarin dosing).  The cardiology team would like for you to continue on the warfarin for at least 3 months and then for you to start on baby aspirin and continue lifelong. Please check in with the heart team for management for this transition period.   -Complete the cholesterol test with the heart doctors when you follow up with them.   -Please follow-up with the heart team at [facility] as you have planned.      Please seek immediate medical attention (go to the emergency room or urgent care) for the following reasons: worsening symptoms, or any concerning changes.        Wiley was seen today for follow up.  Diagnoses and all orders for this visit:    Hypertension goal BP (blood pressure) < 140/90  Chronic diastolic (congestive) heart failure (H)  Hyperlipidemia LDL goal <70  Stage 2 chronic kidney disease: Overall stable.  Patient has been able to walk more and is and not having any chest pain, worsening shortness of breath, or other concerning changes.  Home blood pressures are in the good range and blood pressure today is also in the good range.  Continue blood pressure medications and other medications as prescribed.  Patient will follow-up with cardiology team for appointment as scheduled in 04/2024.  Patient to follow-up with this provider thereafter.        Return in about 3 months (around 5/19/2024) for Medication follow up.    The diagnoses, treatment options, risk, benefits, and recommendations were reviewed with patient/guardian.  Questions were answered to patient's/guardian satisfaction.  Red flag signs were reviewed.  Patient/guardian is in agreement with above plan.      Subjective: 73 year old male with history of CABG x4v (2012 at [facility]), bicuspid aortic valve with stenosis (nonrheumatic)  s/p LASHAE, chronic diastolic congestive heart failure, atrial fibrillation (post CABG; resolved after cardioversion; not on anticoagulation), hyperlipidemia, hypertension, central sleep apnea on EPAP, hypothyroidism, migraine with aura, CKD stage II, history of tobacco abuse in remission who presents to clinic for the following complaints:   Patient presents with:  Follow Up: Heart valve replacement    Patient was last seen on 2/5/2024.  At that time, patient was noted to have elevated blood pressures.  Plan was to monitor the blood pressure for the next 2 weeks and for patient to bring the measurements to clinic.  There is consideration that blood pressures could have been mildly elevated due to patient being new to the clinic and provider.    Since then, blood pressure readings are noted as follows (wrist BP machine):  Staying in the 110-130's/60-70's's and HR 50-60's.      They have been walking around and patient has been doing well. They live in [facility] [place] and they walk the skyways.        6/22/2022  2:01 PM 2/5/2024  5:21 AM 2/5/2024  5:36 AM 2/5/2024  7:35 AM 2/5/2024  8:00 AM 2/5/2024  4:30 PM 2/5/2024  4:40 PM   Vital Signs          Systolic 144 !  187 !  185 !  151 !  127  147 !  151 !    Diastolic 84 !  88 !  95 (H)  72 !  77  84 !  73 !    Pulse 60  64  68  52  54  54  60       2/5/2024  6:00 PM 2/19/2024  3:20 PM   Vital Signs     Systolic 148 !  138    Diastolic 81 !  80    Pulse 58  55         S/p LASHAE, anticoagulation: In regards to the surgery, patient seems to be healing well.  Tolerating activities as above.    Patient has been following with the anticoagulation team.  The patient was recommended to continue to follow with them. Denies any bleeding issues.    Had extensive cholesterol level checked recently, though at an outside facility.    Normal LFTs noted from 1/3/2024.    Hearing: when he was on the baby aspirin, his ear started ringing again. He has stopped the baby aspirin and the ringing in  the ear has persisted.  Plan to continue to monitor.    HM due was reviewed with patient/parent.  Recommendations, risk, benefits were reviewed.  Accepted recommendations were ordered.  Otherwise, patient/parent declined.    Health Maintenance Due   Topic Date Due    HF ACTION PLAN  Never done    ANNUAL REVIEW OF HM ORDERS  Never done    IPV IMMUNIZATION (2 of 3 - Adult catch-up series) 07/16/2008    RSV VACCINE (Pregnancy & 60+) (1 - 1-dose 60+ series) Never done    MEDICARE ANNUAL WELLNESS VISIT  10/08/2013    MICROALBUMIN  09/06/2014    ALT  01/15/2017    LIPID  05/11/2017    ADVANCE CARE PLANNING  03/19/2018       COVID and flu in Sept and Oct 2023, respectively, at [facility] (in [place], [place]).    Immunization History   Administered Date(s) Administered    COVID-19 12+ (2023-24) (MODERNA) 09/15/2023    COVID-19 Monovalent 18+ (Moderna) 02/27/2021, 03/27/2021, 11/14/2021, 04/30/2022, 10/01/2022    Flu, Unspecified 10/01/2023    HepB, Unspecified 10/27/1993    Hepatitis B, Adult 04/28/1993, 05/26/1993    Influenza (High Dose) 3 valent vaccine 11/09/2015, 11/08/2016, 11/10/2017, 10/12/2018    Influenza (IIV3) PF 11/01/2007, 10/07/2009, 10/12/2010, 11/09/2011, 10/08/2012, 11/15/2013    Influenza Vaccine, 6+MO IM (QUADRIVALENT W/PRESERVATIVES) 11/28/2014, 09/01/2021, 10/01/2022    Influenza,INJ,MDCK,PF, IIV3 18+yrs 11/14/2013    Meningococcal ACWY (Menactra ) 06/09/2009    Pneumo Conj 13-V (2010&after) 01/29/2017    Pneumococcal 20 valent Conjugate (Prevnar 20) 02/19/2023    Pneumococcal 23 valent 11/15/2013    Pneumococcal Conjugate, Unspecified 11/15/2013, 02/19/2023    Poliovirus, inactivated (IPV) 06/18/2008    TDAP (Adacel,Boostrix) 02/05/2007, 08/01/2023    Td (Adult), Adsorbed 11/11/2003    Twinrix A/B 06/18/2008, 06/25/2008, 07/10/2008, 05/19/2009    Typhoid IM 06/18/2008    Yellow Fever 06/12/2009    Zoster recombinant adjuvanted (SHINGRIX) 04/11/2023, 08/01/2023    Zoster vaccine, live 01/28/2015         The 10  "point review of system is negative except as stated in the HPI.    Allergies were reviewed and updated.    Objective:   /80   Pulse 55   Temp 97.6  F (36.4  C) (Oral)   Resp 14   Ht 1.687 m (5' 6.42\")   Wt 71.3 kg (157 lb 1.9 oz)   SpO2 98%   BMI 25.04 kg/m    General: Active, alert, nontoxic-appearing.  No acute distress.  HEENT: Normocephalic, atraumatic.  Pupils are equal and round.  Sclera is clear.  Normal external ears. Nares patent.  Moist mucous membranes.    Cardiac: RRR.  S1, S2 present.  3/6 systolic murmur is noted best in the right upper sternal border.  Otherwise no rubs, or gallops.   Respiratory/chest: Clear to auscultation bilaterally.  No wheezes, rales, rhonchi.  Breathing is not labored.  No accessory muscle usage.  Extremities: Voluntary movements intact.  No edema in the lower extremities.  Integumentary: No concerning rash or skin changes appreciated.        Stew Saravia MD  [facility] [place] [address]  Phone: [phone]  Fax: [phone]    2/20/2024  3:11 PM          Current Outpatient Medications   Medication    amoxicillin (AMOXIL) 500 MG capsule    Blood Pressure Monitoring (B-D ASSURE BPM/AUTO ARM CUFF) MISC    Cholecalciferol (VITAMIN D PO)    CRANBERRY EXTRACT PO    ezetimibe (ZETIA) 10 MG tablet    Multiple Vitamins-Minerals (MULTIVITAL PO)    ORDER FOR DME    saw palmetto 450 MG CAPS capsule    vitamin B complex with vitamin C (VITAMIN  B COMPLEX) tablet    vitamin B-12 (CYANOCOBALAMIN) 1000 MCG tablet    vitamin C (ASCORBIC ACID) 1000 MG TABS    warfarin ANTICOAGULANT (COUMADIN) 2 MG tablet    aspirin EC 81 MG tablet     No current facility-administered medications for this visit.       Allergies   Allergen Reactions    Aspirin      Early 1990s, took one dose of aspirin + ibuprofen and had severe GI bleed - hospitalization required    Ibuprofen      Early 1990s, took one dose of aspirin + ibuprofen and had severe GI bleed " -hospitalization required       Patient Active Problem List    Diagnosis Date Noted    Non-melanoma skin cancer 02/05/2024     Priority: Medium    History of GI bleed 02/05/2024     Priority: Medium     In 1990's after taking aspirin/ibuprofen.      Thrombocytopenia (H24) 02/05/2024     Priority: Medium    Hospital discharge follow-up 02/05/2024     Priority: Medium    Atrial fibrillation, unspecified type (H) 02/05/2024     Priority: Medium    History of transcatheter aortic valve implantation (LASHAE) 02/05/2024     Priority: Medium    Warfarin anticoagulation 02/05/2024     Priority: Medium     Stop warfarin on 05/01/2024. Start ASA w/lifelong use there after. See 02/05/2024 note.       Chronic diastolic (congestive) heart failure (H) 02/01/2024     Priority: Medium    Stage 2 chronic kidney disease 02/01/2024     Priority: Medium    Bicuspid aortic valve 05/16/2023     Priority: Medium    History of coronary artery bypass surgery 12/05/2022     Priority: Medium     Last Assessment & Plan:    Formatting of this note might be different from the original.   Fortunately, he is not had symptoms of exertional angina. The patient is aware of the need for Secondary prevention through aggressive CV risk factor modifications to include: blood pressure control, LDL control and daily exercise (per guidelines), etc.      Tobacco abuse, in remission 12/05/2022     Priority: Medium     Last Assessment & Plan:    Formatting of this note might be different from the original.   Fortunately, he is not smoking at this time.      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   Tylenol if needed.      History of atrial fibrillation 07/11/2019     Priority: Medium     Formatting of this note might be different from the original.   3/12:  s/p MAZE during CABG     7/12:  s/p cardioversion  Formatting of this note might be different from the original.   3/12:  s/p MAZE during CABG     7/12:  s/p cardioversion      Mild aortic stenosis 07/11/2019     Priority: Medium     Formatting of this note might be different from the original.   Long-standing mild AS  5/17:  Echo:  mild AS  Formatting of this note might be different from the original.   Long-standing mild AS  5/17:  Echo:  mild AS      Last Assessment & Plan:    Formatting of this note might be different from the original.   He was able to see Dr. Anna for his aortic stenosis in the recommendation was for close follow-up to observe progression of disease.  Repeat echo just recently showed a mean gradient had gone from 20/9 up to 36 mmHg, with a valve area constant of 0.8 cm2.  He continues to be quite vigorous without any cardiac symptoms.  He is planning on follow-up visit with Dr. Anna in the near future.  Formatting of this note might be different from the original.   Formatting of this note might be different from the original.    Long-standing mild AS  5/17:  Echo:  mild AS   Formatting of this note might be different from the original.    Long-standing mild AS  5/17:  Echo:  mild AS       Last Assessment & Plan:     Formatting of this note might be different from the original.    He was able to see Dr. Anna for his aortic stenosis in the recommendation was for close follow-up to observe progression of disease.  Repeat echo just recently showed a mean gradient had gone from 20/9 up to 36 mmHg, with a valve area constant of 0.8 cm2.  He continues to be quite  vigorous without any cardiac symptoms.  He is planning on follow-up visit with Dr. Anna in the near future.      Rosacea 07/11/2019     Priority: Medium    Tinnitus 07/11/2019     Priority: Medium     Formatting of this note might be different from the original.   2/18:  Audiogram:  Bilateral moderate high frequency sensorineural hearing loss from 5933-4892 Hz with normal hearing 250-2000 Hz. Speech discrimination ability in quiet is excellent at normal conversational levels. Not a candidate for amplification  Formatting of this note might be different from the original.   2/18:  Audiogram:  Bilateral moderate high frequency sensorineural hearing loss from 9497-2102 Hz with normal hearing 250-2000 Hz. Speech discrimination ability in quiet is excellent at normal conversational levels. Not a candidate for amplification  Formatting of this note might be different from the original.   2/18:  Audiogram:  Bilateral moderate high frequency sensorineural hearing loss from 8915-5252 Hz with normal hearing 250-2000 Hz. Speech discrimination ability in quiet is excellent at normal conversational levels. Not a candidate for amplification  Formatting of this note might be different from the original.   Formatting of this note might be different from the original.    2/18:  Audiogram:  Bilateral moderate high frequency sensorineural hearing loss from 6549-0638 Hz with normal hearing 250-2000 Hz. Speech discrimination ability in quiet is excellent at normal conversational levels. Not a candidate for amplification   Formatting of this note might be different from the original.    2/18:  Audiogram:  Bilateral moderate high frequency sensorineural hearing loss from 2660-5376 Hz with normal hearing 250-2000 Hz. Speech discrimination ability in quiet is excellent at normal conversational levels. Not a candidate for amplification   Formatting of this note might be different from the original.    2/18:  Audiogram:  Bilateral moderate  high frequency sensorineural hearing loss from 9239-0688 Hz with normal hearing 250-2000 Hz. Speech discrimination ability in quiet is excellent at normal conversational levels. Not a candidate for amplification      Recurrent UTI 08/01/2018     Priority: Medium     Formatting of this note might be different from the original.   11/17:     12/17:  Triphasic CT Kidneys:  No stones, microlobulated contour of post-inf bladder wall, likely trabeculations; correlate w/ cysto   1/18:  Cysto:  normal  Formatting of this note might be different from the original.   11/17:     12/17:  Triphasic CT Kidneys:  No stones, microlobulated contour of post-inf bladder wall, likely trabeculations; correlate w/ cysto   1/18:  Cysto:  normal      Primary central sleep apnea 02/07/2017     Priority: Medium     Formatting of this note might be different from the original.   5/31/17:  Sleep study titration:  ASV auto w/ EPAP min 5  max 15; PS min 0  max 15; resp events incl central apneas were controlled at these settings  Formatting of this note might be different from the original.   5/31/17:  Sleep study titration:  ASV auto w/ EPAP min 5  max 15; PS min 0  max 15; resp events incl central apneas were controlled at these settings  Formatting of this note might be different from the original.   Formatting of this note might be different from the original.    5/31/17:  Sleep study titration:  ASV auto w/ EPAP min 5  max 15; PS min 0  max 15; resp events incl central apneas were controlled at these settings   Formatting of this note might be different from the original.    5/31/17:  Sleep study titration:  ASV auto w/ EPAP min 5  max 15; PS min 0  max 15; resp events incl central apneas were controlled at these settings      Advanced directives, counseling/discussion 03/19/2013     Priority: Medium     Advance Care Planning:   ACP Review and Resources Provided:  Reviewed chart for advance care plan.  Wiley Storey has an up  to date advance care plan on file. See additional documentation in Problem List and click on code status for document details. Confirmed/documented designated decision maker(s). See permanent comments section of demographics in clinical tab.   Added by Viki Olmedo on 3/19/2013            NSVT (nonsustained ventricular tachycardia) (H)      Priority: Medium    Hyperlipidemia LDL goal <70 03/07/2012     Priority: Medium    Intermediate coronary syndrome (H) 03/07/2012     Priority: Medium    Abnormal stress electrocardiogram test 03/06/2012     Priority: Medium    ASCVD (arteriosclerotic cardiovascular disease) 03/06/2012     Priority: Medium    Chest discomfort 02/09/2012     Priority: Medium    Hyperthyroidism 02/08/2012     Priority: Medium     Was taking supplements from herbalist with iodine in them -   Treated with methimazole  Avoid IV contrast - iodine or supplements      Hypertension goal BP (blood pressure) < 140/90 02/07/2012     Priority: Medium     On lisinopril -   Started in 2003        Sleep apnea 02/07/2012     Priority: Medium     Sleeps with CPAP -        Atrial fibrillation (H) 02/07/2012     Priority: Medium     New onset 2012 -   Getting ECHO to look at aortic vavle (hx of sclerosis)  On coumadin (hx of ASCVD s/p bypass) from 2012 - 9/2015 (stopped by cardiology)    S/p maze procedure      Aortic sclerosis 10/11/2011     Priority: Medium    Esophageal reflux 10/11/2011     Priority: Medium    Headache 10/11/2011     Priority: Medium     Problem list name updated by automated process. Provider to review      Thyroid nodule 10/11/2011     Priority: Medium     Needs repeat thyroid ultrasound in 2014      Lumbago 11/05/2004     Priority: Medium    Peptic ulcer without hemorrhage or perforation 11/05/2004     Priority: Medium     Formatting of this note might be different from the original.   nSAID-INDUCED         Family History   Problem Relation Age of Onset    Cardiovascular Unknown          aortic aneurysm, CAD    Cancer Mother 86        pancreatic    C.A.D. Mother         passed away at 86    C.A.D. Brother         stent    Diabetes Maternal Grandmother     Hypertension Brother        Past Surgical History:   Procedure Laterality Date    ANESTHESIA CARDIOVERSION  2012    Procedure: ANESTHESIA CARDIOVERSION;  Anesthesia Off site Cardioversion;  Surgeon: Provider, Generic Anesthesia;  Location: [facility] OR    BYPASS GRAFT ARTERY CORONARY  2012    Procedure:BYPASS GRAFT ARTERY CORONARY; Median Sternotomy, Coronary Artery Bypass Graft X4 Using the Left Internal Mamary and Left Saphenous Vein with MAZE Procedure, and On Pump Oxygenator.; Surgeon:MICHOACANO HOANG; Location:[facility] OR    COLONOSCOPY N/A 2016    Procedure: COLONOSCOPY;  Surgeon: Brad Peralta MD;  Location:  GI    COLONOSCOPY N/A 11/10/2016    Procedure: COLONOSCOPY;  Surgeon: Brad Peralta MD;  Location:  GI    MAZE PROCEDURE  2012    Procedure:MAZE PROCEDURE; Surgeon:MICHOACANO HOANG; Location:[facility] OR    ulcer operation  1991    ZZC TRANSCATHETER AORTIC VALVE REPLACE (TAVR/LASHAE), W/PROSTH VALVE; TRANSCAVAL APPR  2024    See [place] Admission from 2024        Social History     Socioeconomic History    Marital status:      Spouse name: Not on file    Number of children: Not on file    Years of education: Not on file    Highest education level: Not on file   Occupational History    Not on file   Tobacco Use    Smoking status: Former     Types: Cigarettes     Quit date: 1983     Years since quittin.1     Passive exposure: Never    Smokeless tobacco: Never   Vaping Use    Vaping Use: Never used   Substance and Sexual Activity    Alcohol use: Yes     Comment: occ wine    Drug use: No    Sexual activity: Yes     Partners: Female   Other Topics Concern    Parent/sibling w/ CABG, MI or angioplasty before 65F 55M? No   Social History Narrative    Not on file     Social Determinants of Health      Financial Resource Strain: Low Risk  (2/5/2024)    Financial Resource Strain     Within the past 12 months, have you or your family members you live with been unable to get utilities (heat, electricity) when it was really needed?: No   Food Insecurity: Low Risk  (2/5/2024)    Food Insecurity     Within the past 12 months, did you worry that your food would run out before you got money to buy more?: No     Within the past 12 months, did the food you bought just not last and you didn t have money to get more?: No   Transportation Needs: Low Risk  (2/5/2024)    Transportation Needs     Within the past 12 months, has lack of transportation kept you from medical appointments, getting your medicines, non-medical meetings or appointments, work, or from getting things that you need?: No   Physical Activity: Not on file   Stress: Not on file   Social Connections: Not on file   Interpersonal Safety: Not on file   Housing Stability: Low Risk  (2/5/2024)    Housing Stability     Do you have housing? : Yes     Are you worried about losing your housing?: No       Answers submitted by the patient for this visit:  General Questionnaire (Submitted on 2/19/2024)  Chief Complaint: Chronic problems general questions HPI Form  What is the reason for your visit today? : heart valve replacement

## 2024-02-19 NOTE — PATIENT INSTRUCTIONS
-Thank you for choosing the [facility].  -It was a pleasure to see you today.  -Please take a look at the information below for more specific details regarding the treatment plan and recommendations.  -In this after visit summary is a list of your medications and specific instructions.  Please review this carefully as there may be changes made to your medication list.  -If there are any particular questions or concerns, please feel free to reach out to Dr. Saravia.  -If any labs have been completed, we will reach out to you about results.  If the results are normal or not concerning, a letter or [employer] message will be sent to you.  If any follow-up is needed, either Dr. Saravia or the nurse will give you a call.  If you have not heard regarding results after 2 weeks, please reach out to the clinic.    Patient Instructions:    -The blood pressures look great.  -Continue medications as prescribed.  -Continue to follow with the anticoagulation nurses (to help manage the warfarin dosing).  The cardiology team would like for you to continue on the warfarin for at least 3 months and then for you to start on baby aspirin and continue lifelong. Please check in with the heart team for management for this transition period.   -Complete the cholesterol test with the heart doctors when you follow up with them.   -Please follow-up with the heart team at [facility] as you have planned.      Please seek immediate medical attention (go to the emergency room or urgent care) for the following reasons: worsening symptoms, or any concerning changes.      --------------------------------------------------------------------------------------------------------------------    -We are always looking for ways to improve.  You may be selected to receive a survey regarding your visit today.  We encourage you to complete the survey and provide specific, constructive feedback to help us improve our processes.  Thank you for  your time!  -Please review the contact information listed on the after visit summary and in the electronic chart.  Below is the phone number that we have on file.  If there are any changes that are needed to be made, please reach out to the clinic.  [phone] (home) [phone] (work)

## 2024-02-19 NOTE — PROGRESS NOTES
ANTICOAGULATION MANAGEMENT     Wiley Storey 73 year old male is on warfarin with subtherapeutic INR result. (Goal INR 2.0-3.0)    Recent labs: (last 7 days)     02/19/24  1510   INR 1.9*       ASSESSMENT     Source(s): Chart Review and Patient/Caregiver Call     Warfarin doses taken: Warfarin taken as instructed  Diet: No new diet changes identified  Medication/supplement changes: None noted  New illness, injury, or hospitalization: No  Signs or symptoms of bleeding or clotting: No  Previous result: Therapeutic last 2(+) visits  Additional findings: Recent heart valve replacement in last 10 weeks (2/1/24)       PLAN     Recommended plan for no diet, medication or health factor changes affecting INR     Dosing Instructions: Increase your warfarin dose (16.7% change) with next INR in 3-4 days       Summary  As of 2/19/2024      Full warfarin instructions:  4 mg every day   Next INR check:  2/22/2024               Telephone call with Wiley who verbalizes understanding and agrees to plan and who agrees to plan and repeated back plan correctly    Patient offered & declined to schedule next visit. Said he will just stop in on Thursday for INR.    Education provided:   Goal range and lab monitoring: goal range and significance of current result and Importance of following up at instructed interval  Contact [phone] with any changes, questions or concerns.     Plan made per [facility] anticoagulation protocol    Kiana Vallecillo RN  Anticoagulation Clinic  2/19/2024    _______________________________________________________________________     Anticoagulation Episode Summary       Current INR goal:  2.0-3.0   TTR:  77.9% (4 d)   Target end date:  5/2/2024   Send INR reminders to:  MIGUELINA SIMPSON    Indications    Bicuspid aortic valve [Q23.1]  Thrombocytopenia (H24) [D69.6]  Hyperlipidemia LDL goal <70 [E78.5]  Hypertension goal BP (blood pressure) < 140/90 [I10]  History of coronary artery bypass surgery [Z95.1]  Hospital  discharge follow-up [Z09]  History of GI bleed [Z87.19]  Atrial fibrillation  unspecified type (H) [I48.91]  Benign prostatic hyperplasia without urinary obstruction [N40.0]  History of transcatheter aortic valve implantation (ALSHAE) [Z95.2]             Comments:               Anticoagulation Care Providers       Provider Role Specialty Phone number    Stew Saravia MD Referring Family Medicine [phone]

## 2024-02-21 ENCOUNTER — HOSPITAL ENCOUNTER (OUTPATIENT)
Dept: CARDIAC REHAB | Facility: HOSPITAL | Age: 74
Discharge: HOME | End: 2024-02-21
Attending: INTERNAL MEDICINE
Payer: COMMERCIAL

## 2024-02-21 PROCEDURE — 93798 PHYS/QHP OP CAR RHAB W/ECG: CPT

## 2024-02-22 ENCOUNTER — LAB (OUTPATIENT)
Dept: LAB | Facility: CLINIC | Age: 74
End: 2024-02-22
Payer: COMMERCIAL

## 2024-02-22 ENCOUNTER — ANTICOAGULATION THERAPY VISIT (OUTPATIENT)
Dept: ANTICOAGULATION | Facility: CLINIC | Age: 74
End: 2024-02-22

## 2024-02-22 DIAGNOSIS — D69.6 THROMBOCYTOPENIA (H): ICD-10-CM

## 2024-02-22 DIAGNOSIS — Z87.19 HISTORY OF GI BLEED: ICD-10-CM

## 2024-02-22 DIAGNOSIS — Z95.2 HISTORY OF TRANSCATHETER AORTIC VALVE IMPLANTATION (TAVI): ICD-10-CM

## 2024-02-22 DIAGNOSIS — E78.5 HYPERLIPIDEMIA LDL GOAL <70: ICD-10-CM

## 2024-02-22 DIAGNOSIS — N40.0 BENIGN PROSTATIC HYPERPLASIA WITHOUT URINARY OBSTRUCTION: ICD-10-CM

## 2024-02-22 DIAGNOSIS — Z95.1 HISTORY OF CORONARY ARTERY BYPASS SURGERY: ICD-10-CM

## 2024-02-22 DIAGNOSIS — I48.91 ATRIAL FIBRILLATION, UNSPECIFIED TYPE (H): ICD-10-CM

## 2024-02-22 DIAGNOSIS — Z09 HOSPITAL DISCHARGE FOLLOW-UP: ICD-10-CM

## 2024-02-22 DIAGNOSIS — I10 HYPERTENSION GOAL BP (BLOOD PRESSURE) < 140/90: ICD-10-CM

## 2024-02-22 DIAGNOSIS — Q23.81 BICUSPID AORTIC VALVE: Primary | ICD-10-CM

## 2024-02-22 LAB — INR BLD: 2 (ref 0.9–1.1)

## 2024-02-22 PROCEDURE — 36416 COLLJ CAPILLARY BLOOD SPEC: CPT

## 2024-02-22 PROCEDURE — 85610 PROTHROMBIN TIME: CPT

## 2024-02-22 NOTE — PROGRESS NOTES
ANTICOAGULATION MANAGEMENT     Wiley Storey 73 year old male is on warfarin with therapeutic INR result. (Goal INR 2.0-3.0)    Recent labs: (last 7 days)     02/22/24  1409   INR 2.0*       ASSESSMENT     Source(s): Chart Review and Patient/Caregiver Call     Warfarin doses taken: Warfarin taken as instructed  Diet: No new diet changes identified  Medication/supplement changes: None noted  New illness, injury, or hospitalization: No  Signs or symptoms of bleeding or clotting: No  Previous result: Subtherapeutic  Additional findings: Recent heart valve replacement in last 10 weeks       PLAN     Recommended plan for ongoing change(s) affecting INR     Dosing Instructions: Increase your warfarin dose (7.1% change) with next INR in 4 days       Summary  As of 2/22/2024      Full warfarin instructions:  6 mg every Thu; 4 mg all other days   Next INR check:  2/26/2024               Telephone call with Wiley who agrees to plan and repeated back plan correctly    Patient offered & declined to schedule next visit    Education provided:   Contact [phone] with any changes, questions or concerns.     Plan made per [facility] anticoagulation protocol    Chelita Patel RN  Anticoagulation Clinic  2/22/2024    _______________________________________________________________________     Anticoagulation Episode Summary       Current INR goal:  2.0-3.0   TTR:  47.6% (1 wk)   Target end date:  5/2/2024   Send INR reminders to:  MIGUELINA SIMPSON    Indications    Bicuspid aortic valve [Q23.1]  History of coronary artery bypass surgery [Z95.1]  Thrombocytopenia (H24) [D69.6]  Atrial fibrillation  unspecified type (H) [I48.91]  History of transcatheter aortic valve implantation (LASHAE) [Z95.2]             Comments:               Anticoagulation Care Providers       Provider Role Specialty Phone number    Stew Saravia MD Referring Family Medicine [phone]

## 2024-02-26 ENCOUNTER — LAB (OUTPATIENT)
Dept: LAB | Facility: CLINIC | Age: 74
End: 2024-02-26
Payer: COMMERCIAL

## 2024-02-26 ENCOUNTER — HOSPITAL ENCOUNTER (OUTPATIENT)
Dept: CARDIAC REHAB | Facility: HOSPITAL | Age: 74
Discharge: HOME | End: 2024-02-26
Attending: INTERNAL MEDICINE
Payer: COMMERCIAL

## 2024-02-26 ENCOUNTER — ANTICOAGULATION THERAPY VISIT (OUTPATIENT)
Dept: ANTICOAGULATION | Facility: CLINIC | Age: 74
End: 2024-02-26

## 2024-02-26 DIAGNOSIS — Z95.2 HISTORY OF TRANSCATHETER AORTIC VALVE IMPLANTATION (TAVI): ICD-10-CM

## 2024-02-26 DIAGNOSIS — Q23.81 BICUSPID AORTIC VALVE: Primary | ICD-10-CM

## 2024-02-26 DIAGNOSIS — Z95.1 HISTORY OF CORONARY ARTERY BYPASS SURGERY: ICD-10-CM

## 2024-02-26 DIAGNOSIS — I48.91 ATRIAL FIBRILLATION, UNSPECIFIED TYPE (H): ICD-10-CM

## 2024-02-26 DIAGNOSIS — D69.6 THROMBOCYTOPENIA (H): ICD-10-CM

## 2024-02-26 LAB — INR BLD: 2.7 (ref 0.9–1.1)

## 2024-02-26 PROCEDURE — 85610 PROTHROMBIN TIME: CPT

## 2024-02-26 PROCEDURE — 36416 COLLJ CAPILLARY BLOOD SPEC: CPT

## 2024-02-26 PROCEDURE — 93798 PHYS/QHP OP CAR RHAB W/ECG: CPT

## 2024-02-26 NOTE — PROGRESS NOTES
ANTICOAGULATION MANAGEMENT     Wiley Storey 73 year old male is on warfarin with therapeutic INR result. (Goal INR 2.0-3.0)    Recent labs: (last 7 days)     02/26/24  1515   INR 2.7*       ASSESSMENT     Source(s): Chart Review and Patient/Caregiver Call     Warfarin doses taken: Warfarin taken as instructed  Diet: No new diet changes identified  Medication/supplement changes: None noted  New illness, injury, or hospitalization: No  Signs or symptoms of bleeding or clotting: No  Previous result: Therapeutic last visit; previously outside of goal range  Additional findings: Recent heart valve replacement in last 10 weeks (2/1/24)       PLAN     Recommended plan for no diet, medication or health factor changes affecting INR     Dosing Instructions: Continue your current warfarin dose with next INR in 4-7 days       Summary  As of 2/26/2024      Full warfarin instructions:  6 mg every Thu; 4 mg all other days   Next INR check:  3/1/2024               Telephone call with Wiley who verbalizes understanding and agrees to plan    Lab visit scheduled    Education provided:   Goal range and lab monitoring: goal range and significance of current result  Contact [phone] with any changes, questions or concerns.     Plan made per [facility] anticoagulation protocol    Kiana Vallecillo RN  Anticoagulation Clinic  2/26/2024    _______________________________________________________________________     Anticoagulation Episode Summary       Current INR goal:  2.0-3.0   TTR:  65.8% (1.6 wk)   Target end date:  5/2/2024   Send INR reminders to:  MIGUELINA SIMPSON    Indications    Bicuspid aortic valve [Q23.1]  History of coronary artery bypass surgery [Z95.1]  Thrombocytopenia (H24) [D69.6]  Atrial fibrillation  unspecified type (H) [I48.91]  History of transcatheter aortic valve implantation (LASHAE) [Z95.2]             Comments:               Anticoagulation Care Providers       Provider Role Specialty Phone number    Stew Saravia  MD [facility] Family Medicine [phone]

## 2024-02-28 ENCOUNTER — HOSPITAL ENCOUNTER (OUTPATIENT)
Dept: CARDIAC REHAB | Facility: HOSPITAL | Age: 74
Discharge: HOME | End: 2024-02-28
Attending: INTERNAL MEDICINE
Payer: COMMERCIAL

## 2024-02-28 PROCEDURE — 93798 PHYS/QHP OP CAR RHAB W/ECG: CPT

## 2024-02-29 ENCOUNTER — TELEPHONE (OUTPATIENT)
Dept: FAMILY MEDICINE | Facility: CLINIC | Age: 74
End: 2024-02-29
Payer: COMMERCIAL

## 2024-02-29 DIAGNOSIS — D69.6 THROMBOCYTOPENIA (H): ICD-10-CM

## 2024-02-29 DIAGNOSIS — Q23.81 BICUSPID AORTIC VALVE: Primary | ICD-10-CM

## 2024-02-29 DIAGNOSIS — Z95.2 HISTORY OF TRANSCATHETER AORTIC VALVE IMPLANTATION (TAVI): ICD-10-CM

## 2024-02-29 DIAGNOSIS — I48.91 ATRIAL FIBRILLATION, UNSPECIFIED TYPE (H): ICD-10-CM

## 2024-02-29 DIAGNOSIS — Z95.1 HISTORY OF CORONARY ARTERY BYPASS SURGERY: ICD-10-CM

## 2024-02-29 RX ORDER — WARFARIN SODIUM 2 MG/1
TABLET ORAL
Qty: 190 TABLET | Refills: 0 | Status: ON HOLD | OUTPATIENT
Start: 2024-02-29 | End: 2024-04-17

## 2024-02-29 NOTE — TELEPHONE ENCOUNTER
General Call      Reason for Call:  PT IS CALLING BACK TO SPEAK TO INR NURSE ABOUT WARFIN, AND HE WANTS TO MAKE SURE IT IS CORRECT.     What are your questions or concerns:  SEE ABOVE    Date of last appointment with provider: 2/26/24    Could we send this information to you in [email or web address] or would you prefer to receive a phone call?:   Patient would prefer a phone call   Okay to leave a detailed message?: Yes at Cell number on file:    Telephone Information:   Mobile [phone]

## 2024-02-29 NOTE — TELEPHONE ENCOUNTER
ANTICOAGULATION MANAGEMENT:  Medication Refill    Anticoagulation Summary  As of 2/29/2024      Warfarin maintenance plan:  6 mg (2 mg x 3) every Thu; 4 mg (2 mg x 2) all other days   Next INR check:  3/1/2024   Target end date:  5/2/2024    Indications    Bicuspid aortic valve [Q23.1]  History of coronary artery bypass surgery [Z95.1]  Thrombocytopenia (H24) [D69.6]  Atrial fibrillation  unspecified type (H) [I48.91]  History of transcatheter aortic valve implantation (LASHAE) [Z95.2]                 Anticoagulation Care Providers       Provider Role Specialty Phone number    Stew Saravia MD Referring Family Medicine [phone]            Refill Criteria    Visit with referring provider/group: Meets criteria: office visit within referring provider group in the last 1 year on 2/19/24    [facility] referral last signed: 02/05/2024; within last year: Yes    Lab monitoring not exceeding 2 weeks overdue: Yes    Wilye meets all criteria for refill. Rx instructions and quantity supplied updated to match patient's current dosing plan.  90 day supply with 0 refills granted per [facility] protocol    Spoke to patient.  His first supply was Jantoven from the [facility].  He just got his first refill at his local [facility] and it is Warfarin.  Confirmed that it is still the same 2mg size pill.  Sent new script to pharmacy signed by [facility] PCP since previous script was from [facility] provider.  Confirmed dosing with patient and he plans to check INR tomorrow.       Linda Land RN  Anticoagulation Clinic

## 2024-03-01 ENCOUNTER — HOSPITAL ENCOUNTER (OUTPATIENT)
Dept: CARDIAC REHAB | Facility: HOSPITAL | Age: 74
Discharge: HOME | End: 2024-03-01
Attending: INTERNAL MEDICINE
Payer: COMMERCIAL

## 2024-03-01 ENCOUNTER — ANTICOAGULATION THERAPY VISIT (OUTPATIENT)
Dept: ANTICOAGULATION | Facility: CLINIC | Age: 74
End: 2024-03-01

## 2024-03-01 ENCOUNTER — LAB (OUTPATIENT)
Dept: LAB | Facility: CLINIC | Age: 74
End: 2024-03-01
Payer: COMMERCIAL

## 2024-03-01 DIAGNOSIS — Q23.81 BICUSPID AORTIC VALVE: Primary | ICD-10-CM

## 2024-03-01 DIAGNOSIS — Z95.2 HISTORY OF TRANSCATHETER AORTIC VALVE IMPLANTATION (TAVI): ICD-10-CM

## 2024-03-01 DIAGNOSIS — D69.6 THROMBOCYTOPENIA (H): ICD-10-CM

## 2024-03-01 DIAGNOSIS — Z95.1 HISTORY OF CORONARY ARTERY BYPASS SURGERY: ICD-10-CM

## 2024-03-01 DIAGNOSIS — I48.91 ATRIAL FIBRILLATION, UNSPECIFIED TYPE (H): ICD-10-CM

## 2024-03-01 LAB — INR BLD: 2.3 (ref 0.9–1.1)

## 2024-03-01 PROCEDURE — 85610 PROTHROMBIN TIME: CPT

## 2024-03-01 PROCEDURE — 93798 PHYS/QHP OP CAR RHAB W/ECG: CPT

## 2024-03-01 PROCEDURE — 36416 COLLJ CAPILLARY BLOOD SPEC: CPT

## 2024-03-01 NOTE — PROGRESS NOTES
ANTICOAGULATION MANAGEMENT     Wiley RAMIREZ Lanernestine 73 year old male is on warfarin with therapeutic INR result. (Goal INR 2.0-3.0)    Recent labs: (last 7 days)     03/01/24  1509   INR 2.3*       ASSESSMENT     Source(s): Chart Review and Patient/Caregiver Call     Warfarin doses taken: Warfarin taken as instructed  Diet: No new diet changes identified  Medication/supplement changes: None noted  New illness, injury, or hospitalization: No  Signs or symptoms of bleeding or clotting: No  Previous result: Therapeutic last 2(+) visits  Additional findings:  Recent heart valve replacement in last 10 weeks (2/1/24). Will be out of town 3/7-3/11.       PLAN     Recommended plan for no diet, medication or health factor changes affecting INR     Dosing Instructions: Increase your warfarin dose (6.7% change) with next INR in 5 days       Summary  As of 3/1/2024      Full warfarin instructions:  6 mg every Mon, Fri; 4 mg all other days   Next INR check:  3/6/2024               Telephone call with Wiley who verbalizes understanding and agrees to plan and who agrees to plan and repeated back plan correctly    Will walk in to lab    Education provided:   Goal range and lab monitoring: goal range and significance of current result, Importance of following up at instructed interval, and frequency of lab work when starting warfarin and importance of following up when instructed (extends after stability established)  Contact [phone] with any changes, questions or concerns.     Plan made per [facility] anticoagulation protocol    Kiana Vallecillo RN  Anticoagulation Clinic  3/1/2024    _______________________________________________________________________     Anticoagulation Episode Summary       Current INR goal:  2.0-3.0   TTR:  74.5% (2.1 wk)   Target end date:  5/2/2024   Send INR reminders to:  MIGUELINA SIMPSON    Indications    Bicuspid aortic valve [Q23.1]  History of coronary artery bypass surgery [Z95.1]  Thrombocytopenia (H24)  [D69.6]  Atrial fibrillation  unspecified type (H) [I48.91]  History of transcatheter aortic valve implantation (LASHAE) [Z95.2]             Comments:               Anticoagulation Care Providers       Provider Role Specialty Phone number    Stew Saravia MD Referring [facility] [phone]

## 2024-03-04 ENCOUNTER — HOSPITAL ENCOUNTER (OUTPATIENT)
Dept: CARDIAC REHAB | Facility: HOSPITAL | Age: 74
Discharge: HOME | End: 2024-03-04
Attending: INTERNAL MEDICINE
Payer: COMMERCIAL

## 2024-03-04 PROCEDURE — 93798 PHYS/QHP OP CAR RHAB W/ECG: CPT

## 2024-03-06 ENCOUNTER — LAB (OUTPATIENT)
Dept: LAB | Facility: CLINIC | Age: 74
End: 2024-03-06
Payer: COMMERCIAL

## 2024-03-06 ENCOUNTER — ANTICOAGULATION THERAPY VISIT (OUTPATIENT)
Dept: ANTICOAGULATION | Facility: CLINIC | Age: 74
End: 2024-03-06

## 2024-03-06 ENCOUNTER — HOSPITAL ENCOUNTER (OUTPATIENT)
Dept: CARDIAC REHAB | Facility: HOSPITAL | Age: 74
Discharge: HOME | End: 2024-03-06
Attending: INTERNAL MEDICINE
Payer: COMMERCIAL

## 2024-03-06 DIAGNOSIS — Z95.1 HISTORY OF CORONARY ARTERY BYPASS SURGERY: ICD-10-CM

## 2024-03-06 DIAGNOSIS — Z95.2 HISTORY OF TRANSCATHETER AORTIC VALVE IMPLANTATION (TAVI): ICD-10-CM

## 2024-03-06 DIAGNOSIS — Q23.81 BICUSPID AORTIC VALVE: Primary | ICD-10-CM

## 2024-03-06 DIAGNOSIS — D69.6 THROMBOCYTOPENIA (H): ICD-10-CM

## 2024-03-06 DIAGNOSIS — I48.91 ATRIAL FIBRILLATION, UNSPECIFIED TYPE (H): ICD-10-CM

## 2024-03-06 LAB — INR BLD: 2.6 (ref 0.9–1.1)

## 2024-03-06 PROCEDURE — 85610 PROTHROMBIN TIME: CPT

## 2024-03-06 PROCEDURE — 36416 COLLJ CAPILLARY BLOOD SPEC: CPT

## 2024-03-06 PROCEDURE — 93798 PHYS/QHP OP CAR RHAB W/ECG: CPT

## 2024-03-06 NOTE — PROGRESS NOTES
ANTICOAGULATION MANAGEMENT     Wiley Storey 73 year old male is on warfarin with therapeutic INR result. (Goal INR 2.0-3.0)    Recent labs: (last 7 days)     03/06/24  1516   INR 2.6*       ASSESSMENT     Source(s): Chart Review and Patient/Caregiver Call     Warfarin doses taken: Warfarin taken as instructed  Diet: No new diet changes identified  Medication/supplement changes: None noted  New illness, injury, or hospitalization: No  Signs or symptoms of bleeding or clotting: No  Previous result: Therapeutic last 2(+) visits-last INR on 3/1 = 2.3 so dose was increased per protocol by 6.7% due to valve replacement within the last 10 weeks.  Additional findings: Recent heart valve replacement in last 10 weeks (2/1/24). Will be out of town 3/7-3/11.        PLAN     Recommended plan for no diet, medication or health factor changes affecting INR     Dosing Instructions: Continue your current warfarin dose with next INR in 6-7 days (after return from trip)      Summary  As of 3/6/2024      Full warfarin instructions:  6 mg every Mon, Fri; 4 mg all other days   Next INR check:  3/13/2024               Telephone call with Wiley who verbalizes understanding and agrees to plan    Patient offered & declined to schedule next visit    Education provided:   Goal range and lab monitoring: goal range and significance of current result  Contact [phone] with any changes, questions or concerns.     Plan made per [facility] anticoagulation protocol    Kiana Vallecillo RN  Anticoagulation Clinic  3/6/2024    _______________________________________________________________________     Anticoagulation Episode Summary       Current INR goal:  2.0-3.0   TTR:  80.7% (2.9 wk)   Target end date:  5/2/2024   Send INR reminders to:  MIGUELINA SIMPSON    Indications    Bicuspid aortic valve [Q23.1]  History of coronary artery bypass surgery [Z95.1]  Thrombocytopenia (H24) [D69.6]  Atrial fibrillation  unspecified type (H) [I48.91]  History of  transcatheter aortic valve implantation (LASHAE) [Z95.2]             Comments:               Anticoagulation Care Providers       Provider Role Specialty Phone number    Stew Saravia MD Referring [facility] Medicine [phone]

## 2024-03-13 ENCOUNTER — LAB (OUTPATIENT)
Dept: LAB | Facility: CLINIC | Age: 74
End: 2024-03-13
Payer: COMMERCIAL

## 2024-03-13 ENCOUNTER — ANTICOAGULATION THERAPY VISIT (OUTPATIENT)
Dept: ANTICOAGULATION | Facility: CLINIC | Age: 74
End: 2024-03-13

## 2024-03-13 ENCOUNTER — HOSPITAL ENCOUNTER (OUTPATIENT)
Dept: CARDIAC REHAB | Facility: HOSPITAL | Age: 74
Discharge: HOME | End: 2024-03-13
Attending: INTERNAL MEDICINE
Payer: COMMERCIAL

## 2024-03-13 DIAGNOSIS — Z95.2 HISTORY OF TRANSCATHETER AORTIC VALVE IMPLANTATION (TAVI): ICD-10-CM

## 2024-03-13 DIAGNOSIS — Q23.81 BICUSPID AORTIC VALVE: Primary | ICD-10-CM

## 2024-03-13 DIAGNOSIS — Z95.1 HISTORY OF CORONARY ARTERY BYPASS SURGERY: ICD-10-CM

## 2024-03-13 DIAGNOSIS — I48.91 ATRIAL FIBRILLATION, UNSPECIFIED TYPE (H): ICD-10-CM

## 2024-03-13 DIAGNOSIS — D69.6 THROMBOCYTOPENIA (H): ICD-10-CM

## 2024-03-13 LAB — INR BLD: 2.4 (ref 0.9–1.1)

## 2024-03-13 PROCEDURE — 85610 PROTHROMBIN TIME: CPT

## 2024-03-13 PROCEDURE — 93798 PHYS/QHP OP CAR RHAB W/ECG: CPT

## 2024-03-13 PROCEDURE — 36416 COLLJ CAPILLARY BLOOD SPEC: CPT

## 2024-03-13 NOTE — PROGRESS NOTES
ANTICOAGULATION MANAGEMENT     Wiley Storey 73 year old male is on warfarin with therapeutic INR result. (Goal INR 2.0-3.0)    Recent labs: (last 7 days)     03/13/24  1515   INR 2.4*       ASSESSMENT     Source(s): Chart Review and Patient/Caregiver Call     Warfarin doses taken: Warfarin taken as instructed  Diet: No new diet changes identified  Medication/supplement changes: None noted  New illness, injury, or hospitalization: No  Signs or symptoms of bleeding or clotting: No  Previous result: Therapeutic last 2(+) visits  Additional findings: Recent heart valve replacement in last 10 weeks       PLAN     Recommended plan for ongoing change(s) affecting INR     Dosing Instructions: Increase your warfarin dose (6.2% change) with next INR in 1 week       Summary  As of 3/13/2024      Full warfarin instructions:  6 mg every Mon, Wed, Fri; 4 mg all other days   Next INR check:  3/20/2024               Telephone call with Wiley who agrees to plan and repeated back plan correctly    Patient offered & declined to schedule next visit    Education provided:   Contact [phone] with any changes, questions or concerns.     Plan made per [facility] anticoagulation protocol    Chelita Patel RN  Anticoagulation Clinic  3/13/2024    _______________________________________________________________________     Anticoagulation Episode Summary       Current INR goal:  2.0-3.0   TTR:  85.6% (3.9 wk)   Target end date:  5/2/2024   Send INR reminders to:  MIGUELINA SIMPSON    Indications    Bicuspid aortic valve [Q23.1]  History of coronary artery bypass surgery [Z95.1]  Thrombocytopenia (H24) [D69.6]  Atrial fibrillation  unspecified type (H) [I48.91]  History of transcatheter aortic valve implantation (LASHAE) [Z95.2]             Comments:               Anticoagulation Care Providers       Provider Role Specialty Phone number    Stew Saravia MD Referring Family Medicine [phone]

## 2024-03-18 ENCOUNTER — HOSPITAL ENCOUNTER (OUTPATIENT)
Dept: CARDIAC REHAB | Facility: HOSPITAL | Age: 74
Discharge: HOME | End: 2024-03-18
Attending: INTERNAL MEDICINE
Payer: COMMERCIAL

## 2024-03-18 PROCEDURE — 93798 PHYS/QHP OP CAR RHAB W/ECG: CPT

## 2024-03-20 ENCOUNTER — HOSPITAL ENCOUNTER (OUTPATIENT)
Dept: CARDIAC REHAB | Facility: HOSPITAL | Age: 74
Discharge: HOME | End: 2024-03-20
Attending: INTERNAL MEDICINE
Payer: COMMERCIAL

## 2024-03-20 ENCOUNTER — LAB (OUTPATIENT)
Dept: LAB | Facility: CLINIC | Age: 74
End: 2024-03-20
Payer: COMMERCIAL

## 2024-03-20 ENCOUNTER — ANTICOAGULATION THERAPY VISIT (OUTPATIENT)
Dept: ANTICOAGULATION | Facility: CLINIC | Age: 74
End: 2024-03-20

## 2024-03-20 DIAGNOSIS — Z95.2 HISTORY OF TRANSCATHETER AORTIC VALVE IMPLANTATION (TAVI): ICD-10-CM

## 2024-03-20 DIAGNOSIS — Z95.1 HISTORY OF CORONARY ARTERY BYPASS SURGERY: ICD-10-CM

## 2024-03-20 DIAGNOSIS — D69.6 THROMBOCYTOPENIA (H): ICD-10-CM

## 2024-03-20 DIAGNOSIS — I48.91 ATRIAL FIBRILLATION, UNSPECIFIED TYPE (H): ICD-10-CM

## 2024-03-20 DIAGNOSIS — Q23.81 BICUSPID AORTIC VALVE: Primary | ICD-10-CM

## 2024-03-20 LAB — INR BLD: 2.5 (ref 0.9–1.1)

## 2024-03-20 PROCEDURE — 93798 PHYS/QHP OP CAR RHAB W/ECG: CPT

## 2024-03-20 PROCEDURE — 85610 PROTHROMBIN TIME: CPT

## 2024-03-20 PROCEDURE — 36416 COLLJ CAPILLARY BLOOD SPEC: CPT

## 2024-03-20 NOTE — PROGRESS NOTES
ANTICOAGULATION MANAGEMENT     Wiley RAMIREZ Lanernestine 73 year old male is on warfarin with therapeutic INR result. (Goal INR 2.0-3.0)    Recent labs: (last 7 days)     03/20/24  1524   INR 2.5*       ASSESSMENT     Source(s): Chart Review and Patient/Caregiver Call     Warfarin doses taken: Warfarin taken as instructed  Diet: No new diet changes identified  Medication/supplement changes: None noted  New illness, injury, or hospitalization: No  Signs or symptoms of bleeding or clotting: No  Previous result: Therapeutic last 2(+) visits dosing increased by 6.2% last week per protocol when INR was 2.4  Additional findings: Recent heart valve replacement in last 10 weeks, 2/1/24 LASHAE, reviewed with patient that warfarin is for 3  months, through 5/1/24        PLAN     Recommended plan for no diet, medication or health factor changes affecting INR     Dosing Instructions: Continue your current warfarin dose with next INR in 1-2 weeks       Summary  As of 3/20/2024      Full warfarin instructions:  6 mg every Mon, Wed, Fri; 4 mg all other days   Next INR check:  3/27/2024               Telephone call with Wiley who verbalizes understanding and agrees to plan    Patient offered & declined to schedule next visit Patient said he would walk into the lab in 1 week     Education provided:   Goal range and lab monitoring: goal range and significance of current result  Contact [phone] with any changes, questions or concerns.     Plan made per [facility] anticoagulation protocol    Linda Land, RN  Anticoagulation Clinic  3/20/2024    _______________________________________________________________________     Anticoagulation Episode Summary       Current INR goal:  2.0-3.0   TTR:  88.5% (1.1 mo)   Target end date:  5/2/2024   Send INR reminders to:  MIGUELINA SIMPSON    Indications    Bicuspid aortic valve [Q23.1]  History of coronary artery bypass surgery [Z95.1]  Thrombocytopenia (H24) [D69.6]  Atrial fibrillation  unspecified  type (H) [I48.91]  History of transcatheter aortic valve implantation (LASHAE) [Z95.2]             Comments:               Anticoagulation Care Providers       Provider Role Specialty Phone number    Stew Saravia MD Referring [facility] Medicine [phone]

## 2024-03-21 ENCOUNTER — HOSPITAL ENCOUNTER (OUTPATIENT)
Dept: CARDIAC REHAB | Facility: HOSPITAL | Age: 74
Discharge: HOME | End: 2024-03-21
Attending: INTERNAL MEDICINE
Payer: COMMERCIAL

## 2024-03-21 PROCEDURE — 93797 PHYS/QHP OP CAR RHAB WO ECG: CPT | Mod: 59

## 2024-03-21 PROCEDURE — 93798 PHYS/QHP OP CAR RHAB W/ECG: CPT

## 2024-03-27 ENCOUNTER — LAB (OUTPATIENT)
Dept: LAB | Facility: CLINIC | Age: 74
End: 2024-03-27
Payer: COMMERCIAL

## 2024-03-27 ENCOUNTER — ANTICOAGULATION THERAPY VISIT (OUTPATIENT)
Dept: ANTICOAGULATION | Facility: CLINIC | Age: 74
End: 2024-03-27

## 2024-03-27 DIAGNOSIS — Z95.2 HISTORY OF TRANSCATHETER AORTIC VALVE IMPLANTATION (TAVI): ICD-10-CM

## 2024-03-27 DIAGNOSIS — D69.6 THROMBOCYTOPENIA (H): ICD-10-CM

## 2024-03-27 DIAGNOSIS — Z95.1 HISTORY OF CORONARY ARTERY BYPASS SURGERY: ICD-10-CM

## 2024-03-27 DIAGNOSIS — I48.91 ATRIAL FIBRILLATION, UNSPECIFIED TYPE (H): ICD-10-CM

## 2024-03-27 DIAGNOSIS — Q23.81 BICUSPID AORTIC VALVE: Primary | ICD-10-CM

## 2024-03-27 LAB — INR BLD: 3 (ref 0.9–1.1)

## 2024-03-27 PROCEDURE — 85610 PROTHROMBIN TIME: CPT

## 2024-03-27 PROCEDURE — 36416 COLLJ CAPILLARY BLOOD SPEC: CPT

## 2024-03-27 NOTE — PROGRESS NOTES
ANTICOAGULATION MANAGEMENT     Wiley Storey 73 year old male is on warfarin with therapeutic INR result. (Goal INR 2.0-3.0)    Recent labs: (last 7 days)     03/27/24  1353   INR 3.0*       ASSESSMENT     Source(s): Chart Review and Patient/Caregiver Call     Warfarin doses taken: Warfarin taken as instructed  Diet: No new diet changes identified  Medication/supplement changes: None noted  New illness, injury, or hospitalization: No  Signs or symptoms of bleeding or clotting: No  Previous result: Therapeutic last 2(+) visits  Additional findings: Recent heart valve replacement in last 10 weeks       PLAN     Recommended plan for ongoing change(s) affecting INR     Dosing Instructions: Continue your current warfarin dose with next INR in 2 weeks       Summary  As of 3/27/2024      Full warfarin instructions:  6 mg every Mon, Wed, Fri; 4 mg all other days   Next INR check:  4/10/2024               Telephone call with Wiley who verbalizes understanding and agrees to plan    Patient offered & declined to schedule next visit    Education provided:   Contact [phone] with any changes, questions or concerns.     Plan made per [facility] anticoagulation protocol    Chelita Patel RN  Anticoagulation Clinic  3/27/2024    _______________________________________________________________________     Anticoagulation Episode Summary       Current INR goal:  2.0-3.0   TTR:  90.4% (1.4 mo)   Target end date:  5/2/2024   Send INR reminders to:  MIUGELINA SIMPSON    Indications    Bicuspid aortic valve [Q23.1]  History of coronary artery bypass surgery [Z95.1]  Thrombocytopenia (H24) [D69.6]  Atrial fibrillation  unspecified type (H) [I48.91]  History of transcatheter aortic valve implantation (LASHAE) [Z95.2]             Comments:               Anticoagulation Care Providers       Provider Role Specialty Phone number    Stew Saravia MD Referring Family Medicine [phone]

## 2024-04-08 ENCOUNTER — NURSE TRIAGE (OUTPATIENT)
Dept: NURSING | Facility: CLINIC | Age: 74
End: 2024-04-08
Payer: COMMERCIAL

## 2024-04-08 LAB — INR (EXTERNAL): 2.9 (ref 0.9–1.1)

## 2024-04-08 NOTE — TELEPHONE ENCOUNTER
Triage call:    Patient reports having bloody stools.    Patient reports having bloody stools twice yesterday & once so far today.    Of note, patient takes warfarin & is monitored by the INR clinic.  Patient's last INR was taken 03/27/2024.  Per patient, he was instructed that he did not have to have his INR checked for two weeks. Patient states he will be getting it checked again tomorrow.    At time of call, patient reports he had to fast yesterday because he had to get labs completed at the [facility] this morning.    Per protocol, it is advised that the patient go to ED now.    Argelia Em RN on 4/8/2024 at 3:01 PM     Reason for Disposition   MODERATE rectal bleeding (small blood clots, passing blood without stool, or toilet water turns red) more than once a day    Additional Information   Negative: Passed out (i.e., fainted, collapsed and was not responding)   Negative: Shock suspected (e.g., cold/pale/clammy skin, too weak to stand, low BP, rapid pulse)   Negative: Vomiting red blood or black (coffee ground) material   Negative: Sounds like a life-threatening emergency to the triager   Negative: SEVERE rectal bleeding (large blood clots; constant or on and off bleeding)   Negative: SEVERE dizziness (e.g., unable to stand, requires support to walk, feels like passing out now)    Protocols used: Rectal Bleeding-A-[place]

## 2024-04-09 ENCOUNTER — DOCUMENTATION ONLY (OUTPATIENT)
Dept: ANTICOAGULATION | Facility: CLINIC | Age: 74
End: 2024-04-09

## 2024-04-09 ENCOUNTER — ANTICOAGULATION THERAPY VISIT (OUTPATIENT)
Dept: ANTICOAGULATION | Facility: CLINIC | Age: 74
End: 2024-04-09

## 2024-04-09 ENCOUNTER — LAB (OUTPATIENT)
Dept: LAB | Facility: CLINIC | Age: 74
End: 2024-04-09
Payer: COMMERCIAL

## 2024-04-09 ENCOUNTER — TELEPHONE (OUTPATIENT)
Dept: FAMILY MEDICINE | Facility: CLINIC | Age: 74
End: 2024-04-09

## 2024-04-09 DIAGNOSIS — Z95.2 HISTORY OF TRANSCATHETER AORTIC VALVE IMPLANTATION (TAVI): ICD-10-CM

## 2024-04-09 DIAGNOSIS — Z95.1 HISTORY OF CORONARY ARTERY BYPASS SURGERY: ICD-10-CM

## 2024-04-09 DIAGNOSIS — D69.6 THROMBOCYTOPENIA (H): ICD-10-CM

## 2024-04-09 DIAGNOSIS — Z95.2 HISTORY OF TRANSCATHETER AORTIC VALVE IMPLANTATION (TAVI): Primary | ICD-10-CM

## 2024-04-09 DIAGNOSIS — I48.91 ATRIAL FIBRILLATION, UNSPECIFIED TYPE (H): ICD-10-CM

## 2024-04-09 DIAGNOSIS — Q23.81 BICUSPID AORTIC VALVE: Primary | ICD-10-CM

## 2024-04-09 LAB — INR BLD: 2.6 (ref 0.9–1.1)

## 2024-04-09 PROCEDURE — 85610 PROTHROMBIN TIME: CPT

## 2024-04-09 PROCEDURE — 36416 COLLJ CAPILLARY BLOOD SPEC: CPT

## 2024-04-09 NOTE — PROGRESS NOTES
ANTICOAGULATION MANAGEMENT     Wiley Storey 73 year old male is on warfarin with therapeutic INR result. (Goal INR 2.0-3.0)    Recent labs: (last 7 days)     04/09/24  1434   INR 2.6*       ASSESSMENT     Source(s): Chart Review and Patient/Caregiver Call     Warfarin doses taken: Warfarin taken as instructed  Diet: No new diet changes identified  Medication/supplement changes: None noted  New illness, injury, or hospitalization: Yes: 4/8 ER visit for bloody stools, 2 episodes of melena, positive hemoccult, negative CT abd for active bleeding, hgb stable at 15.5  Signs or symptoms of bleeding or clotting: Yes: 4/8 positive hemoccult   Previous result: Therapeutic last 2(+) visits  Additional findings:  Seen by [facility] cardiology today and plan is to reduce INR range to 2.0-2.5 for the remainder of the 3 weeks on warfarin.  Stop date of Warfarin after LASHAE is 5/1/24 then life long aspirin 81mg once warfarin is stopped.    [facility] Note: Given positive Hemoccult, we discussed potentially discontinuing his warfarin a few weeks early and transitioning to a baby aspirin while pursuing EGD/colonoscopy. We also discussed continuing warfarin while lowering the INR goal to 2.0-2.5 with close monitoring of his stools. Ultimately we decided on the latter plan and he will notify his anticoagulation clinic. Should he notice increasing amounts of dark stools and have an acute drop in his hemoglobin then I would recommend discontinuing warfarin and pursuing GI bleed workup. His CT scan and hemoglobin are quite reassuring.  From [facility]       PLAN     Recommended plan for ongoing change(s) affecting INR     Dosing Instructions: decrease your warfarin dose (5.9% change) with next INR in 6 days       Summary  As of 4/9/2024      Full warfarin instructions:  6 mg every Mon, Fri; 4 mg all other days   Next INR check:  4/15/2024               Telephone call with Wiley who verbalizes understanding and agrees to plan.  Patient will monitor  his stools closely and I told him to contact his doctor right away if he is having increased amount of dark stools.      Check at provider office visit 4/15    Education provided:   Goal range and lab monitoring: goal range and significance of current result and Importance of following up at instructed interval  Symptom monitoring: monitoring for bleeding signs and symptoms, monitoring for clotting signs and symptoms, monitoring for stroke signs and symptoms, and when to seek medical attention/emergency care  Contact [phone] with any changes, questions or concerns.     Plan made with [facility] Pharmacist Dot Land, RN  Anticoagulation Clinic  4/9/2024    _______________________________________________________________________     Anticoagulation Episode Summary       Current INR goal:  2.0-3.0   TTR:  92.7% (1.8 mo)   Target end date:  5/2/2024   Send INR reminders to:  MIGUELINA SIMPSON    Indications    Bicuspid aortic valve [Q23.1]  History of coronary artery bypass surgery [Z95.1]  Thrombocytopenia (H24) [D69.6]  Atrial fibrillation  unspecified type (H) [I48.91]  History of transcatheter aortic valve implantation (LASHAE) [Z95.2]             Comments:               Anticoagulation Care Providers       Provider Role Specialty Phone number    Stew Saravia MD Referring Family Medicine [phone]

## 2024-04-09 NOTE — PROGRESS NOTES
ANTICOAGULATION CLINIC REFERRAL RENEWAL REQUEST       An annual renewal order is required for all patients referred to [facility].?  Please review and sign the pended referral order for Wiley Storey.       ANTICOAGULATION SUMMARY      Warfarin indication(s)   Mechanical AVR    Mechanical heart valve present?    2/1/24 26mm Harrison Conner 3 Ultra pericardial aortic valve prosthesis        Current goal range   INR: 2.0-2.5     Goal appropriate for indication? Request to decrease range from 2-3 down to 2.0-2.5 per [facility]      Time in Therapeutic Range (TTR)  (Goal > 60%) 92.7%       Office visit with referring provider's group within last year yes on 2/19/24       Linda Land, RN  [facility]

## 2024-04-15 ENCOUNTER — OFFICE VISIT (OUTPATIENT)
Dept: FAMILY MEDICINE | Facility: CLINIC | Age: 74
End: 2024-04-15
Payer: COMMERCIAL

## 2024-04-15 ENCOUNTER — HOSPITAL ENCOUNTER (OUTPATIENT)
Facility: HOSPITAL | Age: 74
Setting detail: OBSERVATION
Discharge: HOME | End: 2024-04-17
Attending: FAMILY MEDICINE | Admitting: INTERNAL MEDICINE
Payer: COMMERCIAL

## 2024-04-15 ENCOUNTER — APPOINTMENT (OUTPATIENT)
Dept: CT IMAGING | Facility: HOSPITAL | Age: 74
End: 2024-04-15
Attending: FAMILY MEDICINE
Payer: COMMERCIAL

## 2024-04-15 ENCOUNTER — TELEPHONE (OUTPATIENT)
Dept: FAMILY MEDICINE | Facility: CLINIC | Age: 74
End: 2024-04-15

## 2024-04-15 VITALS
HEART RATE: 66 BPM | OXYGEN SATURATION: 100 % | DIASTOLIC BLOOD PRESSURE: 81 MMHG | SYSTOLIC BLOOD PRESSURE: 116 MMHG | TEMPERATURE: 97.4 F | RESPIRATION RATE: 20 BRPM | WEIGHT: 158.12 LBS | BODY MASS INDEX: 24.82 KG/M2 | HEIGHT: 67 IN

## 2024-04-15 DIAGNOSIS — Z79.01 ANTICOAGULATED ON WARFARIN: Primary | ICD-10-CM

## 2024-04-15 DIAGNOSIS — K92.2 GASTROINTESTINAL HEMORRHAGE, UNSPECIFIED GASTROINTESTINAL HEMORRHAGE TYPE: Primary | ICD-10-CM

## 2024-04-15 DIAGNOSIS — Z95.2 S/P TAVR (TRANSCATHETER AORTIC VALVE REPLACEMENT): ICD-10-CM

## 2024-04-15 DIAGNOSIS — K27.9 PEPTIC ULCER WITHOUT HEMORRHAGE OR PERFORATION: ICD-10-CM

## 2024-04-15 DIAGNOSIS — Z95.2 HISTORY OF TRANSCATHETER AORTIC VALVE IMPLANTATION (TAVI): ICD-10-CM

## 2024-04-15 DIAGNOSIS — K92.1 GASTROINTESTINAL HEMORRHAGE WITH MELENA: ICD-10-CM

## 2024-04-15 DIAGNOSIS — I48.91 ATRIAL FIBRILLATION, UNSPECIFIED TYPE (H): ICD-10-CM

## 2024-04-15 LAB
ABO/RH(D): NORMAL
ANION GAP SERPL CALCULATED.3IONS-SCNC: 9 MMOL/L (ref 7–15)
ANTIBODY SCREEN: NEGATIVE
BASOPHILS # BLD AUTO: 0 10E3/UL (ref 0–0.2)
BASOPHILS NFR BLD AUTO: 1 %
BUN SERPL-MCNC: 33.6 MG/DL (ref 8–23)
CALCIUM SERPL-MCNC: 8.6 MG/DL (ref 8.8–10.2)
CHLORIDE SERPL-SCNC: 108 MMOL/L (ref 98–107)
CREAT SERPL-MCNC: 1.09 MG/DL (ref 0.67–1.17)
DEPRECATED HCO3 PLAS-SCNC: 23 MMOL/L (ref 22–29)
EGFRCR SERPLBLD CKD-EPI 2021: 71 ML/MIN/1.73M2
EOSINOPHIL # BLD AUTO: 0.1 10E3/UL (ref 0–0.7)
EOSINOPHIL NFR BLD AUTO: 2 %
ERYTHROCYTE [DISTWIDTH] IN BLOOD BY AUTOMATED COUNT: 14.1 % (ref 10–15)
GLUCOSE SERPL-MCNC: 103 MG/DL (ref 70–99)
HCT VFR BLD AUTO: 31.1 % (ref 40–53)
HGB BLD-MCNC: 10.2 G/DL (ref 13.3–17.7)
HGB BLD-MCNC: 10.7 G/DL (ref 13.3–17.7)
HGB BLD-MCNC: 9.3 G/DL (ref 13.3–17.7)
IMM GRANULOCYTES # BLD: 0 10E3/UL
IMM GRANULOCYTES NFR BLD: 0 %
INR BLD: 2.4 (ref 0.9–1.1)
INR PPP: 2.76 (ref 0.85–1.15)
LYMPHOCYTES # BLD AUTO: 1 10E3/UL (ref 0.8–5.3)
LYMPHOCYTES NFR BLD AUTO: 21 %
MCH RBC QN AUTO: 33.2 PG (ref 26.5–33)
MCHC RBC AUTO-ENTMCNC: 34.4 G/DL (ref 31.5–36.5)
MCV RBC AUTO: 97 FL (ref 78–100)
MONOCYTES # BLD AUTO: 0.5 10E3/UL (ref 0–1.3)
MONOCYTES NFR BLD AUTO: 10 %
NEUTROPHILS # BLD AUTO: 3.2 10E3/UL (ref 1.6–8.3)
NEUTROPHILS NFR BLD AUTO: 66 %
PLATELET # BLD AUTO: 135 10E3/UL (ref 150–450)
POTASSIUM SERPL-SCNC: 4.4 MMOL/L (ref 3.4–5.3)
RBC # BLD AUTO: 3.22 10E6/UL (ref 4.4–5.9)
SODIUM SERPL-SCNC: 140 MMOL/L (ref 135–145)
SPECIMEN EXPIRATION DATE: NORMAL
WBC # BLD AUTO: 4.8 10E3/UL (ref 4–11)

## 2024-04-15 PROCEDURE — 99292 CRITICAL CARE ADDL 30 MIN: CPT

## 2024-04-15 PROCEDURE — 85610 PROTHROMBIN TIME: CPT | Performed by: FAMILY MEDICINE

## 2024-04-15 PROCEDURE — 85025 COMPLETE CBC W/AUTO DIFF WBC: CPT | Performed by: FAMILY MEDICINE

## 2024-04-15 PROCEDURE — 96365 THER/PROPH/DIAG IV INF INIT: CPT | Mod: 59

## 2024-04-15 PROCEDURE — 99222 1ST HOSP IP/OBS MODERATE 55: CPT | Performed by: HOSPITALIST

## 2024-04-15 PROCEDURE — 250N000011 HC RX IP 250 OP 636: Performed by: FAMILY MEDICINE

## 2024-04-15 PROCEDURE — 36416 COLLJ CAPILLARY BLOOD SPEC: CPT | Performed by: FAMILY MEDICINE

## 2024-04-15 PROCEDURE — 85018 HEMOGLOBIN: CPT | Performed by: FAMILY MEDICINE

## 2024-04-15 PROCEDURE — 86900 BLOOD TYPING SEROLOGIC ABO: CPT | Performed by: FAMILY MEDICINE

## 2024-04-15 PROCEDURE — 99291 CRITICAL CARE FIRST HOUR: CPT

## 2024-04-15 PROCEDURE — 258N000003 HC RX IP 258 OP 636: Performed by: FAMILY MEDICINE

## 2024-04-15 PROCEDURE — 80048 BASIC METABOLIC PNL TOTAL CA: CPT | Performed by: FAMILY MEDICINE

## 2024-04-15 PROCEDURE — 82248 BILIRUBIN DIRECT: CPT

## 2024-04-15 PROCEDURE — C9113 INJ PANTOPRAZOLE SODIUM, VIA: HCPCS | Performed by: FAMILY MEDICINE

## 2024-04-15 PROCEDURE — 36415 COLL VENOUS BLD VENIPUNCTURE: CPT | Performed by: FAMILY MEDICINE

## 2024-04-15 PROCEDURE — 99207 PR INPT ADMISSION FROM CLINIC: CPT | Performed by: FAMILY MEDICINE

## 2024-04-15 PROCEDURE — 96375 TX/PRO/DX INJ NEW DRUG ADDON: CPT | Mod: 59

## 2024-04-15 PROCEDURE — G0378 HOSPITAL OBSERVATION PER HR: HCPCS

## 2024-04-15 PROCEDURE — 74174 CTA ABD&PLVS W/CONTRAST: CPT

## 2024-04-15 RX ORDER — EZETIMIBE 10 MG/1
10 TABLET ORAL DAILY
Status: DISCONTINUED | OUTPATIENT
Start: 2024-04-16 | End: 2024-04-17 | Stop reason: HOSPADM

## 2024-04-15 RX ORDER — IOPAMIDOL 755 MG/ML
90 INJECTION, SOLUTION INTRAVASCULAR ONCE
Status: COMPLETED | OUTPATIENT
Start: 2024-04-15 | End: 2024-04-15

## 2024-04-15 RX ADMIN — PHYTONADIONE 2 MG: 10 INJECTION, EMULSION INTRAMUSCULAR; INTRAVENOUS; SUBCUTANEOUS at 23:11

## 2024-04-15 RX ADMIN — IOPAMIDOL 90 ML: 755 INJECTION, SOLUTION INTRAVENOUS at 20:32

## 2024-04-15 RX ADMIN — PANTOPRAZOLE SODIUM 40 MG: 40 INJECTION, POWDER, FOR SOLUTION INTRAVENOUS at 19:28

## 2024-04-15 ASSESSMENT — ENCOUNTER SYMPTOMS
DIARRHEA: 0
ABDOMINAL PAIN: 0
SHORTNESS OF BREATH: 0
VOMITING: 0
ROS GI COMMENTS: POSITIVE FOR MELENA.

## 2024-04-15 ASSESSMENT — ACTIVITIES OF DAILY LIVING (ADL)
ADLS_ACUITY_SCORE: 35

## 2024-04-15 ASSESSMENT — COLUMBIA-SUICIDE SEVERITY RATING SCALE - C-SSRS
6. HAVE YOU EVER DONE ANYTHING, STARTED TO DO ANYTHING, OR PREPARED TO DO ANYTHING TO END YOUR LIFE?: NO
2. HAVE YOU ACTUALLY HAD ANY THOUGHTS OF KILLING YOURSELF IN THE PAST MONTH?: NO
1. IN THE PAST MONTH, HAVE YOU WISHED YOU WERE DEAD OR WISHED YOU COULD GO TO SLEEP AND NOT WAKE UP?: NO

## 2024-04-15 NOTE — TELEPHONE ENCOUNTER
I saw patient for GI bleed last week and sent him to ER - he was admitted.     After our visit, I noted mention of incidental findings on CT abd/pelvis from 4/8 at [facility] [address], but did not discuss with patient at visit.    Patient may need additional follow up imaging tests based on incidental findings on CT at [address].    CT 4/8/24 [facility] everywhere:   1.  Negative for active gastrointestinal hemorrhage at the time of this exam. Colonic diverticulosis could be a source of intermittent bleeding.   2.  Stable 1.7 centimeter indeterminate cystic lesion in the left kidney. Recommend multiphase renal CT or MRI for further evaluation or comparison with outside imaging.   3.  Stable 5 mm cystic lesion in the inferior pancreatic head which could represent a side branch IPMN.     Can you follow up with patient regarding these findings at your 4/25 visit? These findings were not mentioned in the [facility] 4/15 CT. There is mention in the 1/3/24 [address] [address] imaging of pancreatic follow up guidelines. Patient prefers to do his specialty care at [address], so it is possible they have already arranged follow up imaging - please check with patient.

## 2024-04-15 NOTE — ED PROVIDER NOTES
EMERGENCY DEPARTMENT ENCOUNTER      NAME: Wiley Storey  AGE: 73 year old male  YOB: 1950  MRN: [id number]  EVALUATION DATE & TIME: 4/15/2024  6:25 PM    PCP: Stew Saravia    ED PROVIDER: Chevy Cespedes M.D.    Chief Complaint   Patient presents with    Rectal Bleeding       FINAL IMPRESSION:  1. Gastrointestinal hemorrhage with melena    2. Anticoagulated on warfarin    3. S/P TAVR (transcatheter aortic valve replacement)        ED COURSE & MEDICAL DECISION MAKING:    Pertinent Labs & Imaging studies independently interpreted by me. (See chart for details)  Reviewed note from clinic today, patient with melena for a week, INR was 2.4 with hemoglobin of 10.7.  Also reviewed prior cardiology follow-up visit at [facility] from April 9, this is follow-up TAVR, TTE April 8 was normal, at that time he did have a positive hemoglobin but an avid CT scan of the abdomen for active bleeding.    ED Course as of 04/15/24 2213   Mon Apr 15, 2024   1845 Patient seen and examined, presents with dark stools for a week and drop in hemoglobin during that time as well.  Patient has no complaints other than feeling gassy.  On exam here vitally stable.  INR done in clinic today was 2.6, hemoglobin 10.7.  Will recheck hemoglobin and make sure it is not dropping acutely, CTA of the abdomen and pelvis will be performed as well.  If this is negative, consider discontinuing Coumadin, vitamin K, and follow-up hemoglobin in a couple of days.   1934 Labs independently interpreted by me with hemoglobin 10.2, basic panel reassuring.   2047 CTA abdomen and pelvis independently interpreted by me not demonstrate acute hemorrhage, inflammation, or intra-abdominal abnormality.  Patient remains vitally stable, repeat hemoglobin will be ordered and if this is stable, patient can be discharged.   2125 Repeat hemoglobin is 9.3, patient will be admitted for further evaluation and treatment.  Will discuss stopping anticoagulant with [facility]  although likely this would be indicated given active GI bleed.  Blood transfusion consent signed.  Contacted [place], will discuss with GI and hospitalist as well   2144 Discussed with Dr. Ott, hospitalist for admission.   2149 Care discussed with CHET Chicas who recommends vitamin K and plan to scope tomorrow.  Waiting for callback from [place] cardiology.   2212 Care discussed with [place] who recommends starting aspirin 81mg, off warfarin and reverse Coumadin if indicated.  We discussed that with continued drop in hemoglobin, vitamin K would be given here.         At the conclusion of the encounter I discussed the results of all of the tests and the disposition. The questions were answered. The patient or family acknowledged understanding and was agreeable with the care plan.     Medical Decision Making  Obtained supplemental history:Supplemental history obtained?: No  Reviewed external records: External records reviewed?: Documented in chart  Care impacted by chronic illness:Chronic Kidney Disease and Heart Disease  Care significantly affected by social determinants of health:N/A  Did you consider but not order tests?: Work up considered but not performed and documented in chart, if applicable  Did you interpret images independently?: Independent interpretation of ECG and images noted in documentation, when applicable.  Consultation discussion with other provider:Did you involve another provider (consultant, , pharmacy, etc.)?: I discussed the care with another health care provider, see documentation for details.  Admit.    PROCEDURES:     Critical Care     Performed by: Dr Chevy Cespedes  Total critical care time: 130 minutes  Critical care was necessary to treat or prevent imminent or life-threatening deterioration of the following conditions: GI bleed, anticoagulated on Coumadin with reversal, admission with plan for EGD  Critical care was time spent personally by me on the following activities: development  of treatment plan with patient or surrogate, discussions with consultants, examination of patient, evaluation of patient's response to treatment, obtaining history from patient or surrogate, ordering and performing treatments and interventions, ordering and review of laboratory studies, ordering and review of radiographic studies, re-evaluation of patient's condition and monitoring for potential decompensation.  Critical care time was exclusive of separately billable procedures and treating other patients.      MEDICATIONS GIVEN IN THE EMERGENCY:  Medications   ezetimibe (ZETIA) tablet 10 mg (has no administration in time range)   HOLD: warfarin (COUMADIN) therapy (has no administration in time range)   phytonadione 2 mg in sodium chloride 0.9 % 50 mL intermittent infusion (has no administration in time range)   pantoprazole (PROTONIX) IV push injection 40 mg (40 mg Intravenous $Given 4/15/24 1928)   iopamidol (ISOVUE-370) solution 90 mL (90 mLs Intravenous $Given 4/15/24 2032)       NEW PRESCRIPTIONS STARTED AT TODAY'S ER VISIT  New Prescriptions    No medications on file       =================================================================    HPI    Patient information was obtained from: Patient       Wiley Storey is a 73 year old male with a pertinent history of CKD2, CHF, CAD, atrial fibrillation who presents to this ED by private vehicle for evaluation of melena.    The patient is on Coumadin for 3 months s/p TAVR on 2/1/24. He endorses one week of melena but denies abdominal pain, vomiting, diarrhea, shortness of breath or chest pain. No history of diabetes or HTN.     He was seen at the [facility] earlier today. His INR was 2.4 and his hemoglobin was 10.7. Patient's hemoglobin last week was 15.5. Patient has no other complaints at this time.       REVIEW OF SYSTEMS   Review of Systems   Respiratory:  Negative for shortness of breath.    Cardiovascular:  Negative for chest pain.    Gastrointestinal:  Negative for abdominal pain, diarrhea and vomiting.        Positive for melena.   All other systems reviewed and are negative.     All other systems reviewed and negative    PAST MEDICAL HISTORY:  Past Medical History:   Diagnosis Date    Atrial fibrillation (H)     Maze 3/2012    CAD (coronary artery disease)     s/p CABG 3/2012-  LIMA to LAD and RSVGs to OM, D, and RPDA    GERD (gastroesophageal reflux disease)     GI bleed 1991    History of blood transfusion     Hyperlipidemia     Hypertension     NSVT (nonsustained ventricular tachycardia) (H)     Sleep apnea     uses CPAP    Sleep apnea     cpap    Thyroid disease     hyperthyroid       PAST SURGICAL HISTORY:  Past Surgical History:   Procedure Laterality Date    ANESTHESIA CARDIOVERSION  07/02/2012    Procedure: ANESTHESIA CARDIOVERSION;  Anesthesia Off site Cardioversion;  Surgeon: Provider, Generic Anesthesia;  Location: [facility] OR    BYPASS GRAFT ARTERY CORONARY  03/08/2012    Procedure:BYPASS GRAFT ARTERY CORONARY; Median Sternotomy, Coronary Artery Bypass Graft X4 Using the Left Internal Mamary and Left Saphenous Vein with MAZE Procedure, and On Pump Oxygenator.; Surgeon:MICHOACANO HOANG; Location:[facility] OR    COLONOSCOPY N/A 08/16/2016    Procedure: COLONOSCOPY;  Surgeon: Brad Peralta MD;  Location:  GI    COLONOSCOPY N/A 11/10/2016    Procedure: COLONOSCOPY;  Surgeon: Brad Peralta MD;  Location:  GI    MAZE PROCEDURE  03/08/2012    Procedure:MAZE PROCEDURE; Surgeon:MICHOACANO HOANG; Location:[facility] OR    ulcer operation  01/01/1991    [facility] TRANSCATHETER AORTIC VALVE REPLACE (TAVR/LASHAE), W/PROSTH VALVE; TRANSCAVAL APPR  02/01/2024    See [place] Admission from 02/01/2024       CURRENT MEDICATIONS:    Current Facility-Administered Medications   Medication Dose Route Frequency Provider Last Rate Last Admin    [START ON 4/16/2024] ezetimibe (ZETIA) tablet 10 mg  10 mg Oral Daily Reese Ott MD        HOLD: warfarin (COUMADIN)  therapy   Does not apply HOLD Chevy Cespedes MD        phytonadione 2 mg in sodium chloride 0.9 % 50 mL intermittent infusion  2 mg Intravenous Once Chevy Cespedes MD         Current Outpatient Medications   Medication Sig Dispense Refill    amoxicillin (AMOXIL) 500 MG capsule Take 4 caps (2000 mg) 1 hour prior to dental procedure.      Cholecalciferol (VITAMIN D PO) Take 3,000 Units by mouth daily 1 tab daily      CRANBERRY EXTRACT PO Take 2 tablets by mouth daily      ezetimibe (ZETIA) 10 MG tablet Take 1 tablet (10 mg) by mouth daily 90 tablet 1    Multiple Vitamins-Minerals (MULTIVITAL PO) Take by mouth daily 1 TABLET DAILY      Probiotic Product (PROBIOTIC GUMMIES PO) Take 1 tablet by mouth 2 times daily      saw palmetto 450 MG CAPS capsule Take 900 mg by mouth daily      vitamin B complex with vitamin C (VITAMIN  B COMPLEX) tablet Take 1 tablet by mouth daily      vitamin B-12 (CYANOCOBALAMIN) 1000 MCG tablet Take 1,000 mcg by mouth three times a week      vitamin C (ASCORBIC ACID) 1000 MG TABS Take 500 mg by mouth daily.      warfarin ANTICOAGULANT (COUMADIN) 2 MG tablet 6mg on Thursdays and 4mg all other days or as directed by INR clinic 190 tablet 0    Blood Pressure Monitoring (B-D ASSURE BPM/AUTO ARM CUFF) MISC 1 Device daily 1 Device 0    ORDER FOR DME Use your BiPAP device as directed by your provider.         ALLERGIES:  Allergies   Allergen Reactions    Aspirin      Early 1990s, took one dose of aspirin + ibuprofen and had severe GI bleed - hospitalization required    Ibuprofen      Early 1990s, took one dose of aspirin + ibuprofen and had severe GI bleed -hospitalization required       FAMILY HISTORY:  Family History   Problem Relation Age of Onset    Cardiovascular Unknown         aortic aneurysm, CAD    Cancer Mother 86        pancreatic    C.A.D. Mother         passed away at 86    C.A.D. Brother         stent    Diabetes Maternal Grandmother     Hypertension Brother        SOCIAL  HISTORY:   Social History     Socioeconomic History    Marital status:      Spouse name: None    Number of children: None    Years of education: None    Highest education level: None   Tobacco Use    Smoking status: Former     Current packs/day: 0.00     Types: Cigarettes     Quit date: 1983     Years since quittin.3     Passive exposure: Never    Smokeless tobacco: Never   Vaping Use    Vaping status: Never Used   Substance and Sexual Activity    Alcohol use: Yes     Comment: occ wine    Drug use: No    Sexual activity: Yes     Partners: Female   Other Topics Concern    Parent/sibling w/ CABG, MI or angioplasty before 65F 55M? No     Social Determinants of Health     Financial Resource Strain: Low Risk  (2024)    Financial Resource Strain     Within the past 12 months, have you or your family members you live with been unable to get utilities (heat, electricity) when it was really needed?: No   Food Insecurity: Low Risk  (2024)    Food Insecurity     Within the past 12 months, did you worry that your food would run out before you got money to buy more?: No     Within the past 12 months, did the food you bought just not last and you didn t have money to get more?: No   Transportation Needs: Low Risk  (2024)    Transportation Needs     Within the past 12 months, has lack of transportation kept you from medical appointments, getting your medicines, non-medical meetings or appointments, work, or from getting things that you need?: No   Interpersonal Safety: Low Risk  (4/15/2024)    Interpersonal Safety     Do you feel physically and emotionally safe where you currently live?: Yes     Within the past 12 months, have you been hit, slapped, kicked or otherwise physically hurt by someone?: No     Within the past 12 months, have you been humiliated or emotionally abused in other ways by your partner or ex-partner?: No   Housing Stability: Low Risk  (2024)    Housing Stability     Do you have  housing? : Yes     Are you worried about losing your housing?: No       VITALS:  BP (!) 152/79   Pulse 69   Temp 98.1  F (36.7  C) (Oral)   Resp 18   Wt 69.9 kg (154 lb)   SpO2 99%   BMI 24.43 kg/m      PHYSICAL EXAM:  Physical Exam  Vitals and nursing note reviewed.   Constitutional:       Appearance: Normal appearance.   HENT:      Head: Normocephalic and atraumatic.      Right Ear: External ear normal.      Left Ear: External ear normal.      Nose: Nose normal.      Mouth/Throat:      Mouth: Mucous membranes are moist.   Eyes:      Extraocular Movements: Extraocular movements intact.      Conjunctiva/sclera: Conjunctivae normal.      Pupils: Pupils are equal, round, and reactive to light.   Cardiovascular:      Rate and Rhythm: Normal rate and regular rhythm.      Heart sounds: Murmur heard.   Pulmonary:      Effort: Pulmonary effort is normal.      Breath sounds: Normal breath sounds. No wheezing or rales.   Abdominal:      General: Abdomen is flat. There is no distension.      Palpations: Abdomen is soft.      Tenderness: There is no abdominal tenderness. There is no guarding.   Musculoskeletal:         General: Normal range of motion.      Cervical back: Normal range of motion and neck supple.      Right lower leg: No edema.      Left lower leg: No edema.   Lymphadenopathy:      Cervical: No cervical adenopathy.   Skin:     General: Skin is warm and dry.   Neurological:      General: No focal deficit present.      Mental Status: He is alert and oriented to person, place, and time. Mental status is at baseline.      Comments: No gross focal neurologic deficits   Psychiatric:         Mood and Affect: Mood normal.         Behavior: Behavior normal.         Thought Content: Thought content normal.          LAB:  All pertinent labs reviewed and interpreted.  Results for orders placed or performed during the hospital encounter of 04/15/24   CTA Abdomen Pelvis with Contrast    Impression    IMPRESSION:  1.  No  acute abnormality in the abdomen or pelvis. Specifically, no evidence of active, intraluminal gastrointestinal hemorrhage.  2.  Moderate distention of the urinary bladder, correlate for urinary retention/outlet obstruction. No hydronephrosis.   Result Value Ref Range    Hemoglobin 10.2 (L) 13.3 - 17.7 g/dL   Basic metabolic panel   Result Value Ref Range    Sodium 140 135 - 145 mmol/L    Potassium 4.4 3.4 - 5.3 mmol/L    Chloride 108 (H) 98 - 107 mmol/L    Carbon Dioxide (CO2) 23 22 - 29 mmol/L    Anion Gap 9 7 - 15 mmol/L    Urea Nitrogen 33.6 (H) 8.0 - 23.0 mg/dL    Creatinine 1.09 0.67 - 1.17 mg/dL    GFR Estimate 71 >60 mL/min/1.73m2    Calcium 8.6 (L) 8.8 - 10.2 mg/dL    Glucose 103 (H) 70 - 99 mg/dL   Result Value Ref Range    Hemoglobin 9.3 (L) 13.3 - 17.7 g/dL   Adult Type and Screen   Result Value Ref Range    ABO/RH(D) O POS     Antibody Screen Negative Negative    SPECIMEN EXPIRATION DATE [id number]        RADIOLOGY:  Reviewed all pertinent imaging. Please see official radiology report.  CTA Abdomen Pelvis with Contrast   Final Result   IMPRESSION:   1.  No acute abnormality in the abdomen or pelvis. Specifically, no evidence of active, intraluminal gastrointestinal hemorrhage.   2.  Moderate distention of the urinary bladder, correlate for urinary retention/outlet obstruction. No hydronephrosis.          I, Enrrique Lima, am serving as a scribe to document services personally performed by Dr. Cespedes based on my observation and the provider's statements to me. I, Chevy Cespedes MD attest that Enrrique Lima is acting in a scribe capacity, has observed my performance of the services and has documented them in accordance with my direction.    Chevy Cespedes M.D.  Emergency Medicine  [facility] EMERGENCY DEPARTMENT  [facility] [facility] [id number] [phone]  Dept: [phone]       Chevy Cespedes MD  04/15/24  [id number]

## 2024-04-15 NOTE — PROGRESS NOTES
"  Assessment & Plan     Gastrointestinal hemorrhage  Warfarin s/p TAVR  INR 2.4 today. Melena twice daily for one week, hgb today down 5 points from one week ago (15.5-->10.7). Denies CP, SOB, lightheadedness. Recommend ER for likely admission and GI consult. Patient agrees to go to [facility] ER now. ER notified. Patient will go by private transport, but if any new lightheadedness or if he has a large dark stool or BRBPR, he was advised call 911 for transport.   - CBC with platelets and differential    History of transcatheter aortic valve implantation (LASHAE)  - INR point of care            MED REC REQUIRED  Post Medication Reconciliation Status:  Unable to reconcile discharge medications  BMI  Estimated body mass index is 25.08 kg/m  as calculated from the following:    Height as of this encounter: 1.691 m (5' 6.58\").    Weight as of this encounter: 71.7 kg (158 lb 1.9 oz).           INR 2.4      Elaine Jama is a 73 year old, presenting for the following health issues:  Hospital F/U        4/15/2024     2:50 PM   Additional Questions   Roomed by isa gutierrez MA   Accompanied by self     HPI     Here for GI bleeding    [facility] 4/8/24 - was at [place] for 2 days last week for scheduled cardiology follow up after TAVR 2/1/24 - and ended up going to ER for blood in stool 4/8/24. Patient had normal hgb 15.5 on 4/8, no active bleed by CT-A abd/pelvis. Diverticulosis on CT.  His warfarin dosing was reduced to lower goal INR range, and he was scheduled to follow up here today for hgb.     Patient has continued to have black and dark brown stools 2/day since ER visit.    Gassy. Occasional abd pain - feels like gassiness per patient. Lots of gas, different/bad odor.     Per patient, has two weeks of warfarin therapy left. At cardiology visit 4/8, they discussed the option of stopping it early, but he wanted to try to continue.     GI bleed 1991 from ASA and ibuprofen  Last colon cancer screen 2 years ago - " "cologuard    CT-A abd/pelvis 4/8 [facility]-   1.  Negative for active gastrointestinal hemorrhage at the time of this exam. Colonic diverticulosis could be a source of intermittent bleeding.   2.  Stable 1.7 centimeter indeterminate cystic lesion in the left kidney. Recommend multiphase renal CT or MRI for further evaluation or comparison with outside imaging.   3.  Stable 5 mm cystic lesion in the inferior pancreatic head which could represent a side branch IPMN.     Forgot to take warfarin thurs night, so took 2 Friday        Hospital Follow-up Visit:    Hospital/Nursing Home/IP Rehab Facility:  [facility] emergency department  Date of Admission: 4/8/24  Date of Discharge: 4/8/24  Reason(s) for Admission: Melena  Was the patient in the ICU or did the patient experience delirium during hospitalization?  No  Do you have any other stressors you would like to discuss with your provider? No    Problems taking medications regularly:  None  Medication changes since discharge: None  Problems adhering to non-medication therapy:  None    Summary of hospitalization:  CareEverywhere information obtained and reviewed  Diagnostic Tests/Treatments reviewed.  Follow up needed: hgb  Other Healthcare Providers Involved in Patient s Care:         Specialist appointment - cardiology  Update since discharge: unchanged - black stools continue         Plan of care communicated with patient                       Objective    /81   Pulse 66   Temp 97.4  F (36.3  C) (Oral)   Resp 20   Ht 1.691 m (5' 6.58\")   Wt 71.7 kg (158 lb 1.9 oz)   SpO2 100%   BMI 25.08 kg/m    Body mass index is 25.08 kg/m .  Physical Exam               Signed Electronically by: Julia Do MD    "

## 2024-04-15 NOTE — ED NOTES
Expected Patient Referral to ED  4:40 PM    Referring Clinic/Provider:  [facility]    Reason for referral/Clinical facts:  Recent TARV, now having GI bleed.  On coumadin.  Hgb at 10, down from 15.      Recommendations provided:  Send to ED for further evaluation    Caller was informed that this institution does possess the capabilities and/or resources to provide for patient and should be transferred to our facility.    Discussed that if direct admit is sought and any hurdles are encountered, this ED would be happy to see the patient and evaluate.    Informed caller that recommendations provided are recommendations based only on the facts provided and that they responsible to accept or reject the advice, or to seek a formal in person consultation as needed and that this ED will see/treat patient should they arrive.      Manuel Reese DO  [facility] EMERGENCY DEPARTMENT  [address] [place] [id number]  [phone]       Manuel Reese DO  04/15/24 1648

## 2024-04-15 NOTE — ED TRIAGE NOTES
Pt having bloody stools x1 week, was seen in clinic hgb 10.6 per pt. On coumadin s/p TAVR.      Triage Assessment (Adult)       Row Name 04/15/24 2899          Triage Assessment    Airway WDL WDL        Cardiac WDL    Cardiac WDL WDL

## 2024-04-16 ENCOUNTER — ANESTHESIA EVENT (OUTPATIENT)
Dept: SURGERY | Facility: HOSPITAL | Age: 74
End: 2024-04-16
Payer: COMMERCIAL

## 2024-04-16 ENCOUNTER — ANESTHESIA (OUTPATIENT)
Dept: SURGERY | Facility: HOSPITAL | Age: 74
End: 2024-04-16
Payer: COMMERCIAL

## 2024-04-16 ENCOUNTER — ANTICOAGULATION THERAPY VISIT (OUTPATIENT)
Dept: ANTICOAGULATION | Facility: CLINIC | Age: 74
End: 2024-04-16

## 2024-04-16 DIAGNOSIS — Z95.2 HISTORY OF TRANSCATHETER AORTIC VALVE IMPLANTATION (TAVI): ICD-10-CM

## 2024-04-16 DIAGNOSIS — Q23.81 BICUSPID AORTIC VALVE: Primary | ICD-10-CM

## 2024-04-16 DIAGNOSIS — Z95.1 HISTORY OF CORONARY ARTERY BYPASS SURGERY: ICD-10-CM

## 2024-04-16 DIAGNOSIS — D69.6 THROMBOCYTOPENIA (H): ICD-10-CM

## 2024-04-16 DIAGNOSIS — I48.91 ATRIAL FIBRILLATION, UNSPECIFIED TYPE (H): ICD-10-CM

## 2024-04-16 LAB
ALBUMIN SERPL BCG-MCNC: 3.6 G/DL (ref 3.5–5.2)
ALP SERPL-CCNC: 44 U/L (ref 40–150)
ALT SERPL W P-5'-P-CCNC: 20 U/L (ref 0–70)
ANION GAP SERPL CALCULATED.3IONS-SCNC: 8 MMOL/L (ref 7–15)
AST SERPL W P-5'-P-CCNC: 28 U/L (ref 0–45)
BILIRUB DIRECT SERPL-MCNC: <0.2 MG/DL (ref 0–0.3)
BILIRUB SERPL-MCNC: 0.3 MG/DL
BUN SERPL-MCNC: 29.5 MG/DL (ref 8–23)
CALCIUM SERPL-MCNC: 8.2 MG/DL (ref 8.8–10.2)
CHLORIDE SERPL-SCNC: 108 MMOL/L (ref 98–107)
CREAT SERPL-MCNC: 1.01 MG/DL (ref 0.67–1.17)
DEPRECATED HCO3 PLAS-SCNC: 24 MMOL/L (ref 22–29)
EGFRCR SERPLBLD CKD-EPI 2021: 78 ML/MIN/1.73M2
GLUCOSE BLDC GLUCOMTR-MCNC: 95 MG/DL (ref 70–99)
GLUCOSE SERPL-MCNC: 93 MG/DL (ref 70–99)
HGB BLD-MCNC: 9 G/DL (ref 13.3–17.7)
HGB BLD-MCNC: 9.2 G/DL (ref 13.3–17.7)
HOLD SPECIMEN: NORMAL
INR PPP: 1.7 (ref 0.85–1.15)
INR PPP: 1.97 (ref 0.85–1.15)
POTASSIUM SERPL-SCNC: 3.7 MMOL/L (ref 3.4–5.3)
PROT SERPL-MCNC: 5.3 G/DL (ref 6.4–8.3)
SODIUM SERPL-SCNC: 140 MMOL/L (ref 135–145)
UPPER GI ENDOSCOPY: NORMAL

## 2024-04-16 PROCEDURE — G0378 HOSPITAL OBSERVATION PER HR: HCPCS

## 2024-04-16 PROCEDURE — 360N000075 HC SURGERY LEVEL 2, PER MIN: Performed by: INTERNAL MEDICINE

## 2024-04-16 PROCEDURE — 85018 HEMOGLOBIN: CPT | Performed by: FAMILY MEDICINE

## 2024-04-16 PROCEDURE — 36415 COLL VENOUS BLD VENIPUNCTURE: CPT | Performed by: INTERNAL MEDICINE

## 2024-04-16 PROCEDURE — 258N000003 HC RX IP 258 OP 636: Performed by: NURSE ANESTHETIST, CERTIFIED REGISTERED

## 2024-04-16 PROCEDURE — 272N000001 HC OR GENERAL SUPPLY STERILE: Performed by: INTERNAL MEDICINE

## 2024-04-16 PROCEDURE — 370N000017 HC ANESTHESIA TECHNICAL FEE, PER MIN: Performed by: INTERNAL MEDICINE

## 2024-04-16 PROCEDURE — 258N000003 HC RX IP 258 OP 636: Performed by: HOSPITALIST

## 2024-04-16 PROCEDURE — 258N000003 HC RX IP 258 OP 636: Performed by: ANESTHESIOLOGY

## 2024-04-16 PROCEDURE — 82962 GLUCOSE BLOOD TEST: CPT

## 2024-04-16 PROCEDURE — 250N000009 HC RX 250: Performed by: NURSE ANESTHETIST, CERTIFIED REGISTERED

## 2024-04-16 PROCEDURE — 80048 BASIC METABOLIC PNL TOTAL CA: CPT | Performed by: FAMILY MEDICINE

## 2024-04-16 PROCEDURE — C9113 INJ PANTOPRAZOLE SODIUM, VIA: HCPCS | Performed by: HOSPITALIST

## 2024-04-16 PROCEDURE — 36415 COLL VENOUS BLD VENIPUNCTURE: CPT | Performed by: FAMILY MEDICINE

## 2024-04-16 PROCEDURE — 250N000011 HC RX IP 250 OP 636: Performed by: HOSPITALIST

## 2024-04-16 PROCEDURE — 999N000141 HC STATISTIC PRE-PROCEDURE NURSING ASSESSMENT: Performed by: INTERNAL MEDICINE

## 2024-04-16 PROCEDURE — 85018 HEMOGLOBIN: CPT | Performed by: INTERNAL MEDICINE

## 2024-04-16 PROCEDURE — 99232 SBSQ HOSP IP/OBS MODERATE 35: CPT | Mod: FS | Performed by: FAMILY MEDICINE

## 2024-04-16 PROCEDURE — 85610 PROTHROMBIN TIME: CPT | Performed by: FAMILY MEDICINE

## 2024-04-16 PROCEDURE — 250N000011 HC RX IP 250 OP 636: Performed by: NURSE ANESTHETIST, CERTIFIED REGISTERED

## 2024-04-16 PROCEDURE — 96376 TX/PRO/DX INJ SAME DRUG ADON: CPT | Mod: 59

## 2024-04-16 PROCEDURE — 999N000157 HC STATISTIC RCP TIME EA 10 MIN

## 2024-04-16 PROCEDURE — 88342 IMHCHEM/IMCYTCHM 1ST ANTB: CPT | Mod: TC | Performed by: INTERNAL MEDICINE

## 2024-04-16 PROCEDURE — 99207 PR APP CREDIT; MD BILLING SHARED VISIT: CPT

## 2024-04-16 RX ORDER — LIDOCAINE 40 MG/G
CREAM TOPICAL
Status: DISCONTINUED | OUTPATIENT
Start: 2024-04-16 | End: 2024-04-16 | Stop reason: HOSPADM

## 2024-04-16 RX ORDER — ACETAMINOPHEN 325 MG/1
650 TABLET ORAL EVERY 4 HOURS PRN
Status: DISCONTINUED | OUTPATIENT
Start: 2024-04-16 | End: 2024-04-17 | Stop reason: HOSPADM

## 2024-04-16 RX ORDER — SODIUM CHLORIDE, SODIUM LACTATE, POTASSIUM CHLORIDE, CALCIUM CHLORIDE 600; 310; 30; 20 MG/100ML; MG/100ML; MG/100ML; MG/100ML
INJECTION, SOLUTION INTRAVENOUS CONTINUOUS
Status: DISCONTINUED | OUTPATIENT
Start: 2024-04-16 | End: 2024-04-16 | Stop reason: HOSPADM

## 2024-04-16 RX ORDER — SODIUM CHLORIDE 9 MG/ML
INJECTION, SOLUTION INTRAVENOUS CONTINUOUS
Status: DISCONTINUED | OUTPATIENT
Start: 2024-04-16 | End: 2024-04-16

## 2024-04-16 RX ORDER — SODIUM CHLORIDE, SODIUM LACTATE, POTASSIUM CHLORIDE, CALCIUM CHLORIDE 600; 310; 30; 20 MG/100ML; MG/100ML; MG/100ML; MG/100ML
INJECTION, SOLUTION INTRAVENOUS CONTINUOUS
Status: CANCELLED | OUTPATIENT
Start: 2024-04-16

## 2024-04-16 RX ORDER — ONDANSETRON 2 MG/ML
4 INJECTION INTRAMUSCULAR; INTRAVENOUS EVERY 30 MIN PRN
Status: CANCELLED | OUTPATIENT
Start: 2024-04-16

## 2024-04-16 RX ORDER — NALOXONE HYDROCHLORIDE 0.4 MG/ML
0.4 INJECTION, SOLUTION INTRAMUSCULAR; INTRAVENOUS; SUBCUTANEOUS
Status: DISCONTINUED | OUTPATIENT
Start: 2024-04-16 | End: 2024-04-17 | Stop reason: HOSPADM

## 2024-04-16 RX ORDER — HALOPERIDOL 5 MG/ML
1 INJECTION INTRAMUSCULAR
Status: CANCELLED | OUTPATIENT
Start: 2024-04-16

## 2024-04-16 RX ORDER — ONDANSETRON 2 MG/ML
INJECTION INTRAMUSCULAR; INTRAVENOUS PRN
Status: DISCONTINUED | OUTPATIENT
Start: 2024-04-16 | End: 2024-04-16

## 2024-04-16 RX ORDER — LIDOCAINE HYDROCHLORIDE 10 MG/ML
INJECTION, SOLUTION INFILTRATION; PERINEURAL PRN
Status: DISCONTINUED | OUTPATIENT
Start: 2024-04-16 | End: 2024-04-16

## 2024-04-16 RX ORDER — ACETAMINOPHEN 650 MG/1
650 SUPPOSITORY RECTAL EVERY 4 HOURS PRN
Status: DISCONTINUED | OUTPATIENT
Start: 2024-04-16 | End: 2024-04-17 | Stop reason: HOSPADM

## 2024-04-16 RX ORDER — CALCIUM CARBONATE 500 MG/1
1000 TABLET, CHEWABLE ORAL 4 TIMES DAILY PRN
Status: DISCONTINUED | OUTPATIENT
Start: 2024-04-16 | End: 2024-04-17 | Stop reason: HOSPADM

## 2024-04-16 RX ORDER — EPHEDRINE SULFATE 50 MG/ML
INJECTION, SOLUTION INTRAMUSCULAR; INTRAVENOUS; SUBCUTANEOUS PRN
Status: DISCONTINUED | OUTPATIENT
Start: 2024-04-16 | End: 2024-04-16

## 2024-04-16 RX ORDER — PROPOFOL 10 MG/ML
INJECTION, EMULSION INTRAVENOUS CONTINUOUS PRN
Status: DISCONTINUED | OUTPATIENT
Start: 2024-04-16 | End: 2024-04-16

## 2024-04-16 RX ORDER — ONDANSETRON 4 MG/1
4 TABLET, ORALLY DISINTEGRATING ORAL EVERY 6 HOURS PRN
Status: DISCONTINUED | OUTPATIENT
Start: 2024-04-16 | End: 2024-04-17 | Stop reason: HOSPADM

## 2024-04-16 RX ORDER — NALOXONE HYDROCHLORIDE 0.4 MG/ML
0.2 INJECTION, SOLUTION INTRAMUSCULAR; INTRAVENOUS; SUBCUTANEOUS
Status: DISCONTINUED | OUTPATIENT
Start: 2024-04-16 | End: 2024-04-17 | Stop reason: HOSPADM

## 2024-04-16 RX ORDER — FLUMAZENIL 0.1 MG/ML
0.2 INJECTION, SOLUTION INTRAVENOUS
Status: ACTIVE | OUTPATIENT
Start: 2024-04-16 | End: 2024-04-16

## 2024-04-16 RX ORDER — ONDANSETRON 2 MG/ML
4 INJECTION INTRAMUSCULAR; INTRAVENOUS EVERY 6 HOURS PRN
Status: DISCONTINUED | OUTPATIENT
Start: 2024-04-16 | End: 2024-04-17 | Stop reason: HOSPADM

## 2024-04-16 RX ORDER — NALOXONE HYDROCHLORIDE 0.4 MG/ML
0.1 INJECTION, SOLUTION INTRAMUSCULAR; INTRAVENOUS; SUBCUTANEOUS
Status: CANCELLED | OUTPATIENT
Start: 2024-04-16

## 2024-04-16 RX ORDER — OXYCODONE HYDROCHLORIDE 5 MG/1
5 TABLET ORAL EVERY 4 HOURS PRN
Status: DISCONTINUED | OUTPATIENT
Start: 2024-04-16 | End: 2024-04-17 | Stop reason: HOSPADM

## 2024-04-16 RX ORDER — AMOXICILLIN 250 MG
1 CAPSULE ORAL 2 TIMES DAILY PRN
Status: DISCONTINUED | OUTPATIENT
Start: 2024-04-16 | End: 2024-04-17 | Stop reason: HOSPADM

## 2024-04-16 RX ORDER — FENTANYL CITRATE 50 UG/ML
50 INJECTION, SOLUTION INTRAMUSCULAR; INTRAVENOUS EVERY 5 MIN PRN
Status: CANCELLED | OUTPATIENT
Start: 2024-04-16

## 2024-04-16 RX ORDER — PROPOFOL 10 MG/ML
INJECTION, EMULSION INTRAVENOUS PRN
Status: DISCONTINUED | OUTPATIENT
Start: 2024-04-16 | End: 2024-04-16

## 2024-04-16 RX ORDER — ONDANSETRON 4 MG/1
4 TABLET, ORALLY DISINTEGRATING ORAL EVERY 30 MIN PRN
Status: CANCELLED | OUTPATIENT
Start: 2024-04-16

## 2024-04-16 RX ORDER — HYDROMORPHONE HYDROCHLORIDE 1 MG/ML
0.4 INJECTION, SOLUTION INTRAMUSCULAR; INTRAVENOUS; SUBCUTANEOUS EVERY 5 MIN PRN
Status: CANCELLED | OUTPATIENT
Start: 2024-04-16

## 2024-04-16 RX ORDER — HYDROMORPHONE HYDROCHLORIDE 1 MG/ML
0.2 INJECTION, SOLUTION INTRAMUSCULAR; INTRAVENOUS; SUBCUTANEOUS EVERY 5 MIN PRN
Status: CANCELLED | OUTPATIENT
Start: 2024-04-16

## 2024-04-16 RX ORDER — AMOXICILLIN 250 MG
2 CAPSULE ORAL 2 TIMES DAILY PRN
Status: DISCONTINUED | OUTPATIENT
Start: 2024-04-16 | End: 2024-04-17 | Stop reason: HOSPADM

## 2024-04-16 RX ORDER — FENTANYL CITRATE 50 UG/ML
25 INJECTION, SOLUTION INTRAMUSCULAR; INTRAVENOUS EVERY 5 MIN PRN
Status: CANCELLED | OUTPATIENT
Start: 2024-04-16

## 2024-04-16 RX ORDER — LIDOCAINE 40 MG/G
CREAM TOPICAL
Status: DISCONTINUED | OUTPATIENT
Start: 2024-04-16 | End: 2024-04-17 | Stop reason: HOSPADM

## 2024-04-16 RX ADMIN — PROPOFOL 10 MG: 10 INJECTION, EMULSION INTRAVENOUS at 11:08

## 2024-04-16 RX ADMIN — PROPOFOL 150 MCG/KG/MIN: 10 INJECTION, EMULSION INTRAVENOUS at 11:06

## 2024-04-16 RX ADMIN — DEXMEDETOMIDINE HYDROCHLORIDE 4 MCG: 100 INJECTION, SOLUTION INTRAVENOUS at 11:10

## 2024-04-16 RX ADMIN — PANTOPRAZOLE SODIUM 40 MG: 40 INJECTION, POWDER, FOR SOLUTION INTRAVENOUS at 19:58

## 2024-04-16 RX ADMIN — ONDANSETRON 4 MG: 2 INJECTION INTRAMUSCULAR; INTRAVENOUS at 11:11

## 2024-04-16 RX ADMIN — LIDOCAINE HYDROCHLORIDE 5 ML: 10 INJECTION, SOLUTION INFILTRATION; PERINEURAL at 11:06

## 2024-04-16 RX ADMIN — PROPOFOL 30 MG: 10 INJECTION, EMULSION INTRAVENOUS at 11:07

## 2024-04-16 RX ADMIN — Medication 5 MG: at 11:24

## 2024-04-16 RX ADMIN — PANTOPRAZOLE SODIUM 40 MG: 40 INJECTION, POWDER, FOR SOLUTION INTRAVENOUS at 06:45

## 2024-04-16 RX ADMIN — SODIUM CHLORIDE, PRESERVATIVE FREE: 5 INJECTION INTRAVENOUS at 06:22

## 2024-04-16 RX ADMIN — SODIUM CHLORIDE, POTASSIUM CHLORIDE, SODIUM LACTATE AND CALCIUM CHLORIDE: 600; 310; 30; 20 INJECTION, SOLUTION INTRAVENOUS at 09:16

## 2024-04-16 RX ADMIN — DEXMEDETOMIDINE HYDROCHLORIDE 12 MCG: 100 INJECTION, SOLUTION INTRAVENOUS at 11:05

## 2024-04-16 RX ADMIN — PROPOFOL 50 MG: 10 INJECTION, EMULSION INTRAVENOUS at 11:06

## 2024-04-16 ASSESSMENT — ACTIVITIES OF DAILY LIVING (ADL)
ADLS_ACUITY_SCORE: 36
ADLS_ACUITY_SCORE: 36
ADLS_ACUITY_SCORE: 34
ADLS_ACUITY_SCORE: 34
DEPENDENT_IADLS:: INDEPENDENT
ADLS_ACUITY_SCORE: 33
ADLS_ACUITY_SCORE: 36
ADLS_ACUITY_SCORE: 36
ADLS_ACUITY_SCORE: 33
ADLS_ACUITY_SCORE: 34
ADLS_ACUITY_SCORE: 33
ADLS_ACUITY_SCORE: 36
ADLS_ACUITY_SCORE: 36
ADLS_ACUITY_SCORE: 33
ADLS_ACUITY_SCORE: 24
ADLS_ACUITY_SCORE: 24
ADLS_ACUITY_SCORE: 34
ADLS_ACUITY_SCORE: 36
ADLS_ACUITY_SCORE: 33
ADLS_ACUITY_SCORE: 24
ADLS_ACUITY_SCORE: 24
ADLS_ACUITY_SCORE: 36
ADLS_ACUITY_SCORE: 36
ADLS_ACUITY_SCORE: 33

## 2024-04-16 NOTE — ANESTHESIA CARE TRANSFER NOTE
Patient: Wiley Storey    Procedure: Procedure(s):  ESOPHAGOGASTRODUODENOSCOPY       Diagnosis: Gastrointestinal hemorrhage with melena [K92.1]  Diagnosis Additional Information: No value filed.    Anesthesia Type:   MAC     Note:    Oropharynx: oropharynx clear of all foreign objects and spontaneously breathing  Level of Consciousness: drowsy  Oxygen Supplementation: room air    Independent Airway: airway patency satisfactory and stable  Dentition: dentition unchanged  Vital Signs Stable: post-procedure vital signs reviewed and stable  Report to RN Given: handoff report given  Patient transferred to: Medical/Surgical Unit    Handoff Report: Identifed the Patient, Identified the Reponsible Provider, Reviewed the pertinent medical history, Discussed the surgical course, Reviewed Intra-OP anesthesia mangement and issues during anesthesia, Set expectations for post-procedure period and Allowed opportunity for questions and acknowledgement of understanding  Vitals:  Vitals Value Taken Time   BP     Temp     Pulse     Resp     SpO2         Electronically Signed By: KALANI Ness CRNA  April 16, 2024  11:29 AM

## 2024-04-16 NOTE — H&P
[facility]    History and Physical - Hospitalist Service       Date of Admission:  4/15/2024    Assessment & Plan      Wiley Storey is a 73 year old male admitted on 4/15/2024. He presented with melanotic stool while on warfarin after a recent TAVR.     GI bleed, unclear if upper or lower  Acute blood loss anemia  -with multiple melanotic stools per day  -hemoglobin dropped from almost 11 to low 9's over the course of today  -INR 2.4  -hemodynamically stable so far  -will admit to the hospital  -start iv protonix  -consult GI for possible scope  -monitor hg closely  -keep NPO for now    Recent TAVR on long term anticoagulation with warfarin  -awaiting cardiology's recommendations for possible warfarin reversal  -monitor    Coronary artery disease  Previous CABG  Hyperlipidemia  -continue ezetimibe        Diet:  NPO  DVT Prophylaxis: Ambulate every shift  Lara Catheter: Not present  Lines: None     Cardiac Monitoring: None  Code Status:  full code    Clinically Significant Risk Factors Present on Admission               # Drug Induced Coagulation Defect: home medication list includes an anticoagulant medication    # Hypertension: Noted on problem list  # Chronic heart failure with preserved ejection fraction: heart failure noted on problem list and last echo with EF >50%          # History of CABG: noted on surgical history       Disposition Plan     Medically Ready for Discharge: Anticipated in 2-4 Days           Reese Ott MD  Hospitalist Service  [facility]  Securely message with [employer] (more info)  Text page via [employer] MD Paging/Directory     ______________________________________________________________________    Chief Complaint   Dark stools    History is obtained from the patient    History of Present Illness   Wiley Storey is a 73 year old male with recent TAVR on warfarin presented with dark stool. Stool first noted to be dark about 1 week prior  to admission. He has about 2 melanotic stools per day. He's also following with cardiology due to recent TAVR and was noted to have worsening anemia with baseline hg of 15 down to about 10 on day of admission. In the ED, the patient was hemodynamically stable. Labs showed continued worsening hemoglobin. When seen by this service in the ED, the patient reported that he was feeling ok aside from the urgency to defect after eating over the last week or so. No other concerns or new symptoms. No fevers, no chills, no chest pain, no difficulty in breathing, no abdominal pain, no nausea, no vomiting, no rashes, and no swelling.    Past Medical History    Past Medical History:   Diagnosis Date    Atrial fibrillation (H)     Maze 3/2012    CAD (coronary artery disease)     s/p CABG 3/2012-  LIMA to LAD and RSVGs to OM, D, and RPDA    GERD (gastroesophageal reflux disease)     GI bleed 1991    History of blood transfusion     Hyperlipidemia     Hypertension     NSVT (nonsustained ventricular tachycardia) (H)     Sleep apnea     uses CPAP    Sleep apnea     cpap    Thyroid disease     hyperthyroid       Past Surgical History   Past Surgical History:   Procedure Laterality Date    ANESTHESIA CARDIOVERSION  07/02/2012    Procedure: ANESTHESIA CARDIOVERSION;  Anesthesia Off site Cardioversion;  Surgeon: Provider, Generic Anesthesia;  Location: [facility] OR    BYPASS GRAFT ARTERY CORONARY  03/08/2012    Procedure:BYPASS GRAFT ARTERY CORONARY; Median Sternotomy, Coronary Artery Bypass Graft X4 Using the Left Internal Mamary and Left Saphenous Vein with MAZE Procedure, and On Pump Oxygenator.; Surgeon:MICHOACANO HOANG; Location:[facility] OR    COLONOSCOPY N/A 08/16/2016    Procedure: COLONOSCOPY;  Surgeon: Brad Peralta MD;  Location:  GI    COLONOSCOPY N/A 11/10/2016    Procedure: COLONOSCOPY;  Surgeon: Brad Peralta MD;  Location:  GI    MAZE PROCEDURE  03/08/2012    Procedure:MAZE PROCEDURE; Surgeon:MICHOACANO HOANG; Location:[facility] OR     ulcer operation  1991    ZZC TRANSCATHETER AORTIC VALVE REPLACE (TAVR/LASHAE), W/PROSTH VALVE; TRANSCAVAL APPR  2024    See [place] Admission from 2024       Prior to Admission Medications   Prior to Admission Medications   Prescriptions Last Dose Informant Patient Reported? Taking?   Blood Pressure Monitoring (B-D ASSURE BPM/AUTO ARM CUFF) MISC   No No   Si Device daily   CRANBERRY EXTRACT PO   Yes No   Sig: Take 2 tablets by mouth daily   Cholecalciferol (VITAMIN D PO)   Yes No   Sig: Take 3,000 Units by mouth daily 1 tab daily   Multiple Vitamins-Minerals (MULTIVITAL PO)   Yes No   Sig: Take by mouth daily 1 TABLET DAILY   ORDER FOR DME   Yes No   Sig: Use your BiPAP device as directed by your provider.   amoxicillin (AMOXIL) 500 MG capsule   Yes No   Sig: Take 4 caps (2000 mg) 1 hour prior to dental procedure.   ezetimibe (ZETIA) 10 MG tablet   No No   Sig: Take 1 tablet (10 mg) by mouth daily   saw palmetto 450 MG CAPS capsule   Yes No   Sig: Take 900 mg by mouth daily   vitamin B complex with vitamin C (VITAMIN  B COMPLEX) tablet   Yes No   Sig: Take 1 tablet by mouth daily   vitamin B-12 (CYANOCOBALAMIN) 1000 MCG tablet   Yes No   Sig: Take 1,000 mcg by mouth three times a week   vitamin C (ASCORBIC ACID) 1000 MG TABS   Yes No   Sig: Take 500 mg by mouth daily.   warfarin ANTICOAGULANT (COUMADIN) 2 MG tablet   No No   Simg on  and 4mg all other days or as directed by INR clinic      Facility-Administered Medications: None           Physical Exam   Vital Signs: Temp: 98.1  F (36.7  C) Temp src: Oral BP: (!) 152/79 Pulse: 69   Resp: 18 SpO2: 99 % O2 Device: None (Room air)    Weight: 154 lbs 0 oz    Gen:  no acute distress; lying in bed  HEENT:  Anicteric sclera, hearing intact to voice  Resp:  breathing normally on room air. No rales, wheezing, or stridor  Card:  normal rate, normal rhythm. systolic murmur appreciated  Abd:  Soft, non-tender, non-distended, normoactive bowel  sounds are present  Musc:  Normal strength and movement of the major muscle groups without obvious deformity  Psych:  Alert; oriented to person, place and time, not anxious, not agitated  Extr:  no edema      Medical Decision Making             Data

## 2024-04-16 NOTE — PLAN OF CARE
Goal Outcome Evaluation:      Plan of Care Reviewed With: patient    Overall Patient Progress: improvingOverall Patient Progress: improving         Pt A&Ox4, denies pain, N/V/D, SOB and dizziness. Pt reported feeling drowsy after procedure. Regular diet ordered. NS running at 100 mL/hr

## 2024-04-16 NOTE — CARE PLAN
PRIMARY DIAGNOSIS: GI BLEED    OUTPATIENT/OBSERVATION GOALS TO BE MET BEFORE DISCHARGE  Orthostatic performed: No    Stable Hgb No.   Recent Labs   Lab Test 04/16/24  0816 04/15/24  2112 04/15/24  1857   HGB 9.0* 9.3* 10.2*       Resolved or declined bleeding episodes: Yes Last episode: 4/16/24 MORNING     Appropriate testing complete: No    Cleared for discharge by consultants (if involved): No    Safe discharge environment identified: No    Discharge Planner Nurse   Safe discharge environment identified: No  Barriers to discharge: Yes       Entered by: Rebecca Vasquez RN 04/16/2024 11:57 AM     Please review provider order for any additional goals.   Nurse to notify provider when observation goals have been met and patient is ready for discharge.    Pt drowsy but A&Ox4. Denies pain, N/V/D and SOB. Reported BM this morning before procedure.

## 2024-04-16 NOTE — ANESTHESIA POSTPROCEDURE EVALUATION
Patient: Wiley Storey    Procedure: Procedure(s):  ESOPHAGOGASTRODUODENOSCOPY       Anesthesia Type:  MAC    Note:     Postop Pain Control: Uneventful            Sign Out: Well controlled pain   PONV: No   Neuro/Psych: Uneventful            Sign Out: Acceptable/Baseline neuro status   Airway/Respiratory: Uneventful            Sign Out: Acceptable/Baseline resp. status   CV/Hemodynamics: Uneventful            Sign Out: Acceptable CV status; No obvious hypovolemia; No obvious fluid overload   Other NRE: NONE   DID A NON-ROUTINE EVENT OCCUR?            Last vitals:  Vitals:    04/16/24 0748 04/16/24 0900 04/16/24 1144   BP:  (!) 150/85 91/59   Pulse: 68 61 76   Resp: 18 16 16   Temp: 36.6  C (97.8  F) 36.4  C (97.5  F) 36.4  C (97.6  F)   SpO2: 98% 99% 97%       Electronically Signed By: Hugo Garrett MD  April 16, 2024  11:58 AM

## 2024-04-16 NOTE — PLAN OF CARE
PRIMARY DIAGNOSIS: GI BLEED    OUTPATIENT/OBSERVATION GOALS TO BE MET BEFORE DISCHARGE  Orthostatic performed: No    Stable Hgb Yes.   Recent Labs   Lab Test 04/15/24  2112 04/15/24  1857 04/15/24  1549   HGB 9.3* 10.2* 10.7*       Resolved or declined bleeding episodes: No,  black stools  Last episode: NOC, see charting    Appropriate testing complete: N/A    Cleared for discharge by consultants (if involved): No    Safe discharge environment identified: Yes    Discharge Planner Nurse   Safe discharge environment identified: Yes  Barriers to discharge: Yes       Entered by: Peggy Candelaria RN 04/16/2024 6:14 AM   Peggy Candelaria RN    Please review provider order for any additional goals.   Nurse to notify provider when observation goals have been met and patient is ready for discharge.Goal Outcome Evaluation:

## 2024-04-16 NOTE — CONSULTS
Care Management Initial Consult    General Information  Assessment completed with:  ,    Type of CM/SW Visit: Initial Assessment    Primary Care Provider verified and updated as needed:     Readmission within the last 30 days: no previous admission in last 30 days      Reason for Consult: discharge planning  Advance Care Planning:            Communication Assessment  Patient's communication style: spoken language (English or Bilingual)    Hearing Difficulty or Deaf: yes   Wear Glasses or Blind: yes    Cognitive  Cognitive/Neuro/Behavioral: WDL                      Living Environment:   People in home: spouse     Current living Arrangements: house      Able to return to prior arrangements: yes       Family/Social Support:  Care provided by: self, spouse/significant other, child(cail)  Provides care for: no one  Marital Status:   Wife          Description of Support System:           Current Resources:   Patient receiving home care services: No     Community Resources: None  Equipment currently used at home: none  Supplies currently used at home: None    Employment/Financial:  Employment Status:          Financial Concerns:             Does the patient's insurance plan have a 3 day qualifying hospital stay waiver?  No    Lifestyle & Psychosocial Needs:  Social Determinants of Health     Food Insecurity: Low Risk  (2/5/2024)    Food Insecurity     Within the past 12 months, did you worry that your food would run out before you got money to buy more?: No     Within the past 12 months, did the food you bought just not last and you didn t have money to get more?: No   Depression: Not at risk (2/5/2024)    PHQ-2     PHQ-2 Score: 0   Housing Stability: Low Risk  (2/5/2024)    Housing Stability     Do you have housing? : Yes     Are you worried about losing your housing?: No   Tobacco Use: Medium Risk (4/16/2024)    Patient History     Smoking Tobacco Use: Former     Smokeless Tobacco Use: Never     Passive Exposure:  Never   Financial Resource Strain: Low Risk  (2/5/2024)    Financial Resource Strain     Within the past 12 months, have you or your family members you live with been unable to get utilities (heat, electricity) when it was really needed?: No   Alcohol Use: Not At Risk (7/11/2019)    Received from [facility]    AUDIT-C     Frequency of Alcohol Consumption: 2-4 times a month     Average Number of Drinks: 1 or 2     Frequency of Binge Drinking: Never   Transportation Needs: Low Risk  (2/5/2024)    Transportation Needs     Within the past 12 months, has lack of transportation kept you from medical appointments, getting your medicines, non-medical meetings or appointments, work, or from getting things that you need?: No   Physical Activity: Not on file   Interpersonal Safety: Low Risk  (4/15/2024)    Interpersonal Safety     Do you feel physically and emotionally safe where you currently live?: Yes     Within the past 12 months, have you been hit, slapped, kicked or otherwise physically hurt by someone?: No     Within the past 12 months, have you been humiliated or emotionally abused in other ways by your partner or ex-partner?: No   Stress: Not on file   Social Connections: Not on file   Health Literacy: Not on file       Functional Status:  Prior to admission patient needed assistance:   Dependent ADLs:: Independent  Dependent IADLs:: Independent  Assesssment of Functional Status: At functional baseline    Mental Health Status:  Mental Health Status: No Current Concerns       Chemical Dependency Status:  Chemical Dependency Status: No Current Concerns             Values/Beliefs:  Spiritual, Cultural Beliefs, Jewish Practices, Values that affect care:                 Additional Information:  SW met with pt to introduce role of CM, complete  initial assessment, and to discuss needs at time of d/c. Pt comes from home; lives with spouse, and noted to be independent at baseline. Pt  feels that there will be no needs from CM at discharge, and will follow with PCP if needs arise post discharge.    CM to continue to follow through hospitalization.  2:27 PM    Brenna Kjellberg, BSW LSW  4/16/2024

## 2024-04-16 NOTE — ANESTHESIA PREPROCEDURE EVALUATION
Anesthesia Pre-Procedure Evaluation    Patient: Wiley Storey   MRN: [id number] : 1950        Procedure : Procedure(s):  ESOPHAGOGASTRODUODENOSCOPY          Past Medical History:   Diagnosis Date    Atrial fibrillation (H)     Maze 3/2012    CAD (coronary artery disease)     s/p CABG 3/2012-  LIMA to LAD and RSVGs to OM, D, and RPDA    GERD (gastroesophageal reflux disease)     GI bleed 1991    History of blood transfusion     Hyperlipidemia     Hypertension     NSVT (nonsustained ventricular tachycardia) (H)     Sleep apnea     uses CPAP    Sleep apnea     cpap    Thyroid disease     hyperthyroid      Past Surgical History:   Procedure Laterality Date    ANESTHESIA CARDIOVERSION  2012    Procedure: ANESTHESIA CARDIOVERSION;  Anesthesia Off site Cardioversion;  Surgeon: Provider, Generic Anesthesia;  Location: [facility] OR    BYPASS GRAFT ARTERY CORONARY  2012    Procedure:BYPASS GRAFT ARTERY CORONARY; Median Sternotomy, Coronary Artery Bypass Graft X4 Using the Left Internal Mamary and Left Saphenous Vein with MAZE Procedure, and On Pump Oxygenator.; Surgeon:MICHOACANO HOANG; Location:[facility] OR    COLONOSCOPY N/A 2016    Procedure: COLONOSCOPY;  Surgeon: Brad Peralta MD;  Location:  GI    COLONOSCOPY N/A 11/10/2016    Procedure: COLONOSCOPY;  Surgeon: Brad Peralta MD;  Location:  GI    MAZE PROCEDURE  2012    Procedure:MAZE PROCEDURE; Surgeon:MICHOACANO HOANG; Location:[facility] OR    ulcer operation  1991    [facility] TRANSCATHETER AORTIC VALVE REPLACE (TAVR/LASHAE), W/PROSTH VALVE; TRANSCAVAL APPR  2024    See [place] Admission from 2024      Allergies   Allergen Reactions    Aspirin      Early , took one dose of aspirin + ibuprofen and had severe GI bleed - hospitalization required    Ibuprofen      Early , took one dose of aspirin + ibuprofen and had severe GI bleed -hospitalization required      Social History     Tobacco Use    Smoking status: Former      Current packs/day: 0.00     Types: Cigarettes     Quit date: 1983     Years since quittin.3     Passive exposure: Never    Smokeless tobacco: Never   Substance Use Topics    Alcohol use: Yes     Comment: occ wine      Wt Readings from Last 1 Encounters:   04/15/24 69.9 kg (154 lb)        Anesthesia Evaluation   Pt has had prior anesthetic.     No history of anesthetic complications       ROS/MED HX  ENT/Pulmonary:     (+) sleep apnea,                                       Neurologic:  - neg neurologic ROS     Cardiovascular:     (+)  hypertension- -  CAD -  CABG- -   Taking blood thinners   CHF                     valvular problems/murmurs  s/p TAVR.         METS/Exercise Tolerance: >4 METS    Hematologic:  - neg hematologic  ROS   (+)      anemia,          Musculoskeletal:  - neg musculoskeletal ROS     GI/Hepatic:     (+) GERD,                   Renal/Genitourinary:     (+) renal disease, type: CRI,            Endo:     (+)          thyroid problem, hypothyroidism,           Psychiatric/Substance Use:  - neg psychiatric ROS     Infectious Disease:  - neg infectious disease ROS     Malignancy:  - neg malignancy ROS     Other:  - neg other ROS          Physical Exam    Airway  airway exam normal      Mallampati: II   TM distance: > 3 FB   Neck ROM: full   Mouth opening: > 3 cm    Respiratory Devices and Support         Dental  no notable dental history         Cardiovascular   cardiovascular exam normal          Pulmonary   pulmonary exam normal                OUTSIDE LABS:  CBC:   Lab Results   Component Value Date    WBC 4.8 04/15/2024    WBC 5.1 2024    HGB 9.0 (L) 2024    HGB 9.3 (L) 04/15/2024    HCT 31.1 (L) 04/15/2024    HCT 43.9 2024     (L) 04/15/2024    PLT 95 (L) 2024     BMP:   Lab Results   Component Value Date     2024     04/15/2024    POTASSIUM 3.7 2024    POTASSIUM 4.4 04/15/2024    CHLORIDE 108 (H) 2024    CHLORIDE 108 (H)  04/15/2024    CO2 24 04/16/2024    CO2 23 04/15/2024    BUN 29.5 (H) 04/16/2024    BUN 33.6 (H) 04/15/2024    CR 1.01 04/16/2024    CR 1.09 04/15/2024    GLC 95 04/16/2024    GLC 93 04/16/2024     COAGS:   Lab Results   Component Value Date    PTT 36 03/09/2012    INR 1.97 (H) 04/16/2024    FIBR 260 03/08/2012     POC:   Lab Results   Component Value Date     (H) 03/11/2012     HEPATIC:   Lab Results   Component Value Date    ALBUMIN 3.6 04/15/2024    PROTTOTAL 5.3 (L) 04/15/2024    ALT 20 04/15/2024    AST 28 04/15/2024    ALKPHOS 44 04/15/2024    BILITOTAL 0.3 04/15/2024     OTHER:   Lab Results   Component Value Date    PH 7.33 (L) 03/09/2012    A1C 5.5 03/08/2012    FLOWER 8.2 (L) 04/16/2024    PHOS 3.5 03/12/2012    MAG 2.2 01/15/2016    LIPASE <10 (L) 03/10/2012    AMYLASE <30 (L) 03/10/2012    TSH 3.18 01/15/2016    T4 0.91 09/05/2014    T3 100 04/24/2012    CRP 1.3 10/12/2007       Anesthesia Plan    ASA Status:  3    NPO Status:  NPO Appropriate    Anesthesia Type: MAC.     - Reason for MAC: straight local not clinically adequate   Induction: Propofol.   Maintenance: TIVA.        Consents    Anesthesia Plan(s) and associated risks, benefits, and realistic alternatives discussed. Questions answered and patient/representative(s) expressed understanding.     - Discussed:     - Discussed with:  Patient      - Extended Intubation/Ventilatory Support Discussed: No.      - Patient is DNR/DNI Status: No     Use of blood products discussed: No .     Postoperative Care    Pain management: IV analgesics, Multi-modal analgesia, Oral pain medications.   PONV prophylaxis: Ondansetron (or other 5HT-3)     Comments:               Hugo Garrett MD    I have reviewed the pertinent notes and labs in the chart from the past 30 days and (re)examined the patient.  Any updates or changes from those notes are reflected in this note.       # Hypocalcemia: Lowest Ca = 8.2 mg/dL in last 2 days, will monitor and replace as  appropriate      # Drug Induced Coagulation Defect: home medication list includes an anticoagulant medication

## 2024-04-16 NOTE — PLAN OF CARE
PRIMARY DIAGNOSIS: GI BLEED    OUTPATIENT/OBSERVATION GOALS TO BE MET BEFORE DISCHARGE  Orthostatic performed: No    Stable Hgb Yes.   Recent Labs   Lab Test 04/15/24  2112 04/15/24  1857 04/15/24  1549   HGB 9.3* 10.2* 10.7*       Resolved or declined bleeding episodes: No,  stools are soft and black  Last episode: NOC    Appropriate testing complete: Yes    Cleared for discharge by consultants (if involved): No    Safe discharge environment identified: Yes    Discharge Planner Nurse   Safe discharge environment identified: Yes  Barriers to discharge: Yes       Entered by: Peggy Candelaria RN 04/16/2024 6:13 AM   Pegyg Candelaria RN    Please review provider order for any additional goals.   Nurse to notify provider when observation goals have been met and patient is ready for discharge.Goal Outcome Evaluation:

## 2024-04-16 NOTE — PLAN OF CARE
Goal Outcome Evaluation:         Pt transferred to [facility] via transporter for EGD.  Belongings with pt.  Short stay unit updated.

## 2024-04-16 NOTE — ED NOTES
[facility] ED Handoff Report    ED Chief Complaint: Rectal bleeding.    ED Diagnosis:  (K92.1) Gastrointestinal hemorrhage with melena      (Z79.01) Anticoagulated on warfarin    (Z95.2) S/P TAVR (transcatheter aortic valve replacement)       PMH:    Past Medical History:   Diagnosis Date    Atrial fibrillation (H)     Maze 3/2012    CAD (coronary artery disease)     s/p CABG 3/2012-  LIMA to LAD and RSVGs to OM, D, and RPDA    GERD (gastroesophageal reflux disease)     GI bleed 1991    History of blood transfusion     Hyperlipidemia     Hypertension     NSVT (nonsustained ventricular tachycardia) (H)     Sleep apnea     uses CPAP    Sleep apnea     cpap    Thyroid disease     hyperthyroid        Code Status:  Prior     Falls Risk: Yes Band: Applied    Current Living Situation/Residence: lives in a house     Elimination Status: Continent: Yes     Activity Level: SBA    Patients Preferred Language:  English     Needed: No    Vital Signs:  BP (!) 152/79   Pulse 69   Temp 98.1  F (36.7  C) (Oral)   Resp 18   Wt 69.9 kg (154 lb)   SpO2 99%   BMI 24.43 kg/m       Cardiac Rhythm:     Pain Score: 0/10    Is the Patient Confused:  No    Last Food or Drink: 04/15/24 at 2130, water only.    Focused Assessment:  Pt is alert and oriented. Denies any pain. Pt denies any lightheadedness or dizziness. VSS. Pt reports that he hasn't had a BM since he has been here but reports dark stools at home.    Tests Performed: Done: Labs and Imaging    Treatments Provided:  See pt's chart.    Family Dynamics/Concerns: No    Family Updated On Visitor Policy: Yes    Plan of Care Communicated to Family: Yes    Who Was Updated about Plan of Care: Pt able to update his family.    Belongings Checklist Done and Signed by Patient: Yes    Belongings Sent with Patient: Remains with pt.    Medications sent with patient: NA    Covid: asymptomatic , Not tested.    Additional Information: NA.    RN: Blaze Farfan RN 4/15/2024 10:45  PM

## 2024-04-16 NOTE — H&P
GASTROENTEROLOGY PRE-PROCEDURAL HISTORY AND PHYSICAL     INDICATION FOR PROCEDURE   Melena     HISTORY    Reviewed and no change.     PHYSICAL EXAMINATION     Vitals Blood pressure (!) 150/85, pulse 61, temperature 97.5  F (36.4  C), temperature source Oral, resp. rate 16, weight 69.9 kg (154 lb), SpO2 99%.          Physical Exam  General: awake, alert, oriented times three   Cardiovascular: RRR, no edema   Airway: Normal   Chest: lungs are clear to auscultation bilaterally   Abdomen: soft, non-tender        PREVIOUS REACTION TO SEDATION/ANESTHESIA    None     SEDATION PLAN BASED ON ASSESSMENT   Per Anesthesia     ASA CLASSIFICATION   ASA Classification: 3     MALLAMPATI SCORE     Class II      IMPRESSION   Patient deemed adequate candidate for anesthesia.     PLANNED PROCEDURE   EGD     Hugo Burnette MD [facility]  I appreciate the opportunity to participate in the care of this patient.   Please feel free to call me with any questions or concerns.

## 2024-04-16 NOTE — MEDICATION SCRIBE - ADMISSION MEDICATION HISTORY
Medication Scribe Admission Medication History    Admission medication history is complete. The information provided in this note is only as accurate as the sources available at the time of the update.    Information Source(s): Patient and CareEverywhere/SureScripts via in-person    Pertinent Information:     Changes made to PTA medication list:  Added: probiotic gummies   Deleted: None  Changed: None    Allergies reviewed with patient and updates made in EHR: yes    Medication History Completed By: LIZETTE CORTÉS 4/15/2024 10:09 PM    PTA Med List   Medication Sig Last Dose    amoxicillin (AMOXIL) 500 MG capsule Take 4 caps (2000 mg) 1 hour prior to dental procedure. Unknown at dental procedure only    Cholecalciferol (VITAMIN D PO) Take 3,000 Units by mouth daily 1 tab daily 4/15/2024 at am    CRANBERRY EXTRACT PO Take 2 tablets by mouth daily 4/15/2024 at am    ezetimibe (ZETIA) 10 MG tablet Take 1 tablet (10 mg) by mouth daily 4/15/2024 at am    Multiple Vitamins-Minerals (MULTIVITAL PO) Take by mouth daily 1 TABLET DAILY 4/15/2024 at am    Probiotic Product (PROBIOTIC GUMMIES PO) Take 1 tablet by mouth 2 times daily 4/15/2024 at am    saw palmetto 450 MG CAPS capsule Take 900 mg by mouth daily 4/15/2024 at am    vitamin B complex with vitamin C (VITAMIN  B COMPLEX) tablet Take 1 tablet by mouth daily 4/15/2024 at am    vitamin B-12 (CYANOCOBALAMIN) 1000 MCG tablet Take 1,000 mcg by mouth three times a week 4/14/2024 at pm    vitamin C (ASCORBIC ACID) 1000 MG TABS Take 500 mg by mouth daily. 4/15/2024 at am    warfarin ANTICOAGULANT (COUMADIN) 2 MG tablet 6mg on Thursdays and 4mg all other days or as directed by INR clinic 4/14/2024 at pm

## 2024-04-16 NOTE — PROGRESS NOTES
INR 2.4 on 4/15/24 noted.  Range recently decreased to 2.0-2.5.  End date of Warfarin is 5/1/24.  Patient was admitted to the hospital on 4/15/24 for further evaluation of GI hemorrhage with melena.  GI recommended vit K and scope.  Care was also discussed with [facility] and they recommended starting aspirin, off warfarin, and reverse warfarin if indicated and with the continued drop in hemoglobin then vit K would be given in the ER.  Will await the discharge summary to confirm if Warfarin was stopped then discharge patient from  [facility].  Zia COLES

## 2024-04-16 NOTE — CARE PLAN
PRIMARY Concern: dark stools  SAFETY RISK Concerns (fall risk, behaviors, etc.): fall risk d/t anesthesia   Isolation/Type: none  Tests/Procedures for NEXT shift: TBD  Consults? (Pending/following, signed-off?) GI  Other Important info for NEXT shift: none  Anticipated DC date & active delays: TBD  __________________________________________________________  Orientation/Cognitive: A&Ox4  Observation Goals (Met/ Not Met): not met  Mobility Level/Assist Equipment: A1  Antibiotics & Plan (IV/po, length of tx left): none  Pain Management: denies  Tele/VS/O2: VSS, RA  ABNL Lab/BG: none  Diet: regular  Bowel/Bladder: continent, up to toilet  Skin Concerns: none  Drains/Devices: PIV infusing 100 mL/hr NS

## 2024-04-16 NOTE — PROGRESS NOTES
[facility]    Medicine Progress Note - Hospitalist Service    Date of Admission:  4/15/2024    Assessment & Plan   Wiley Storey is a 73 year old male admitted on 4/15/2024. He presented with melanotic stool while on warfarin after a recent TAVR (2/1/24). Went to ER 4/8/24 for rectal bleeding, but had normal Hgb at that time. Had a follow-up appointment 4/15 and found to have a hemoglobin of 10 and was instructed to present to ED. Hgb in ED 9.3. CT AP no active bleeding.  Underwent EGD today; nonbleeding duodenal ulcer seen.    Acute blood loss anemia   Duodenal Ulcer   With multiple melanotic stools per day. Stool guaiac positive at recent ED visit   - hemoglobin dropped from almost 11 to low 9's over the course of today (and down from 15.5 just one week ago)  - Hgb stable around 9 today   - INR 2.76, given vitamin K in ED --> 1.70  - started on iv protonix, continue   - GI consult;    - EGD today; non-bleeding duodenal ulcer    - Cont PPI BID x 8 weeks, then daily indefinitely   - Recheck Hgb in the AM  - No NSAIDs    Recent TAVR (2/1/24) on temporary warfarin   Patient was placed on 3-month long warfarin therapy s/p TAVR  - Per [facility] follow-up cards note; recommend discontinuing warfarin if continued Hgb drop  - Discontinuing warfarin  - Would consider adding ASA at discharge (reports history of GI bleed and remote history after taking ASA and ibuprofen)  - monitor INR   - Continue outpatient follow-up as directed    Coronary artery disease  Previous CABG  Hyperlipidemia  - continue ezetimibe  - consider ASA as above    BPH - hold saw palmetto with active bleeding  - Resume saw palmetto and cranberry at discharge          Diet: NPO for Medical/Clinical Reasons Except for: Ice Chips, Meds    DVT Prophylaxis: Pneumatic Compression Devices  Lara Catheter: Not present  Lines: None     Cardiac Monitoring: None  Code Status: Full Code      Clinically Significant Risk Factors Present on  Admission               # Drug Induced Coagulation Defect: home medication list includes an anticoagulant medication    # Hypertension: Noted on problem list  # Chronic heart failure with preserved ejection fraction: heart failure noted on problem list and last echo with EF >50%          # History of CABG: noted on surgical history       Disposition Plan     Medically Ready for Discharge: Anticipated Tomorrow       The patient's care was discussed with the Attending Physician, Dr. Ragini Balderrama who independently met with and assessed the patient and is in agreement with the assessment and plan    Bobbi Huff PA-C  Hospitalist [facility]  Securely message with [employer] (more info)  Text page via [facility] Paging/Directory   ______________________________________________________________________    Interval History   Patient reports 1 week history of black/purple stools.  States he has been having BMs 1-2 times a day for about a week.  Went to the ED 4/8 for evaluation of this and states hemoglobin was stable at that time but did have positive stool guaiac there.  Had a follow-up appt 4/9 with cardiology.  They discussed that if patient had continued rectal bleeding and or continued drop in hemoglobin it would be okay to discontinue warfarin at that time.  Patient states that he has continued with dark stools but states that they have now become more brown/black compared to black/purple.  States that he has felt more bloated with increased flatulence but denies any abdominal pain.  No nausea or vomiting.  No ibuprofen or other NSAID use.  Does have a history of GI bleed in 1991 secondary to GI ulcer that he states was caused by combination of ASA and ibuprofen use.  States he underwent EGD with cautery at that time and did receive a transfusion.    Discussed plan for EGD today and possible continued observation overnight depending on findings.  Patient agreeable, answered all  questions    Physical Exam   Vital Signs: Temp: 97.7  F (36.5  C) Temp src: Oral BP: 131/70 Pulse: 59   Resp: 16 SpO2: 99 % O2 Device: None (Room air)    Weight: 154 lbs 0 oz    Constitutional: awake, alert, no apparent distress, and appears stated age  Eyes: Lids and lashes normal, extra ocular muscles intact, sclera clear, conjunctiva normal  Respiratory: No increased work of breathing, no accessory muscle use, clear to auscultation bilaterally, no crackles or wheezing  Cardiovascular: Regular rate and rhythm, normal S1 and S2, no S3 or S4, and no murmur noted  GI: Soft, non-distended, non-tender, normal bowel sounds, no masses palpated, no hepatosplenomegaly  Skin: Normal skin color, texture, turgor. No rashes and no jaundice  Musculoskeletal: no lower extremity pitting edema present.   Neuropsychiatric: Appropriate mood, affect and eye contact. Cooperative.    Medical Decision Making             Data     I have personally reviewed the following data over the past 24 hrs:    N/A  \   9.2 (L)   / N/A     140 108 (H) 29.5 (H) /  95   3.7 24 1.01 \     ALT: 20 AST: 28 AP: 44 TBILI: 0.3   ALB: 3.6 TOT PROTEIN: 5.3 (L) LIPASE: N/A     INR:  1.70 (H) PTT:  N/A   D-dimer:  N/A Fibrinogen:  N/A       Imaging results reviewed over the past 24 hrs:   Recent Results (from the past 24 hour(s))   CTA Abdomen Pelvis with Contrast    Narrative    EXAM: CTA ABDOMEN PELVIS WITH CONTRAST  LOCATION: [facility]  DATE: 4/15/2024    INDICATION: Melanoma, elevated INR, anemia, GI bleed protocol  COMPARISON: None.  TECHNIQUE: CT angiogram abdomen pelvis during arterial phase of injection of IV contrast. 2D and 3D MIP reconstructions were performed by the CT technologist. Dose reduction techniques were used.  CONTRAST: isovue 370 90ml    FINDINGS:  ANGIOGRAM ABDOMEN/PELVIS: No evidence of aortic aneurysm or dissection. Moderate calcified and noncalcified atherosclerosis. The celiac, superior mesenteric, bilateral  renal and inferior mesenteric arteries are patent. No evidence of critical arterial   stenosis in the pelvis. No evidence of active peritoneal or retroperitoneal hemorrhage. Specifically, no evidence of active, intraluminal gastrointestinal hemorrhage.    LOWER CHEST: Postprocedural changes status post TAVR. Multiple abandoned epicardial pacing leads. No gemma airspace consolidation or pleural effusion.    HEPATOBILIARY: Likely small hepatic cysts and/or hemangiomas, no specific follow-up recommended. No biliary obstruction.    PANCREAS: Normal.    SPLEEN: Normal.    ADRENAL GLANDS: Normal.    KIDNEYS/BLADDER: Bilateral renal cysts, several of which are hemorrhagic/proteinaceous, no specific follow-up recommended. No nephroureterolithiasis or hydronephrosis. Urinary bladder is moderately distended but otherwise unremarkable.    BOWEL: No obstruction or inflammatory change. No evidence of active, intraluminal gastrointestinal hemorrhage. Normal appendix.    LYMPH NODES: No suspicious lymphadenopathy.    PELVIC ORGANS: Mild prostatomegaly. Otherwise unremarkable.    MUSCULOSKELETAL: No acute bony abnormality.      Impression    IMPRESSION:  1.  No acute abnormality in the abdomen or pelvis. Specifically, no evidence of active, intraluminal gastrointestinal hemorrhage.  2.  Moderate distention of the urinary bladder, correlate for urinary retention/outlet obstruction. No hydronephrosis.

## 2024-04-16 NOTE — CONSULTS
[facility] DIGESTIVE HEALTH CONSULTATION    Wiley Storey   [address]  [place] [address]  73 year old male  Admission Date/Time: 4/15/2024  6:25 PM    Primary Care Provider:  Stew Saravia    Requesting Physician: Ragini Balderrama MD      CHIEF COMPLAINT:   Melena    REASON FOR THE CONSULT: I was asked to see Mr. Janes Storey in consultation by Dr. Reese Ott for evaluation of melena    HPI:     Janes is a 73-year-old man with history of TAVR in February 2024, CAD status post CABG in 2012, atrial fibrillation anticoagulated with Coumadin and remote history of peptic ulcer disease over 30 years ago who presents to [facility] with melena    He states symptoms began approximately 1 week ago.  Denies any abdominal pain.  Denies any NSAID use.    Presented to the emergency department where hemoglobin was noted to be 10.7 down from 14.9 in February.  He was given pantoprazole and admitted to the hospital for further management.  No melena since arrival    Says that he had an ulcer found 30 to 40 years ago during an endoscopy in the setting of having melena and then  Lasted a colonoscopy in the [facility] system in 2016 with the finding of diverticulosis in the sigmoid colon only        REVIEW OF SYSTEMS: 13 point review of systems is negative except that noted above.    PAST MEDICAL HISTORY:   Past Medical History:   Diagnosis Date    Atrial fibrillation (H)     Maze 3/2012    CAD (coronary artery disease)     s/p CABG 3/2012-  LIMA to LAD and RSVGs to OM, D, and RPDA    GERD (gastroesophageal reflux disease)     GI bleed 1991    History of blood transfusion     Hyperlipidemia     Hypertension     NSVT (nonsustained ventricular tachycardia) (H)     Sleep apnea     uses CPAP    Sleep apnea     cpap    Thyroid disease     hyperthyroid       PAST SURGICAL HISTORY:   Past Surgical History:   Procedure Laterality Date    ANESTHESIA CARDIOVERSION  07/02/2012    Procedure: ANESTHESIA CARDIOVERSION;   Anesthesia Off site Cardioversion;  Surgeon: Provider, Generic Anesthesia;  Location: [facility] OR    BYPASS GRAFT ARTERY CORONARY  2012    Procedure:BYPASS GRAFT ARTERY CORONARY; Median Sternotomy, Coronary Artery Bypass Graft X4 Using the Left Internal Mamary and Left Saphenous Vein with MAZE Procedure, and On Pump Oxygenator.; Surgeon:MICHOACANO HOANG; Location:[facility] OR    COLONOSCOPY N/A 2016    Procedure: COLONOSCOPY;  Surgeon: Brad Peralta MD;  Location:  GI    COLONOSCOPY N/A 11/10/2016    Procedure: COLONOSCOPY;  Surgeon: Brad Peralta MD;  Location:  GI    MAZE PROCEDURE  2012    Procedure:MAZE PROCEDURE; Surgeon:MICHOACANO HOANG; Location:[facility] OR    ulcer operation  1991    ZZC TRANSCATHETER AORTIC VALVE REPLACE (TAVR/LASHAE), W/PROSTH VALVE; TRANSCAVAL APPR  2024    See Etta Admission from 2024       FAMILY HISTORY:   Family History   Problem Relation Age of Onset    Cardiovascular Unknown         aortic aneurysm, CAD    Cancer Mother 86        pancreatic    C.A.D. Mother         passed away at 86    C.A.D. Brother         stent    Diabetes Maternal Grandmother     Hypertension Brother        SOCIAL HISTORY:   Social History     Tobacco Use    Smoking status: Former     Current packs/day: 0.00     Types: Cigarettes     Quit date: 1983     Years since quittin.3     Passive exposure: Never    Smokeless tobacco: Never   Substance Use Topics    Alcohol use: Yes     Comment: occ wine        MEDS:  (Not in a hospital admission)      ALLERGIES/SENSITIVITIES: Aspirin and Ibuprofen    MEDICATIONS:  Current Outpatient Medications   Medication Sig Dispense Refill    amoxicillin (AMOXIL) 500 MG capsule Take 4 caps (2000 mg) 1 hour prior to dental procedure.      Cholecalciferol (VITAMIN D PO) Take 3,000 Units by mouth daily 1 tab daily      CRANBERRY EXTRACT PO Take 2 tablets by mouth daily      ezetimibe (ZETIA) 10 MG tablet Take 1 tablet (10 mg) by mouth daily 90 tablet 1     Multiple Vitamins-Minerals (MULTIVITAL PO) Take by mouth daily 1 TABLET DAILY      Probiotic Product (PROBIOTIC GUMMIES PO) Take 1 tablet by mouth 2 times daily      saw palmetto 450 MG CAPS capsule Take 900 mg by mouth daily      vitamin B complex with vitamin C (VITAMIN  B COMPLEX) tablet Take 1 tablet by mouth daily      vitamin B-12 (CYANOCOBALAMIN) 1000 MCG tablet Take 1,000 mcg by mouth three times a week      vitamin C (ASCORBIC ACID) 1000 MG TABS Take 500 mg by mouth daily.      warfarin ANTICOAGULANT (COUMADIN) 2 MG tablet 6mg on Thursdays and 4mg all other days or as directed by INR clinic 190 tablet 0    Blood Pressure Monitoring (B-D ASSURE BPM/AUTO ARM CUFF) MISC 1 Device daily 1 Device 0    ORDER FOR DME Use your BiPAP device as directed by your provider.         PHYSICAL EXAM:  Temp:  [97.4  F (36.3  C)-98.1  F (36.7  C)] 97.8  F (36.6  C)  Pulse:  [59-69] 68  Resp:  [16-20] 18  BP: (116-175)/(68-85) 125/68  SpO2:  [97 %-100 %] 98 %  Body mass index is 24.43 kg/m .  GEN: Well developed, well nourished 73 year old in no acute distress.  HEENT: sclera anicteric, moist mucous membranes.   LYMPH: No cervical lymphadenopathy  PULM: Nonlabored breathing. Breath sounds equal.   CARDIO: Regular rate  GI: Non-distended. Soft.  Non-tender to palpation.  No guarding. No rebound tenderness.  EXT: warm, no lower extremity edema  NEURO: Alert. No focal defects.   PSYCH: Mental status appropriate, mood and affect normal.    SKIN: No rashes  MSK: No joint abnormalities    LABORATORY DATA:  CBC RESULTS:   Recent Labs   Lab Test 04/15/24  2112 04/15/24  1857 04/15/24  1549   WBC  --   --  4.8   RBC  --   --  3.22*   HGB 9.3*   < > 10.7*   HCT  --   --  31.1*   MCV  --   --  97   MCH  --   --  33.2*   MCHC  --   --  34.4   RDW  --   --  14.1   PLT  --   --  135*    < > = values in this interval not displayed.        CMP Results:   Recent Labs   Lab Test 04/15/24  6830 04/15/24  0720   NA  --  140   POTASSIUM  --  4.4    CHLORIDE  --  108*   CO2  --  23   ANIONGAP  --  9   GLC  --  103*   BUN  --  33.6*   CR  --  1.09   BILITOTAL 0.3  --    ALKPHOS 44  --    ALT 20  --    AST 28  --           INR Results:   Recent Labs   Lab Test 04/15/24  2303   INR 2.76*          RELEVANT IMAGING:      EXAM: CTA ABDOMEN PELVIS WITH CONTRAST  LOCATION: [facility]  DATE: 4/15/2024     INDICATION: Melanoma, elevated INR, anemia, GI bleed protocol  COMPARISON: None.  TECHNIQUE: CT angiogram abdomen pelvis during arterial phase of injection of IV contrast. 2D and 3D MIP reconstructions were performed by the CT technologist. Dose reduction techniques were used.  CONTRAST: isovue 370 90ml     FINDINGS:  ANGIOGRAM ABDOMEN/PELVIS: No evidence of aortic aneurysm or dissection. Moderate calcified and noncalcified atherosclerosis. The celiac, superior mesenteric, bilateral renal and inferior mesenteric arteries are patent. No evidence of critical arterial   stenosis in the pelvis. No evidence of active peritoneal or retroperitoneal hemorrhage. Specifically, no evidence of active, intraluminal gastrointestinal hemorrhage.     LOWER CHEST: Postprocedural changes status post TAVR. Multiple abandoned epicardial pacing leads. No gemma airspace consolidation or pleural effusion.     HEPATOBILIARY: Likely small hepatic cysts and/or hemangiomas, no specific follow-up recommended. No biliary obstruction.     PANCREAS: Normal.     SPLEEN: Normal.     ADRENAL GLANDS: Normal.     KIDNEYS/BLADDER: Bilateral renal cysts, several of which are hemorrhagic/proteinaceous, no specific follow-up recommended. No nephroureterolithiasis or hydronephrosis. Urinary bladder is moderately distended but otherwise unremarkable.     BOWEL: No obstruction or inflammatory change. No evidence of active, intraluminal gastrointestinal hemorrhage. Normal appendix.     LYMPH NODES: No suspicious lymphadenopathy.     PELVIC ORGANS: Mild prostatomegaly. Otherwise  unremarkable.     MUSCULOSKELETAL: No acute bony abnormality.                                                                      IMPRESSION:  1.  No acute abnormality in the abdomen or pelvis. Specifically, no evidence of active, intraluminal gastrointestinal hemorrhage.  2.  Moderate distention of the urinary bladder, correlate for urinary retention/outlet obstruction. No hydronephrosis    ASSESSMENT:     Janes is a 73-year-old man with history of TAVR in February 2024, CAD status post CABG in 2012, atrial fibrillation anticoagulated with Coumadin and remote history of peptic ulcer disease over 30 years ago who presents to [facility] with melena    Differential diagnosis is broad though favor upper GI source of blood loss, most likely peptic ulcer disease    PLAN:    #1 melena  Plan for EGD this morning  Agree with pantoprazole 40 mg IV every 12 hours  Hemoglobin check every 12 hours is fine    #2 CAD and atrial fibrillation  Hold Coumadin for now.  He did receive 2 mg of vitamin K  INR still elevated at 1.97 but I think we should proceed with endoscopy to risk stratify and we can further reduce INR if necessary               Hugo Burnette MD  Thank you for the opportunity to participate in the care of this patient.   Please feel free to call me with any questions or concerns.  Phone number [phone].        Thank you so much for involving me in Janse's care, 60 minutes spent with direct patient care, clinical decision making, education at the bedside and care team coordination    CC: [facility]

## 2024-04-17 ENCOUNTER — ANTICOAGULATION THERAPY VISIT (OUTPATIENT)
Dept: ANTICOAGULATION | Facility: CLINIC | Age: 74
End: 2024-04-17
Payer: COMMERCIAL

## 2024-04-17 VITALS
OXYGEN SATURATION: 97 % | TEMPERATURE: 97.6 F | BODY MASS INDEX: 24.43 KG/M2 | DIASTOLIC BLOOD PRESSURE: 64 MMHG | SYSTOLIC BLOOD PRESSURE: 119 MMHG | HEART RATE: 60 BPM | RESPIRATION RATE: 16 BRPM | WEIGHT: 154 LBS

## 2024-04-17 DIAGNOSIS — Q23.81 BICUSPID AORTIC VALVE: Primary | ICD-10-CM

## 2024-04-17 DIAGNOSIS — I48.91 ATRIAL FIBRILLATION, UNSPECIFIED TYPE (H): ICD-10-CM

## 2024-04-17 DIAGNOSIS — Z95.2 HISTORY OF TRANSCATHETER AORTIC VALVE IMPLANTATION (TAVI): ICD-10-CM

## 2024-04-17 DIAGNOSIS — D69.6 THROMBOCYTOPENIA (H): ICD-10-CM

## 2024-04-17 DIAGNOSIS — Z95.1 HISTORY OF CORONARY ARTERY BYPASS SURGERY: ICD-10-CM

## 2024-04-17 LAB
ANION GAP SERPL CALCULATED.3IONS-SCNC: 9 MMOL/L (ref 7–15)
BUN SERPL-MCNC: 24 MG/DL (ref 8–23)
CALCIUM SERPL-MCNC: 8.4 MG/DL (ref 8.8–10.2)
CHLORIDE SERPL-SCNC: 108 MMOL/L (ref 98–107)
CREAT SERPL-MCNC: 1.1 MG/DL (ref 0.67–1.17)
DEPRECATED HCO3 PLAS-SCNC: 23 MMOL/L (ref 22–29)
EGFRCR SERPLBLD CKD-EPI 2021: 70 ML/MIN/1.73M2
GLUCOSE SERPL-MCNC: 94 MG/DL (ref 70–99)
HGB BLD-MCNC: 8.7 G/DL (ref 13.3–17.7)
PATH REPORT.COMMENTS IMP SPEC: NORMAL
PATH REPORT.FINAL DX SPEC: NORMAL
PATH REPORT.GROSS SPEC: NORMAL
PATH REPORT.MICROSCOPIC SPEC OTHER STN: NORMAL
PATH REPORT.RELEVANT HX SPEC: NORMAL
PHOTO IMAGE: NORMAL
POTASSIUM SERPL-SCNC: 3.7 MMOL/L (ref 3.4–5.3)
SODIUM SERPL-SCNC: 140 MMOL/L (ref 135–145)

## 2024-04-17 PROCEDURE — 96376 TX/PRO/DX INJ SAME DRUG ADON: CPT

## 2024-04-17 PROCEDURE — 88305 TISSUE EXAM BY PATHOLOGIST: CPT | Mod: 26 | Performed by: PATHOLOGY

## 2024-04-17 PROCEDURE — 250N000011 HC RX IP 250 OP 636: Performed by: HOSPITALIST

## 2024-04-17 PROCEDURE — 99207 PR APP CREDIT; MD BILLING SHARED VISIT: CPT | Performed by: FAMILY MEDICINE

## 2024-04-17 PROCEDURE — C9113 INJ PANTOPRAZOLE SODIUM, VIA: HCPCS | Performed by: HOSPITALIST

## 2024-04-17 PROCEDURE — 36415 COLL VENOUS BLD VENIPUNCTURE: CPT | Performed by: HOSPITALIST

## 2024-04-17 PROCEDURE — 80048 BASIC METABOLIC PNL TOTAL CA: CPT | Performed by: HOSPITALIST

## 2024-04-17 PROCEDURE — 250N000013 HC RX MED GY IP 250 OP 250 PS 637: Performed by: HOSPITALIST

## 2024-04-17 PROCEDURE — 85018 HEMOGLOBIN: CPT | Performed by: INTERNAL MEDICINE

## 2024-04-17 PROCEDURE — 99239 HOSP IP/OBS DSCHRG MGMT >30: CPT | Mod: FS

## 2024-04-17 PROCEDURE — 88342 IMHCHEM/IMCYTCHM 1ST ANTB: CPT | Mod: 26 | Performed by: PATHOLOGY

## 2024-04-17 PROCEDURE — G0378 HOSPITAL OBSERVATION PER HR: HCPCS

## 2024-04-17 PROCEDURE — 999N000157 HC STATISTIC RCP TIME EA 10 MIN

## 2024-04-17 RX ORDER — ASPIRIN 81 MG/1
81 TABLET, CHEWABLE ORAL DAILY
Qty: 30 TABLET | Refills: 0 | Status: SHIPPED | OUTPATIENT
Start: 2024-04-17 | End: 2024-05-17

## 2024-04-17 RX ORDER — OMEPRAZOLE 40 MG/1
40 CAPSULE, DELAYED RELEASE ORAL 2 TIMES DAILY
Qty: 120 CAPSULE | Refills: 0 | Status: SHIPPED | OUTPATIENT
Start: 2024-04-17 | End: 2024-06-16

## 2024-04-17 RX ADMIN — EZETIMIBE 10 MG: 10 TABLET ORAL at 08:40

## 2024-04-17 RX ADMIN — PANTOPRAZOLE SODIUM 40 MG: 40 INJECTION, POWDER, FOR SOLUTION INTRAVENOUS at 08:40

## 2024-04-17 ASSESSMENT — ACTIVITIES OF DAILY LIVING (ADL)
ADLS_ACUITY_SCORE: 24
ADLS_ACUITY_SCORE: 29
ADLS_ACUITY_SCORE: 24
ADLS_ACUITY_SCORE: 29
ADLS_ACUITY_SCORE: 29
ADLS_ACUITY_SCORE: 33
ADLS_ACUITY_SCORE: 24
ADLS_ACUITY_SCORE: 33
ADLS_ACUITY_SCORE: 33
ADLS_ACUITY_SCORE: 24
ADLS_ACUITY_SCORE: 26

## 2024-04-17 NOTE — PLAN OF CARE
Problem: Pain Acute  Goal: Optimal Pain Control and Function  Outcome: Progressing  Intervention: Prevent or Manage Pain  Recent Flowsheet Documentation  Taken 4/17/2024 0404 by Rosanne Terry RN  Medication Review/Management: medications reviewed  Taken 4/17/2024 0017 by Rosanne Terry RN  Medication Review/Management: medications reviewed     Problem: Adult Inpatient Plan of Care  Goal: Optimal Comfort and Wellbeing  Outcome: Progressing     Problem: Adult Inpatient Plan of Care  Goal: Absence of Hospital-Acquired Illness or Injury  Intervention: Prevent Skin Injury  Recent Flowsheet Documentation  Taken 4/17/2024 0404 by Rosnane Terry RN  Body Position: position changed independently  Taken 4/17/2024 0017 by Rosanne Terry RN  Body Position: position changed independently   Goal Outcome Evaluation:       Patient admitted for dark stool/ Anticoagulated on warfarin. No BM noted during the night. A&OX4. RA. PIVs saline locked. Ambulated to the bathroom throughout the night. Denies pain, and slept well.

## 2024-04-17 NOTE — PROGRESS NOTES
Patient discharged to home at 1324 via Private Car.  Accompanied by self and staff.  Discharge instructions were reviewed with patient, opportunity offered to ask questions.    Prescriptions e-prescribed to pharmacy.  Access discontinued: Yes  Care plan and education discontinued: Yes  Belongings were sent home with patient/family:  Cell phone/electronics: cell phone and Clothing: Shirt(s):   and Pants:   .

## 2024-04-17 NOTE — PROGRESS NOTES
"PRIMARY DIAGNOSIS: \"GENERIC\" NURSING  OUTPATIENT/OBSERVATION GOALS TO BE MET BEFORE DISCHARGE:  ADLs back to baseline: Yes    Activity and level of assistance: Ambulating independently.    Pain status: Pain free.    Return to near baseline physical activity: Yes     Discharge Planner Nurse   Safe discharge environment identified: Yes  Barriers to discharge: Yes       Entered by: Jose Rafael Ram RN 04/17/2024 12:49 AM     A & O x 4. VSS on RA. Denies pain, still experiencing abdominal discomfort (reported as \"gas\"). R PIV x 2, both SL. Regular diet, tolerating well. Up independently to bathroom. 1x dark colored, formed BM. Anticipate discharge tomorrow. Nursing continue to monitor.  "

## 2024-04-17 NOTE — PLAN OF CARE
PRIMARY DIAGNOSIS: ACUTE PAIN  OUTPATIENT/OBSERVATION GOALS TO BE MET BEFORE DISCHARGE:  1. Pain Status: Pain free.    2. Return to near baseline physical activity: Yes    3. Cleared for discharge by consultants (if involved): Yes    Discharge Planner Nurse   Safe discharge environment identified: Yes  Barriers to discharge: No       Entered by: Rosanne Terry RN 04/17/2024 2:44 AM     Please review provider order for any additional goals.   Nurse to notify provider when observation goals have been met and patient is ready for discharge.Goal Outcome Evaluation:

## 2024-04-17 NOTE — PROGRESS NOTES
GI PROGRESS NOTE  4/17/2024  Wiley Storey  1950  [id number]    Subjective:   Feeling well, tolerating diet advancement.  Reports dark stool, however reports this was more formed.  Overall feeling well and excited to go home.     Objective:     Blood pressure 119/64, pulse 60, temperature 97.6  F (36.4  C), temperature source Oral, resp. rate 16, weight 69.9 kg (154 lb), SpO2 97%.    Body mass index is 24.43 kg/m .  Gen: NAD  Cardio: RRR  GI: Non-distended, BS positive, soft, non-tender  Neuro: Alert and orientated  Skin: No jaundice     Laboratory:  BMP  Recent Labs   Lab 04/17/24  0648 04/16/24  0911 04/16/24  0818 04/15/24  1857     --  140 140   POTASSIUM 3.7  --  3.7 4.4   CHLORIDE 108*  --  108* 108*   FLOWER 8.4*  --  8.2* 8.6*   CO2 23  --  24 23   BUN 24.0*  --  29.5* 33.6*   CR 1.10  --  1.01 1.09   GLC 94 95 93 103*     CBC  Recent Labs   Lab 04/17/24  0648 04/16/24  1425 04/16/24  0816 04/15/24  1857 04/15/24  1549   WBC  --   --   --   --  4.8   RBC  --   --   --   --  3.22*   HGB 8.7* 9.2* 9.0*   < > 10.7*   HCT  --   --   --   --  31.1*   MCV  --   --   --   --  97   MCH  --   --   --   --  33.2*   MCHC  --   --   --   --  34.4   RDW  --   --   --   --  14.1   PLT  --   --   --   --  135*    < > = values in this interval not displayed.     INR  Recent Labs   Lab 04/16/24  1425 04/16/24  0728 04/15/24  2303   INR 1.70* 1.97* 2.76*      LFTs  Recent Labs   Lab Test 04/15/24  2303 11/19/17  1442   ALBUMIN 3.6  --    BILITOTAL 0.3  --    ALT 20  --    AST 28  --    PROTEIN  --  30*     Imaging:  EXAM: CTA ABDOMEN PELVIS WITH CONTRAST  LOCATION: [facility]  DATE: 4/15/2024                                                      IMPRESSION:  1.  No acute abnormality in the abdomen or pelvis. Specifically, no evidence of active, intraluminal gastrointestinal hemorrhage.  2.  Moderate distention of the urinary bladder, correlate for urinary retention/outlet obstruction.  No hydronephrosis    Procedures:  EGD 4/16/24:  Impression:            - Normal esophagus.                          - Normal stomach. Biopsied.                          - Non-bleeding duodenal ulcer with a clean ulcer base                          (Arcadio Class III).      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  EGD yesterday showed nonbleeding duodenal ulcer.  Hemoglobin has been stable at 8.7.  Melena has improved.  Recommend twice daily PPI for 8 weeks, followed by once daily dosing indefinitely.  H. pylori biopsies are pending.    Patient had several questions regarding the omeprazole medication risks and benefits, which I answered today.  He was given our phone number should he have any further questions.     Plan:   1. Regular diet  2. Ok to resume anticoagulation, low risk of rebleed  3. Twice daily PPI for 8 weeks, then daily in the AM indefinitely. Pt instructed to take 30 min before meals on empty stomach.   4. We will follow up pathology for h pylori.   5. OK from our standpoint for d/c home       24 minutes of total time was spent providing patient care, including patient evaluation, reviewing documentation/test results, , and documentation.                                                                        Yessica Baer PA-C  Thank you for the opportunity to participate in the care of this patient.   Please feel free to call me with any questions or concerns.  Phone number [phone].

## 2024-04-17 NOTE — DISCHARGE SUMMARY
[facility]  Hospitalist Discharge Summary      Date of Admission:  4/15/2024  Date of Discharge:  4/17/2024  Discharging Provider: Elissa Patrick NP  Discharge Service: Hospitalist Service    Discharge Diagnoses   Rectal Bleeding  Non bleeding duodenal ulcer  Biopsy for H. Pylori  Anemia  Recent TAVR on anticoagulation Coumadin stopped    Clinically Significant Risk Factors          Follow-ups Needed After Discharge   Follow-up Appointments     Follow-up and recommended labs and tests       Follow up with primary care provider, Stew Saravia, within 7 days for   hospital follow- up.  The following labs/tests are recommended: Recheck   Hgb in 5-7 day and follow up with primary care provider.    Follow up with [facility] outpatient          Discharge Disposition   Discharged to home  Condition at discharge: Stable    Hospital Course      Wiley Storey is a 73 year old male admitted on 4/15/2024. He presented with melanotic stool while on warfarin after a recent TAVR. Patient states he fells well, no further bleeding.   Patient states he agrees with plan to discharge to home and follow up with GI outpatient.     GI bleed, unclear if upper or lower  Acute blood loss anemia  -with multiple melanotic stools per day  -hemoglobin dropped from almost 11 to low 9's over the course of today  -INR 2.4  -hemodynamically stable so far  -will admit to the hospital  -PPI BID x 8 weeks, then daily  -consult GI: EGD today  EGD results:non bleeding duodenal ulcer. Biopsy for H pylori  -HGB stable today 8.7, down from 9.2 yesterday,   Tolerating advancing diet without difficulty  Stop Coumadin, start Asa 81 mg - per Becker recommendations      Recent TAVR on long term anticoagulation with warfarin  -awaiting cardiology's recommendations for possible warfarin reversal  -monitor  Stop Coumadin, start Asa 81 mg - per Becker recommendations    Coronary artery disease  Previous CABG  Hyperlipidemia  -continue  ezetimibe    Consultations This Hospital Stay   GASTROENTEROLOGY IP CONSULT  CARDIOLOGY IP CONSULT  CARE MANAGEMENT / SOCIAL WORK IP CONSULT    Code Status   Full Code    Time Spent on this Encounter   I, Elissa Patrick NP, personally saw the patient today and spent greater than 30 minutes discharging this patient.       Elissa Patrick NP  [facility] EXTENDED RECOVERY AND SHORT STAY  [address] [place] [id number]  Phone: [phone]  Fax: [phone]  ______________________________________________________________________    Physical Exam   Vital Signs: Temp: 97.6  F (36.4  C) Temp src: Oral BP: 119/64 Pulse: 60   Resp: 16 SpO2: 97 % O2 Device: None (Room air)    Weight: 154 lbs 0 oz  Constitutional: awake, alert, cooperative, no apparent distress, and appears stated age  Hematologic / Lymphatic: no cervical lymphadenopathy and no supraclavicular lymphadenopathy  Respiratory: No increased work of breathing, good air exchange, clear to auscultation bilaterally, no crackles or wheezing  Cardiovascular: Normal apical impulse, regular rate and rhythm, normal S1 and S2, no S3 or S4, and no murmur noted  GI: No scars, normal bowel sounds, soft, non-distended, non-tender, no masses palpated, no hepatosplenomegally  Skin: no bruising or bleeding, normal skin color, texture, turgor, no redness, warmth, or swelling, no rashes, and no lesions  Musculoskeletal: There is no redness, warmth, or swelling of the joints.  Full range of motion noted.  Motor strength is 5 out of 5 all extremities bilaterally.    Neurologic: Awake, alert, oriented to name, place and time.   Neuropsychiatric: General: normal, calm, and normal eye contact       Primary Care Physician   Stew Saravia    Discharge Orders      Primary Care - Care Coordination Referral      Reason for your hospital stay    Acute GI Bleed  Anemia     Follow-up and recommended labs and tests     Follow up with primary care provider, Stew  Rani, within 7 days for hospital follow- up.  The following labs/tests are recommended: Recheck Hgb in 5-7 day and follow up with primary care provider.    Follow up with [facility] outpatient     Activity    Your activity upon discharge: activity as tolerated     Diet    Follow this diet upon discharge: Orders Placed This Encounter      Regular Diet Adult       Significant Results and Procedures   Most Recent 3 CBC's:  Recent Labs   Lab Test 04/17/24  0648 04/16/24  1425 04/16/24  0816 04/15/24  1857 04/15/24  1549 02/05/24  0534 02/05/24  0534 05/11/16  0859   WBC  --   --   --   --  4.8  --  5.1 4.4   HGB 8.7* 9.2* 9.0*   < > 10.7*   < > 14.9 16.4   MCV  --   --   --   --  97  --  95 97   PLT  --   --   --   --  135*  --  95* 121*    < > = values in this interval not displayed.     Most Recent 3 BMP's:  Recent Labs   Lab Test 04/17/24  0648 04/16/24  0911 04/16/24  0818 04/15/24  1857     --  140 140   POTASSIUM 3.7  --  3.7 4.4   CHLORIDE 108*  --  108* 108*   CO2 23  --  24 23   BUN 24.0*  --  29.5* 33.6*   CR 1.10  --  1.01 1.09   ANIONGAP 9  --  8 9   FLOWER 8.4*  --  8.2* 8.6*   GLC 94 95 93 103*   ,   Results for orders placed or performed during the hospital encounter of 04/15/24   CTA Abdomen Pelvis with Contrast    Narrative    EXAM: CTA ABDOMEN PELVIS WITH CONTRAST  LOCATION: [facility]  DATE: 4/15/2024    INDICATION: Melanoma, elevated INR, anemia, GI bleed protocol  COMPARISON: None.  TECHNIQUE: CT angiogram abdomen pelvis during arterial phase of injection of IV contrast. 2D and 3D MIP reconstructions were performed by the CT technologist. Dose reduction techniques were used.  CONTRAST: isovue 370 90ml    FINDINGS:  ANGIOGRAM ABDOMEN/PELVIS: No evidence of aortic aneurysm or dissection. Moderate calcified and noncalcified atherosclerosis. The celiac, superior mesenteric, bilateral renal and inferior mesenteric arteries are patent. No evidence of critical arterial   stenosis in  the pelvis. No evidence of active peritoneal or retroperitoneal hemorrhage. Specifically, no evidence of active, intraluminal gastrointestinal hemorrhage.    LOWER CHEST: Postprocedural changes status post TAVR. Multiple abandoned epicardial pacing leads. No gemma airspace consolidation or pleural effusion.    HEPATOBILIARY: Likely small hepatic cysts and/or hemangiomas, no specific follow-up recommended. No biliary obstruction.    PANCREAS: Normal.    SPLEEN: Normal.    ADRENAL GLANDS: Normal.    KIDNEYS/BLADDER: Bilateral renal cysts, several of which are hemorrhagic/proteinaceous, no specific follow-up recommended. No nephroureterolithiasis or hydronephrosis. Urinary bladder is moderately distended but otherwise unremarkable.    BOWEL: No obstruction or inflammatory change. No evidence of active, intraluminal gastrointestinal hemorrhage. Normal appendix.    LYMPH NODES: No suspicious lymphadenopathy.    PELVIC ORGANS: Mild prostatomegaly. Otherwise unremarkable.    MUSCULOSKELETAL: No acute bony abnormality.      Impression    IMPRESSION:  1.  No acute abnormality in the abdomen or pelvis. Specifically, no evidence of active, intraluminal gastrointestinal hemorrhage.  2.  Moderate distention of the urinary bladder, correlate for urinary retention/outlet obstruction. No hydronephrosis.       Discharge Medications   Current Discharge Medication List        START taking these medications    Details   aspirin (ASA) 81 MG chewable tablet Take 1 tablet (81 mg) by mouth daily for 30 days  Qty: 30 tablet, Refills: 0    Associated Diagnoses: Atrial fibrillation, unspecified type (H)      omeprazole (PRILOSEC) 40 MG DR capsule Take 1 capsule (40 mg) by mouth 2 times daily for 60 days  Qty: 120 capsule, Refills: 0    Associated Diagnoses: Peptic ulcer without hemorrhage or perforation           CONTINUE these medications which have NOT CHANGED    Details   amoxicillin (AMOXIL) 500 MG capsule Take 4 caps (2000 mg) 1 hour  prior to dental procedure.      Cholecalciferol (VITAMIN D PO) Take 3,000 Units by mouth daily 1 tab daily      CRANBERRY EXTRACT PO Take 2 tablets by mouth daily      ezetimibe (ZETIA) 10 MG tablet Take 1 tablet (10 mg) by mouth daily  Qty: 90 tablet, Refills: 1    Comments: Please call patient when ready.  Associated Diagnoses: History of coronary artery bypass surgery; Hyperlipidemia LDL goal <70      Multiple Vitamins-Minerals (MULTIVITAL PO) Take by mouth daily 1 TABLET DAILY      Probiotic Product (PROBIOTIC GUMMIES PO) Take 1 tablet by mouth 2 times daily      saw palmetto 450 MG CAPS capsule Take 900 mg by mouth daily      vitamin B complex with vitamin C (VITAMIN  B COMPLEX) tablet Take 1 tablet by mouth daily      vitamin B-12 (CYANOCOBALAMIN) 1000 MCG tablet Take 1,000 mcg by mouth three times a week      vitamin C (ASCORBIC ACID) 1000 MG TABS Take 500 mg by mouth daily.      Blood Pressure Monitoring (B-D ASSURE BPM/AUTO ARM CUFF) MISC 1 Device daily  Qty: 1 Device, Refills: 0    Associated Diagnoses: Intermediate coronary syndrome (H); ASCVD (arteriosclerotic cardiovascular disease); Hypertension goal BP (blood pressure) < 140/90      ORDER FOR DME Use your BiPAP device as directed by your provider.           STOP taking these medications       warfarin ANTICOAGULANT (COUMADIN) 2 MG tablet Comments:   Reason for Stopping:             Allergies   Allergies   Allergen Reactions    Aspirin      Early 1990s, took one dose of aspirin + ibuprofen and had severe GI bleed - hospitalization required    Ibuprofen      Early 1990s, took one dose of aspirin + ibuprofen and had severe GI bleed -hospitalization required

## 2024-04-17 NOTE — PROGRESS NOTES
"PRIMARY DIAGNOSIS: \"GENERIC\" NURSING  OUTPATIENT/OBSERVATION GOALS TO BE MET BEFORE DISCHARGE:  ADLs back to baseline: Yes    Activity and level of assistance: Ambulating independently.    Pain status: Pain free.    Return to near baseline physical activity: Yes     Discharge Planner Nurse   Safe discharge environment identified: Yes  Barriers to discharge: Yes         "

## 2024-04-17 NOTE — PROGRESS NOTES
Care Management Discharge Note    Discharge Date: 04/17/2024       Discharge Disposition: Home    Discharge Services: None    Discharge DME: None    Discharge Transportation: family or friend will provide    Private pay costs discussed: Not applicable    Education Provided on the Discharge Plan: Yes  Persons Notified of Discharge Plans: yes  Patient/Family in Agreement with the Plan: yes    Handoff Referral Completed: Yes    Additional Information:  No needs anticipated from CM at discharge per pt; independent at baseline. Pt to follow up with PCP post discharge if needs arise. Family to transport home.  10:22 AM    Brenna Kjellberg, BSW LSW  4/17/2024

## 2024-04-17 NOTE — PROGRESS NOTES
"PRIMARY DIAGNOSIS: \"GENERIC\" NURSING  OUTPATIENT/OBSERVATION GOALS TO BE MET BEFORE DISCHARGE:  ADLs back to baseline: Yes    Activity and level of assistance: Ambulating independently.    Pain status: Pain free.    Return to near baseline physical activity: Yes     Discharge Planner Nurse   Safe discharge environment identified: Yes  Barriers to discharge: Yes       Entered by: Julia Verdugo RN 04/17/2024 11:20 AM   Pt A&O x 4, independent to bathroom. Denies pain or discomfort. Medium dark formed stool x 1.   Please review provider order for any additional goals.   Nurse to notify provider when observation goals have been met and patient is ready for discharge.  "

## 2024-04-17 NOTE — PROGRESS NOTES
ANTICOAGULATION  MANAGEMENT: Discharge Review    Wiley Storey chart reviewed for anticoagulation continuity of care    Hospital Admission on 4/15/24-4/17/24 for gastrointestinal hemorrhage with melena, duodenal ulcer.    Discharge disposition: Home    Results:    Recent labs: (last 7 days)     04/15/24  1549 04/15/24  2303 04/16/24  0728 04/16/24  1425   INR 2.4* 2.76* 1.97* 1.70*     Anticoagulation inpatient management:     Held Warfarin and reversed with 2 mg Vitamin K on4/15/24    Anticoagulation discharge instructions:     Warfarin dosing: warfarin discontinued    Medication changes affecting anticoagulation: Yes: Stop Warfarin, Start ASA and Omeprazole    Additional factors affecting anticoagulation: No     PLAN     Discharge from [facility]    Patient not contacted    Reason for discharge: warfarin therapy completed    Anticoagulation episode resolved, [facility] referral closed, and Standing order discontinued    If patient needs warfarin management in the future, please send a new referral    Kiana Vallecillo RN

## 2024-04-22 ENCOUNTER — PATIENT OUTREACH (OUTPATIENT)
Dept: CARE COORDINATION | Facility: CLINIC | Age: 74
End: 2024-04-22
Payer: COMMERCIAL

## 2024-04-22 NOTE — PROGRESS NOTES
Clinic SW Care Coordination Contact  [facility]: Post-Discharge Note  SITUATION                                                      Admission:    Admission Date: 04/15/24   Reason for Admission: GI Bleed  Discharge:   Discharge Date: 04/17/24  Discharge Diagnosis: Rectal Bleeding  Non bleeding duodenal ulcer  Biopsy for H. Pylori  Anemia  Recent TAVR on anticoagulation Coumadin stopped    BACKGROUND                                                      Per hospital discharge summary and inpatient provider notes:    Lives with wife who assists as needed.    ASRectal Bleeding  Non bleeding duodenal ulcer  Biopsy for H. Pylori  Anemia  Recent TAVR on anticoagulation Coumadin stoppedSESSMENT           Discharge Assessment  How are you doing now that you are home?: ok , low hgb, gets tired.  How are your symptoms? (Red Flag symptoms escalate to triage hotline per guidelines): Improved  Do you feel your condition is stable enough to be safe at home until your provider visit?: Yes  Does the patient have their discharge instructions? : Yes  Does the patient have questions regarding their discharge instructions? : No  Were you started on any new medications or were there changes to any of your previous medications? : Yes  Does the patient have all of their medications?: Yes  Do you have questions regarding any of your medications? : No  Do you have all of your needed medical supplies or equipment (DME)?  (i.e. oxygen tank, CPAP, cane, etc.): Yes  Discharge follow-up appointment scheduled within 14 calendar days? : Yes  Discharge Follow Up Appointment Date: 04/25/24  Discharge Follow Up Appointment Scheduled with?: Primary Care Provider       Spoke with patient,.  introduced Patient to Care Coordination.  At this time he feels he has no unmet needs. Declined Care Coordination       PLAN                                                      Outpatient Plan:  Patient will follow up with PCP     Future Appointments   Date  Time Provider Department Center   4/25/2024  5:00 PM Stew Saravia MD [facility]   5/7/2024 11:00 AM Goltz, Bennett Ezra, PA-C [facility]   5/20/2024  2:00 PM Stew Saravia MD [facility]         For any urgent concerns, please contact our 24 hour nurse triage line: [phone] ([phone])           SUSAN Addison / [facility]   Care Coordination  [facility]  4/22/2024 9:17 AM

## 2024-04-24 NOTE — TELEPHONE ENCOUNTER
Will do. Thank you for the message.     Stew Saravia MD  [facility] [place] [address]  Phone: [phone]  Fax: [phone]    4/24/2024  1:48 PM   normal (ped)...

## 2024-04-29 ENCOUNTER — OFFICE VISIT (OUTPATIENT)
Dept: FAMILY MEDICINE | Facility: CLINIC | Age: 74
End: 2024-04-29
Payer: COMMERCIAL

## 2024-04-29 VITALS
DIASTOLIC BLOOD PRESSURE: 88 MMHG | HEART RATE: 56 BPM | RESPIRATION RATE: 18 BRPM | WEIGHT: 159.12 LBS | TEMPERATURE: 97.7 F | HEIGHT: 66 IN | OXYGEN SATURATION: 100 % | BODY MASS INDEX: 25.57 KG/M2 | SYSTOLIC BLOOD PRESSURE: 122 MMHG

## 2024-04-29 DIAGNOSIS — Z87.19 HISTORY OF GI BLEED: ICD-10-CM

## 2024-04-29 DIAGNOSIS — N28.9 RENAL LESION: ICD-10-CM

## 2024-04-29 DIAGNOSIS — K92.1 GASTROINTESTINAL HEMORRHAGE WITH MELENA: ICD-10-CM

## 2024-04-29 DIAGNOSIS — I48.91 ATRIAL FIBRILLATION, UNSPECIFIED TYPE (H): ICD-10-CM

## 2024-04-29 DIAGNOSIS — Z95.1 HISTORY OF CORONARY ARTERY BYPASS SURGERY: ICD-10-CM

## 2024-04-29 DIAGNOSIS — N18.2 STAGE 2 CHRONIC KIDNEY DISEASE: ICD-10-CM

## 2024-04-29 DIAGNOSIS — E78.5 HYPERLIPIDEMIA LDL GOAL <70: ICD-10-CM

## 2024-04-29 DIAGNOSIS — K86.9 LESION OF PANCREAS: ICD-10-CM

## 2024-04-29 DIAGNOSIS — Z95.2 S/P TAVR (TRANSCATHETER AORTIC VALVE REPLACEMENT): ICD-10-CM

## 2024-04-29 DIAGNOSIS — Z09 HOSPITAL DISCHARGE FOLLOW-UP: Primary | ICD-10-CM

## 2024-04-29 DIAGNOSIS — I50.32 CHRONIC DIASTOLIC (CONGESTIVE) HEART FAILURE (H): ICD-10-CM

## 2024-04-29 DIAGNOSIS — D69.6 THROMBOCYTOPENIA (H): ICD-10-CM

## 2024-04-29 PROBLEM — I35.0 MILD AORTIC STENOSIS: Chronic | Status: RESOLVED | Noted: 2019-07-11 | Resolved: 2024-04-29

## 2024-04-29 PROBLEM — Z79.01 ANTICOAGULATED ON WARFARIN: Status: RESOLVED | Noted: 2024-04-15 | Resolved: 2024-04-29

## 2024-04-29 PROBLEM — Z79.01 WARFARIN ANTICOAGULATION: Status: RESOLVED | Noted: 2024-02-05 | Resolved: 2024-04-29

## 2024-04-29 LAB
BASOPHILS # BLD AUTO: 0 10E3/UL (ref 0–0.2)
BASOPHILS NFR BLD AUTO: 1 %
EOSINOPHIL # BLD AUTO: 0.1 10E3/UL (ref 0–0.7)
EOSINOPHIL NFR BLD AUTO: 2 %
ERYTHROCYTE [DISTWIDTH] IN BLOOD BY AUTOMATED COUNT: 13.8 % (ref 10–15)
HCT VFR BLD AUTO: 30.7 % (ref 40–53)
HGB BLD-MCNC: 10.1 G/DL (ref 13.3–17.7)
HOLD SPECIMEN: NORMAL
IMM GRANULOCYTES # BLD: 0 10E3/UL
IMM GRANULOCYTES NFR BLD: 0 %
LYMPHOCYTES # BLD AUTO: 0.9 10E3/UL (ref 0.8–5.3)
LYMPHOCYTES NFR BLD AUTO: 19 %
MCH RBC QN AUTO: 31.9 PG (ref 26.5–33)
MCHC RBC AUTO-ENTMCNC: 32.9 G/DL (ref 31.5–36.5)
MCV RBC AUTO: 97 FL (ref 78–100)
MONOCYTES # BLD AUTO: 0.4 10E3/UL (ref 0–1.3)
MONOCYTES NFR BLD AUTO: 8 %
NEUTROPHILS # BLD AUTO: 3.2 10E3/UL (ref 1.6–8.3)
NEUTROPHILS NFR BLD AUTO: 70 %
PLATELET # BLD AUTO: 167 10E3/UL (ref 150–450)
RBC # BLD AUTO: 3.17 10E6/UL (ref 4.4–5.9)
WBC # BLD AUTO: 4.6 10E3/UL (ref 4–11)

## 2024-04-29 PROCEDURE — 36415 COLL VENOUS BLD VENIPUNCTURE: CPT | Performed by: FAMILY MEDICINE

## 2024-04-29 PROCEDURE — 82043 UR ALBUMIN QUANTITATIVE: CPT | Performed by: FAMILY MEDICINE

## 2024-04-29 PROCEDURE — 99215 OFFICE O/P EST HI 40 MIN: CPT | Performed by: FAMILY MEDICINE

## 2024-04-29 PROCEDURE — 80048 BASIC METABOLIC PNL TOTAL CA: CPT | Performed by: FAMILY MEDICINE

## 2024-04-29 PROCEDURE — 85025 COMPLETE CBC W/AUTO DIFF WBC: CPT | Performed by: FAMILY MEDICINE

## 2024-04-29 PROCEDURE — 80061 LIPID PANEL: CPT | Performed by: FAMILY MEDICINE

## 2024-04-29 PROCEDURE — 82570 ASSAY OF URINE CREATININE: CPT | Performed by: FAMILY MEDICINE

## 2024-04-29 RX ORDER — EZETIMIBE 10 MG/1
10 TABLET ORAL DAILY
Qty: 90 TABLET | Refills: 1 | Status: SHIPPED | OUTPATIENT
Start: 2024-04-29

## 2024-04-29 RX ORDER — AMOXICILLIN 500 MG/1
CAPSULE ORAL
Status: CANCELLED | OUTPATIENT
Start: 2024-04-29

## 2024-04-29 RX ORDER — RESPIRATORY SYNCYTIAL VIRUS VACCINE 120MCG/0.5
0.5 KIT INTRAMUSCULAR ONCE
Qty: 1 EACH | Refills: 0 | Status: CANCELLED | OUTPATIENT
Start: 2024-04-29 | End: 2024-04-29

## 2024-04-29 NOTE — PATIENT INSTRUCTIONS
-Thank you for choosing the [facility].  -It was a pleasure to see you today.  -Please take a look at the information below for more specific details regarding the treatment plan and recommendations.  -In this after visit summary is a list of your medications and specific instructions.  Please review this carefully as there may be changes made to your medication list.  -If there are any particular questions or concerns, please feel free to reach out to Dr. Saravia.  -If any labs have been completed, we will reach out to you about results.  If the results are normal or not concerning, a letter or [employer] message will be sent to you.  If any follow-up is needed, either Dr. Saravia or the nurse will give you a call.  If you have not heard regarding results after 2 weeks, please reach out to the clinic.    Patient Instructions:    -Follow-up with gastroenterology (about late May or early June 2024) as recommended from the hospital team.  -Continue taking the omeprazole twice daily as prescribed.   -A new referral has been placed for you to see the gastroenterology team at [facility].  -If you do not hear from the specialist to schedule an appointment within a week's time from today, please call the [facility] and speak with the specialty  to help you schedule the appointment to see the specialist.  Depending on the specialist availability, it may be a number of weeks prior to your scheduled appointment.    -Continue to follow with the heart team at [facility].   -Follow the recommendations as given to you from the heart team.    -Dr. Saravia will call to talk with the radiologist regarding the pancreas and kidney findings and determine the next best steps for further imaging. Once determined, Dr. Saravia or the nurse will reach out to you to review the recommendations.       Please seek immediate medical attention (go to the emergency room or urgent care) for the following reasons: worsening  symptoms, or any concerning changes.      --------------------------------------------------------------------------------------------------------------------    -We are always looking for ways to improve.  You may be selected to receive a survey regarding your visit today.  We encourage you to complete the survey and provide specific, constructive feedback to help us improve our processes.  Thank you for your time!  -Please review the contact information listed on the after visit summary and in the electronic chart.  Below is the phone number that we have on file.  If there are any changes that are needed to be made, please reach out to the clinic.  [phone] (home)

## 2024-04-29 NOTE — PROGRESS NOTES
OFFICE VISIT    Assessment/Plan:     Patient Instructions:    -Follow-up with gastroenterology (about late May or early June 2024) as recommended from the hospital team.  -Continue taking the omeprazole twice daily as prescribed.   -A new referral has been placed for you to see the gastroenterology team at [facility].  -If you do not hear from the specialist to schedule an appointment within a week's time from today, please call the [facility] and speak with the specialty  to help you schedule the appointment to see the specialist.  Depending on the specialist availability, it may be a number of weeks prior to your scheduled appointment.    -Continue to follow with the heart team at [place].   -Follow the recommendations as given to you from the heart team.    -Dr. Saravia will call to talk with the radiologist regarding the pancreas and kidney findings and determine the next best steps for further imaging. Once determined, Dr. Saravia or the nurse will reach out to you to review the recommendations.      Please seek immediate medical attention (go to the emergency room or urgent care) for the following reasons: worsening symptoms, or any concerning changes.        Wiley was seen today for hospital f/u and recheck medication.  Diagnoses and all orders for this visit:    Hospital discharge follow-up  Gastrointestinal hemorrhage with melena  History of GI bleed  S/P TAVR (transcatheter aortic valve replacement)  Stage 2 chronic kidney disease: reports doing well now. Off warfarin and will be on ASA 81 mg going forward. Encouraged to continue PPI and see GI.   -     Albumin Random Urine Quantitative with Creat Ratio; Future  -     Basic metabolic panel; Future  -     CBC with Platelets & Differential; Future  -     Adult GI  Referral - Consult Only; Future    Atrial fibrillation, unspecified type (H)  -     Adult GI  Referral - Consult Only; Future    History of coronary artery bypass  surgery  Hyperlipidemia LDL goal <70  Chronic diastolic (congestive) heart failure (H)  Thrombocytopenia (H24):   -     Lipid panel reflex to direct LDL Non-fasting; Future  -     ezetimibe (ZETIA) 10 MG tablet; Take 1 tablet (10 mg) by mouth daily  -     Adult GI  Referral - Consult Only; Future  -     Basic metabolic panel; Future  -     CBC with Platelets & Differential; Future    Renal lesion  Lesion of pancreas: Imaging results reviewed as below and after review of information, provider recommends completion of an MRI to further assess the renal lesion as well as the pancreas lesion.  Order placed.  Message sent to patient.  -     MR Renal wo & w Contrast; Future    Other orders  -     REVIEW OF HEALTH MAINTENANCE PROTOCOL ORDERS  -     Extra Tube; Future  -     Extra Tube        Return in about 3 months (around 7/29/2024) for Medication follow up, bleeding.    The diagnoses, treatment options, risk, benefits, and recommendations were reviewed with patient/guardian.  Questions were answered to patient's/guardian satisfaction.  Red flag signs were reviewed.  Patient/guardian is in agreement with above plan.      Subjective: 73 year old male with history of  CABG x4v (2012 at [facility]), bicuspid aortic valve with stenosis (nonrheumatic) s/p TAVR (02/01/2024), chronic diastolic congestive heart failure, atrial fibrillation (post CABG; resolved after cardioversion; not on anticoagulation), hyperlipidemia, hypertension, central sleep apnea on EPAP, hypothyroidism, migraine with aura, CKD stage II, history of tobacco abuse in remission who presents to clinic for the following complaints:   Patient presents with:  Hospital F/U  Recheck Medication    Patient was admitted on 4/15/2024 - 4/20/2024 with primary diagnoses of rectal bleeding, nonbleeding duodenal ulcer, biopsy for H. pylori, anemia, recent TAVR on anticoagulation Coumadin stopped.    Hospital Course  Wiley Storey is a 73 year old male admitted on  4/15/2024. He presented with melanotic stool while on warfarin after a recent TAVR. Patient states he fells well, no further bleeding.   Patient states he agrees with plan to discharge to home and follow up with GI outpatient.      GI bleed, unclear if upper or lower  Acute blood loss anemia  -with multiple melanotic stools per day  -hemoglobin dropped from almost 11 to low 9's over the course of today  -INR 2.4  -hemodynamically stable so far  -will admit to the hospital  -PPI BID x 8 weeks, then daily  -consult GI: EGD today  EGD results:non bleeding duodenal ulcer. Biopsy for H pylori  -HGB stable today 8.7, down from 9.2 yesterday,   Tolerating advancing diet without difficulty  Stop Coumadin, start Asa 81 mg - per Becker recommendations        Recent TAVR on long term anticoagulation with warfarin  -awaiting cardiology's recommendations for possible warfarin reversal  -monitor  Stop Coumadin, start Asa 81 mg - per Becker recommendations     Coronary artery disease  Previous CABG  Hyperlipidemia  -continue ezetimibe      Since discharge, patient has been feeling good, actually. No bleeding was noted since discharge.  Bloody and black tarry stools are not noted.     The Hgb was noted to be high (15 range) initially, though it dropped down to 9-10. The EGD showed a bleeding duodenal ulcer. Hgb was 8 when he discharge. He got over the dizziness. He wants to get back and exercise. He has been walking and that has been okay for him. Denies any dizziness, lightheadedness, chest pain, shortness of breath, severe headaches, weakness, nausea, vomiting, feeling ill.    Told from the [place] (heart team, Dr. Aguilar) doctor that he has had the warfarin long enough and that he doesn't have to take the warfarin anymore. The plan was to discontinue with the warfarin and now is just on the ASA 81 mg.     Pathology results shows that testing for H. pylori was negative.    Blood pressure noted to be in the normal range today.    Note  from Dr. Do (04/15/2024, italicized):   Patient may need additional follow up imaging tests based on incidental findings on CT at [facility].     CT 4/8/24 [facility] everywhere:   1.  Negative for active gastrointestinal hemorrhage at the time of this exam. Colonic diverticulosis could be a source of intermittent bleeding.   2.  Stable 1.7 centimeter indeterminate cystic lesion in the left kidney. Recommend multiphase renal CT or MRI for further evaluation or comparison with outside imaging.   3.  Stable 5 mm cystic lesion in the inferior pancreatic head which could represent a side branch IPMN.      Can you follow up with patient regarding these findings at your 4/25 visit? These findings were not mentioned in the [facility] 4/15 CT. There is mention in the 1/3/24 [facility] imaging of pancreatic follow up guidelines. Patient prefers to do his specialty care at [facility], so it is possible they have already arranged follow up imaging - please check with patient.     Discussed the above with patient and he does not have plans for follow up with [facility] yet.       The 10 point review of system is negative except as stated in the HPI.    Allergies were reviewed and updated.    Surgical Pathology Exam: [id number]  Order: [id number]  Collected 4/16/2024 11:12 AM       Status: Final result       Visible to patient: Yes (not seen)    0 Result Notes      Component    Case Report   Surgical Pathology Report                         Case: [id number]                                   Authorizing Provider:  Hugo Burnette MD       Collected:           04/16/2024 11:12 AM           Ordering Location:     [facility]      Received:            04/16/2024 11:37 AM                                  [facility]                                                               Pathologist:           Girish Chow MD                                                       Specimen:    Stomach, gastric biopsy                                                                     Final Diagnosis   BIOPSIES OF STOMACH:        -  MINIMAL CHRONIC INFLAMMATION, PATCHY OR IRREGULAR DISTRIBUTION,                MOST CONSISTENT WITH REACTIVE CHANGES        -  NEGATIVE FOR ACTIVE GASTRITIS        -  NEGATIVE FOR DYSPLASIA OR INTESTINAL METAPLASIA        -  NEGATIVE FOR HELICOBACTER ORGANISMS BY IMMUNOHISTOCHEMISTRY          Narrative & Impression   EXAM: CTA ABDOMEN PELVIS WITH CONTRAST  LOCATION: [facility]  DATE: 4/15/2024     INDICATION: Melanoma, elevated INR, anemia, GI bleed protocol  COMPARISON: None.  TECHNIQUE: CT angiogram abdomen pelvis during arterial phase of injection of IV contrast. 2D and 3D MIP reconstructions were performed by the CT technologist. Dose reduction techniques were used.  CONTRAST: isovue 370 90ml     FINDINGS:  ANGIOGRAM ABDOMEN/PELVIS: No evidence of aortic aneurysm or dissection. Moderate calcified and noncalcified atherosclerosis. The celiac, superior mesenteric, bilateral renal and inferior mesenteric arteries are patent. No evidence of critical arterial   stenosis in the pelvis. No evidence of active peritoneal or retroperitoneal hemorrhage. Specifically, no evidence of active, intraluminal gastrointestinal hemorrhage.     LOWER CHEST: Postprocedural changes status post TAVR. Multiple abandoned epicardial pacing leads. No gemma airspace consolidation or pleural effusion.     HEPATOBILIARY: Likely small hepatic cysts and/or hemangiomas, no specific follow-up recommended. No biliary obstruction.     PANCREAS: Normal.     SPLEEN: Normal.     ADRENAL GLANDS: Normal.     KIDNEYS/BLADDER: Bilateral renal cysts, several of which are hemorrhagic/proteinaceous, no specific follow-up recommended. No nephroureterolithiasis or hydronephrosis. Urinary bladder is moderately distended but otherwise unremarkable.     BOWEL: No obstruction or inflammatory change. No evidence of active, intraluminal gastrointestinal  hemorrhage. Normal appendix.     LYMPH NODES: No suspicious lymphadenopathy.     PELVIC ORGANS: Mild prostatomegaly. Otherwise unremarkable.     MUSCULOSKELETAL: No acute bony abnormality.                                                                      IMPRESSION:  1.  No acute abnormality in the abdomen or pelvis. Specifically, no evidence of active, intraluminal gastrointestinal hemorrhage.  2.  Moderate distention of the urinary bladder, correlate for urinary retention/outlet obstruction. No hydronephrosis.         CT Abdomen Pelvis Angiogram with IV Contrast  Order: [id number]  Impression    1.  Negative for active gastrointestinal hemorrhage at the time of this exam. Colonic diverticulosis could be a source of intermittent bleeding.  2.  Stable 1.7 centimeter indeterminate cystic lesion in the left kidney. Recommend multiphase renal CT or MRI for further evaluation or comparison with outside imaging.  3.  Stable 5 mm cystic lesion in the inferior pancreatic head which could represent a side branch IPMN.  Narrative    EXAM:  CT ABDOMEN PELVIS ANGIOGRAM WITH IV CONTRAST  Including 3D image post-processing with or without AI assistance.    COMPARISON:  CT abdomen/pelvis 1/3/2024    FINDINGS:  VASCULAR FINDINGS:  Negative for active gastrointestinal hemorrhage at the time of this exam.    Moderate arterial calcifications without significant stenoses. Patent iliac and mesenteric vasculature and bilateral renal arteries. Infrarenal abdominal aortic ectasia up to 2.5 cm.    ADDITIONAL FINDINGS:  Tiny hepatic densities, likely cysts or hemangiomas. Stable 5 mm cystic lesion in the inferior pancreatic head , likely IPMN (10/182). Stable 7 mm left adrenal nodule. Bilateral renal cysts, including a 1.7 cm hyperdense cyst in the left kidney with  Hounsfield units of 53 (10/159). Normal caliber bowel. Pancolonic diverticulosis. Prostatic calcifications.    Sternotomy. TAVR. Bibasilar atelectasis.  Images on Order  [id number]    Image Retrieve    The full-size image has not yet been retrieved from an outside organization. To retrieve, click the link below.  Scan on 4/8/2024: CT Abdomen Pelvis Angiogram with IV Contrast        Exam End: 04/08/24  6:02 PM    Specimen Collected: 04/08/24  5:49 PM Last Resulted: 04/08/24  7:24 PM   Received From: [facility]  Result Received: 04/09/24  2:28 PM       CT Abdomen Pelvis Angiogram with IV Contrast  Order: [id number]  Impression    1. Overall moderate extent of nonobstructive partially calcified aortoiliac and common femoral atherosclerotic plaque. Dilated infrarenal aorta measuring 25 mm. Moderate right and moderate to severe left iliac tortuosity. Please see verified arterial  measurements in QREADS.  2. A 17 mm round exophytic lesion arising from the upper pole of the left kidney does not measure fluid attenuation. Differential considerations include a solid neoplasm as well as a hyperdense cyst. Dedicated multiphase renal CT or MRI and comparison  with patient's outside CT abdomen/pelvis from 12/08/2017 documented in Care Everywhere suggested for further evaluation.  3. Tiny 5 mm cystic lesion in the inferior pancreatic head which is indeterminate but may represent a small side branch intraductal papillary mucinous neoplasm. Please refer to the attached guidelines.  Narrative    EXAM:  CT ABDOMEN PELVIS ANGIOGRAM WITH IV CONTRAST    CTA of the abdomen and pelvis with intravenous contrast, including independent workstation 3D reformations created with or without AI assistance, performed as part of a pre-TAVR protocol.    COMPARISON: No prior CT abdomen/pelvis available at the time of dictation.    VASCULAR FINDINGS:    ABDOMINAL AORTA:  Diffuse partially calcified atherosclerotic plaque without significant luminal narrowing. Dilation of the infrarenal aorta measuring up to 25 mm.    ILIOFEMORAL ARTERIES:  Right iliac artery tortuosity: Moderate  Right common iliac artery:  Normal in  size. Diffuse eccentric partially calcified plaque with minimal to mild stenosis.    Right external iliac artery:  Widely patent. Minimal calcified plaque proximally.  Right internal iliac artery:  Patent with scattered calcification.  Right common femoral artery:  Mild eccentric partially calcified plaque with minimal stenosis.    Left iliac artery tortuosity:  Moderate to severe    Left common iliac artery:  Normal in size. Diffuse eccentric partially calcified plaque with up to mild stenosis.  Left external iliac artery:  Widely patent.  Left internal iliac artery:  Patent with scattered calcified plaque.  Left common femoral artery:  Patent with minimal partially calcified plaque.    VISCERAL ARTERIES:  Renal Arteries: Single bilateral renal arteries. Partially calcified atherosclerotic plaque in the origins resulting in mild stenosis on the left and minimal stenosis on the right.  Celiac artery:  Widely patent.  Superior mesenteric artery:  Scattered partially calcified plaque with minimal stenosis.  Inferior mesenteric artery:  Mild ostial stenosis.      ADDITIONAL FINDINGS:    Tiny probable cyst in the right hepatic lobe. Tiny 5 mm cystic lesion in the inferior pancreatic head (series 1 image 313; series 5 image 22). Bilateral renal cysts. A 17 mm round exophytic lesion arising from the upper pole of the left kidney does not  measure fluid attenuation (series 1 image 287; series 5 image 84). Mild prostate enlargement with scattered central calcification. Colon diverticulosis. Low bone density. Mild degenerative change lumbar spine.    This examination was performed in conjunction with a CT of the chest, which will be reported separately.        CONSENSUS FOLLOW-UP RECOMMENDATIONS FOR INCIDENTAL PANCREATIC CYSTS:  Maximum cyst dimension <15 mm without high-risk imaging features*: Follow-up MRI/MRCP with and without IV contrast annually for 2 years. If stable, then repeat imaging every 2 years as long as  "clinically indicated.  Maximum cyst dimension greater than or equal to15mm or any cyst with high-risk imaging features*: E-consult to pancreas clinic is recommended as clinically indicated.    *High-risk features include mural nodule or solid component, thickened/enhancing cyst wall, main pancreatic duct diameter >5 mm, abrupt change in main pancreatic duct caliber, lymphadenopathy, cyst growth rate >5 mm/2 years, or cystic lesion causing  biliary obstruction.  These guidelines were developed by consensus of [facility] pancreatologists and radiologists. However, there is currently no evidence that following the guidelines will improve outcomes. The guidelines should be applied in the clinical context of the  individual patient. Patients who are unlikely to be surgical candidates due to severe comorbidities or would not opt for surgery may require less frequent or no follow-up. Maximum cyst dimension refers to the largest measurement of the largest cyst in  the pancreas. In young, otherwise healthy patients, consider an extended period of surveillance.  Images on Order [id number]    Image Retrieve    The full-size image has not yet been retrieved from an outside organization. To retrieve, click the link below.  Scan on 1/3/2024: CT Abdomen Pelvis Angiogram with IV Contrast        Exam End: 01/03/24  3:30 PM    Specimen Collected: 01/03/24  3:29 PM Last Resulted: 01/03/24  4:40 PM   Received From: [facility]  Result Received: 02/05/24  5:17 AM           Objective:   /88   Pulse 56   Temp 97.7  F (36.5  C) (Oral)   Resp 18   Ht 1.676 m (5' 6\")   Wt 72.2 kg (159 lb 1.9 oz)   SpO2 100%   BMI 25.68 kg/m    General: Active, alert, nontoxic-appearing.  No acute distress.  HEENT: Normocephalic, atraumatic.  Pupils are equal and round.  Sclera is clear.  Normal external ears. Nares patent.  Moist mucous membranes.    Cardiac: RRR.  S1, S2 present.  No murmurs, rubs, or gallops.  Respiratory/chest: Clear to " auscultation bilaterally.  No wheezes, rales, rhonchi.  Breathing is not labored.  No accessory muscle usage.  Abdomen: Soft, nondistended, nontender.  No masses or organomegaly noted.  No guarding or rebound tenderness appreciated.  Extremities: Voluntary movements intact.  Integumentary: No concerning rash or skin changes appreciated.    Amount of time spent in chart review, direct patient contact, care coordination, and related activities to patient care on the day of appointment: 45 minutes.       Stew Saravia MD  [facility] [place] [address]  Phone: [phone]  Fax: [phone]    5/1/2024  8:25 AM          Current Outpatient Medications   Medication Sig Dispense Refill    amoxicillin (AMOXIL) 500 MG capsule Take 4 caps (2000 mg) 1 hour prior to dental procedure.      aspirin (ASA) 81 MG chewable tablet Take 1 tablet (81 mg) by mouth daily for 30 days 30 tablet 0    Blood Pressure Monitoring (B-D ASSURE BPM/AUTO ARM CUFF) MISC 1 Device daily 1 Device 0    Cholecalciferol (VITAMIN D PO) Take 3,000 Units by mouth daily 1 tab daily      CRANBERRY EXTRACT PO Take 2 tablets by mouth daily      ezetimibe (ZETIA) 10 MG tablet Take 1 tablet (10 mg) by mouth daily 90 tablet 1    Multiple Vitamins-Minerals (MULTIVITAL PO) Take by mouth daily 1 TABLET DAILY      omeprazole (PRILOSEC) 40 MG DR capsule Take 1 capsule (40 mg) by mouth 2 times daily for 60 days 120 capsule 0    Probiotic Product (PROBIOTIC GUMMIES PO) Take 1 tablet by mouth 2 times daily      saw palmetto 450 MG CAPS capsule Take 900 mg by mouth daily      vitamin B complex with vitamin C (VITAMIN  B COMPLEX) tablet Take 1 tablet by mouth daily      vitamin B-12 (CYANOCOBALAMIN) 1000 MCG tablet Take 1,000 mcg by mouth three times a week      vitamin C (ASCORBIC ACID) 1000 MG TABS Take 500 mg by mouth daily.      ORDER FOR DME Use your BiPAP device as directed by your provider. (Patient not taking: Reported on 4/29/2024)        No current facility-administered medications for this visit.       Allergies   Allergen Reactions    Aspirin      Early 1990s, took one dose of aspirin + ibuprofen and had severe GI bleed - hospitalization required    Ibuprofen      Early 1990s, took one dose of aspirin + ibuprofen and had severe GI bleed -hospitalization required       Patient Active Problem List    Diagnosis Date Noted    S/P TAVR (transcatheter aortic valve replacement) 04/15/2024     Priority: Medium     Lifelong bacterial endocarditis prophylaxis.      Gastrointestinal hemorrhage with melena 04/15/2024     Priority: Medium    Non-melanoma skin cancer 02/05/2024     Priority: Medium    History of GI bleed 02/05/2024     Priority: Medium     In 1990's after taking aspirin/ibuprofen.      Thrombocytopenia (H24) 02/05/2024     Priority: Medium    Atrial fibrillation, unspecified type (H) 02/05/2024     Priority: Medium    History of transcatheter aortic valve implantation (LASHAE) 02/05/2024     Priority: Medium    Chronic diastolic (congestive) heart failure (H) 02/01/2024     Priority: Medium    Stage 2 chronic kidney disease 02/01/2024     Priority: Medium    Bicuspid aortic valve 05/16/2023     Priority: Medium    History of coronary artery bypass surgery 12/05/2022     Priority: Medium     Last Assessment & Plan:    Formatting of this note might be different from the original.   Fortunately, he is not had symptoms of exertional angina. The patient is aware of the need for Secondary prevention through aggressive CV risk factor modifications to include: blood pressure control, LDL control and daily exercise (per guidelines), etc.      Tobacco abuse, in remission 12/05/2022     Priority: Medium     Last Assessment & Plan:    Formatting of this note might be different from the original.   Fortunately, he is not smoking at this time.      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   Tylenol if needed.      History of atrial fibrillation 07/11/2019     Priority: Medium     Formatting of this note might be different from the original.   3/12:  s/p MAZE during CABG     7/12:  s/p cardioversion  Formatting of this note might be different from the original.   3/12:  s/p MAZE during CABG     7/12:  s/p cardioversion      Rosacea 07/11/2019     Priority: Medium    Tinnitus 07/11/2019     Priority: Medium     Formatting of this note might be different from the original.   2/18:  Audiogram:  Bilateral moderate high frequency sensorineural hearing loss from 1474-4875 Hz with normal hearing 250-2000 Hz. Speech discrimination ability in quiet is excellent at normal conversational levels. Not a candidate for amplification  Formatting of this note might be different from the original.   2/18:  Audiogram:  Bilateral moderate high frequency sensorineural hearing loss from 3801-1689 Hz with normal hearing 250-2000 Hz. Speech discrimination ability in quiet is excellent at normal conversational levels. Not a candidate for amplification  Formatting of this note might be different from the original.   2/18:  Audiogram:  Bilateral moderate high frequency sensorineural hearing loss from 8387-9699 Hz with normal hearing 250-2000 Hz. Speech discrimination ability in quiet is excellent at normal conversational levels. Not a candidate for amplification  Formatting of this note might be different from the original.   Formatting of this note might be  different from the original.    2/18:  Audiogram:  Bilateral moderate high frequency sensorineural hearing loss from 5058-2751 Hz with normal hearing 250-2000 Hz. Speech discrimination ability in quiet is excellent at normal conversational levels. Not a candidate for amplification   Formatting of this note might be different from the original.    2/18:  Audiogram:  Bilateral moderate high frequency sensorineural hearing loss from 8986-0269 Hz with normal hearing 250-2000 Hz. Speech discrimination ability in quiet is excellent at normal conversational levels. Not a candidate for amplification   Formatting of this note might be different from the original.    2/18:  Audiogram:  Bilateral moderate high frequency sensorineural hearing loss from 8661-0184 Hz with normal hearing 250-2000 Hz. Speech discrimination ability in quiet is excellent at normal conversational levels. Not a candidate for amplification      Recurrent UTI 08/01/2018     Priority: Medium     Formatting of this note might be different from the original.   11/17:     12/17:  Triphasic CT Kidneys:  No stones, microlobulated contour of post-inf bladder wall, likely trabeculations; correlate w/ cysto   1/18:  Cysto:  normal  Formatting of this note might be different from the original.   11/17:     12/17:  Triphasic CT Kidneys:  No stones, microlobulated contour of post-inf bladder wall, likely trabeculations; correlate w/ cysto   1/18:  Cysto:  normal      Primary central sleep apnea 02/07/2017     Priority: Medium     Formatting of this note might be different from the original.   5/31/17:  Sleep study titration:  ASV auto w/ EPAP min 5  max 15; PS min 0  max 15; resp events incl central apneas were controlled at these settings  Formatting of this note might be different from the original.   5/31/17:  Sleep study titration:  ASV auto w/ EPAP min 5  max 15; PS min 0  max 15; resp events incl central apneas were controlled at these  settings  Formatting of this note might be different from the original.   Formatting of this note might be different from the original.    5/31/17:  Sleep study titration:  ASV auto w/ EPAP min 5  max 15; PS min 0  max 15; resp events incl central apneas were controlled at these settings   Formatting of this note might be different from the original.    5/31/17:  Sleep study titration:  ASV auto w/ EPAP min 5  max 15; PS min 0  max 15; resp events incl central apneas were controlled at these settings      Advanced directives, counseling/discussion 03/19/2013     Priority: Medium     Advance Care Planning:   ACP Review and Resources Provided:  Reviewed chart for advance care plan.  Wiley Storey has an up to date advance care plan on file. See additional documentation in Problem List and click on code status for document details. Confirmed/documented designated decision maker(s). See permanent comments section of demographics in clinical tab.   Added by Viki Olmedo on 3/19/2013            NSVT (nonsustained ventricular tachycardia) (H)      Priority: Medium    Hyperlipidemia LDL goal <70 03/07/2012     Priority: Medium    Intermediate coronary syndrome (H) 03/07/2012     Priority: Medium    Abnormal stress electrocardiogram test 03/06/2012     Priority: Medium    ASCVD (arteriosclerotic cardiovascular disease) 03/06/2012     Priority: Medium    Chest discomfort 02/09/2012     Priority: Medium    Hyperthyroidism 02/08/2012     Priority: Medium     Was taking supplements from herbalist with iodine in them -   Treated with methimazole  Avoid IV contrast - iodine or supplements      Hypertension goal BP (blood pressure) < 140/90 02/07/2012     Priority: Medium     On lisinopril -   Started in 2003        Sleep apnea 02/07/2012     Priority: Medium     Sleeps with CPAP -        Atrial fibrillation (H) 02/07/2012     Priority: Medium     New onset 2012 -   Getting ECHO to look at aortic vavle (hx of sclerosis)  On  coumadin (hx of ASCVD s/p bypass) from 2012 - 9/2015 (stopped by cardiology)    S/p maze procedure      Aortic sclerosis 10/11/2011     Priority: Medium    Esophageal reflux 10/11/2011     Priority: Medium    Headache 10/11/2011     Priority: Medium     Problem list name updated by automated process. Provider to review      Thyroid nodule 10/11/2011     Priority: Medium     Needs repeat thyroid ultrasound in 2014      Lumbago 11/05/2004     Priority: Medium    Peptic ulcer without hemorrhage or perforation 11/05/2004     Priority: Medium     Formatting of this note might be different from the original.   nSAID-INDUCED         Family History   Problem Relation Age of Onset    Cardiovascular Unknown         aortic aneurysm, CAD    Cancer Mother 86        pancreatic    C.A.D. Mother         passed away at 86    C.A.D. Brother         stent    Diabetes Maternal Grandmother     Hypertension Brother        Past Surgical History:   Procedure Laterality Date    ANESTHESIA CARDIOVERSION  07/02/2012    Procedure: ANESTHESIA CARDIOVERSION;  Anesthesia Off site Cardioversion;  Surgeon: Provider, Generic Anesthesia;  Location: [facility] OR    BYPASS GRAFT ARTERY CORONARY  03/08/2012    Procedure:BYPASS GRAFT ARTERY CORONARY; Median Sternotomy, Coronary Artery Bypass Graft X4 Using the Left Internal Mamary and Left Saphenous Vein with MAZE Procedure, and On Pump Oxygenator.; Surgeon:MICHOACANO HOANG; Location:[facility] OR    COLONOSCOPY N/A 08/16/2016    Procedure: COLONOSCOPY;  Surgeon: Brad Peralta MD;  Location:  GI    COLONOSCOPY N/A 11/10/2016    Procedure: COLONOSCOPY;  Surgeon: Brad Peralta MD;  Location: [facility]    ESOPHAGOSCOPY, GASTROSCOPY, DUODENOSCOPY (EGD), COMBINED N/A 4/16/2024    Procedure: ESOPHAGOGASTRODUODENOSCOPY;  Surgeon: Hugo Burnette MD;  Location: [facility] OR    MAZE PROCEDURE  03/08/2012    Procedure:MAZE PROCEDURE; Surgeon:MICHOACANO HOANG; Location:[facility] OR    ulcer operation  01/01/1991    [facility]  TRANSCATHETER AORTIC VALVE REPLACE (TAVR/LASHAE), W/PROSTH VALVE; TRANSCAVAL APPR  2024    See [place] Admission from 2024        Social History     Socioeconomic History    Marital status:      Spouse name: Not on file    Number of children: Not on file    Years of education: Not on file    Highest education level: Not on file   Occupational History    Not on file   Tobacco Use    Smoking status: Former     Current packs/day: 0.00     Types: Cigarettes     Quit date: 1983     Years since quittin.3     Passive exposure: Never    Smokeless tobacco: Never   Vaping Use    Vaping status: Never Used   Substance and Sexual Activity    Alcohol use: Yes     Comment: occ wine    Drug use: No    Sexual activity: Yes     Partners: Female   Other Topics Concern    Parent/sibling w/ CABG, MI or angioplasty before 65F 55M? No   Social History Narrative    Not on file     Social Determinants of Health     Financial Resource Strain: Low Risk  (2024)    Financial Resource Strain     Within the past 12 months, have you or your family members you live with been unable to get utilities (heat, electricity) when it was really needed?: No   Food Insecurity: Low Risk  (2024)    Food Insecurity     Within the past 12 months, did you worry that your food would run out before you got money to buy more?: No     Within the past 12 months, did the food you bought just not last and you didn t have money to get more?: No   Transportation Needs: Low Risk  (2024)    Transportation Needs     Within the past 12 months, has lack of transportation kept you from medical appointments, getting your medicines, non-medical meetings or appointments, work, or from getting things that you need?: No   Physical Activity: Not on file   Stress: Not on file   Social Connections: Not on file   Interpersonal Safety: Low Risk  (4/15/2024)    Interpersonal Safety     Do you feel physically and emotionally safe where you currently  live?: Yes     Within the past 12 months, have you been hit, slapped, kicked or otherwise physically hurt by someone?: No     Within the past 12 months, have you been humiliated or emotionally abused in other ways by your partner or ex-partner?: No   Housing Stability: Low Risk  (2/5/2024)    Housing Stability     Do you have housing? : Yes     Are you worried about losing your housing?: No

## 2024-04-30 LAB
ANION GAP SERPL CALCULATED.3IONS-SCNC: 9 MMOL/L (ref 7–15)
BUN SERPL-MCNC: 19.9 MG/DL (ref 8–23)
CALCIUM SERPL-MCNC: 8.8 MG/DL (ref 8.8–10.2)
CHLORIDE SERPL-SCNC: 108 MMOL/L (ref 98–107)
CHOLEST SERPL-MCNC: 150 MG/DL
CREAT SERPL-MCNC: 1.19 MG/DL (ref 0.67–1.17)
CREAT UR-MCNC: 52.9 MG/DL
DEPRECATED HCO3 PLAS-SCNC: 23 MMOL/L (ref 22–29)
EGFRCR SERPLBLD CKD-EPI 2021: 64 ML/MIN/1.73M2
GLUCOSE SERPL-MCNC: 87 MG/DL (ref 70–99)
HDLC SERPL-MCNC: 39 MG/DL
LDLC SERPL CALC-MCNC: 80 MG/DL
MICROALBUMIN UR-MCNC: <12 MG/L
MICROALBUMIN/CREAT UR: NORMAL MG/G{CREAT}
NONHDLC SERPL-MCNC: 111 MG/DL
POTASSIUM SERPL-SCNC: 4.2 MMOL/L (ref 3.4–5.3)
SODIUM SERPL-SCNC: 140 MMOL/L (ref 135–145)
TRIGL SERPL-MCNC: 155 MG/DL

## 2024-05-01 PROBLEM — K86.9 LESION OF PANCREAS: Status: ACTIVE | Noted: 2024-05-01

## 2024-05-01 PROBLEM — N28.9 RENAL LESION: Status: ACTIVE | Noted: 2024-05-01

## 2024-05-03 ENCOUNTER — HOSPITAL ENCOUNTER (OUTPATIENT)
Dept: MRI IMAGING | Facility: CLINIC | Age: 74
Discharge: HOME | End: 2024-05-03
Attending: FAMILY MEDICINE | Admitting: FAMILY MEDICINE
Payer: COMMERCIAL

## 2024-05-03 DIAGNOSIS — N28.9 RENAL LESION: ICD-10-CM

## 2024-05-03 DIAGNOSIS — K86.9 LESION OF PANCREAS: ICD-10-CM

## 2024-05-03 PROCEDURE — 255N000002 HC RX 255 OP 636: Performed by: FAMILY MEDICINE

## 2024-05-03 PROCEDURE — 74183 MRI ABD W/O CNTR FLWD CNTR: CPT

## 2024-05-03 PROCEDURE — A9585 GADOBUTROL INJECTION: HCPCS | Performed by: FAMILY MEDICINE

## 2024-05-03 RX ORDER — GADOBUTROL 604.72 MG/ML
7.5 INJECTION INTRAVENOUS ONCE
Status: COMPLETED | OUTPATIENT
Start: 2024-05-03 | End: 2024-05-03

## 2024-05-03 RX ADMIN — GADOBUTROL 7.5 ML: 604.72 INJECTION INTRAVENOUS at 14:27

## 2024-05-06 NOTE — PROGRESS NOTES
Outpatient Sleep Medicine Consultation:      Name: Wiley Storey MRN# [id number]   Age: 73 year old YOB: 1950     Date of Consultation: May 6, 2024  Consultation is requested by: No referring provider defined for this encounter. No ref. provider found  Primary care provider: Stew Saravia       Reason for Sleep Consult:     Wiley Storey is sent by No ref. provider found for a sleep consultation regarding complex sleep apnea related to heart disease.    Patient s Reason for visit  Wiley Storey main reason for visit: moved to new location using a b pap machine  Patient states problem(s) started: 22 years ago  Wiley Storey's goals for this visit: to conect with new doctor to monitor my progress and b pap machine functioning           Assessment and Plan:     Summary Sleep Diagnoses and Recommendations:  (G47.31) Primary central sleep apnea  (primary encounter diagnosis)  Comment: Wiley presents to re-establish care for previously diagnosed central apnea with Cheyne Taylor respiration. He was last seen in our system by Dr. Alexandra in 2016. He then moved around to [place] and [place]. He is using ASV religiously and benefits from use. He has a full face mask (Vitera) and uses a Boomerang gel pad for irritation of nasal bridge. He notes a little puffiness of his eyes in the morning, possibly from a little leak by the eyes, but his leak on the download is low.  He has no concerns about his sleep or machine other than to get established with DME for supplies.  Plan: Comprehensive DME        Continue auto ASV EPAP 5-15 cm, PS 0-15 cm. A prescription was written for new supplies. He was given contact information for [facility]. We reviewed recommendations for cleaning and replacing supplies. We talked about trying a mask like the F30i, but for now he does not want to make any changes.          Comorbid Diagnoses:  CABG x4v (2012 at [facility]), bicuspid aortic valve with stenosis  (nonrheumatic) s/p TAVR (02/01/2024), chronic diastolic congestive heart failure, atrial fibrillation (post CABG; resolved after cardioversion; not on anticoagulation), hyperlipidemia, hypertension, central sleep apnea on ASV, hypothyroidism, migraine with aura, CKD stage II, history of tobacco abuse in remission     Summary Counseling:    Sleep Testing Reviewed  Obstructive Sleep Apnea Reviewed  Complications of Untreated Sleep Apnea Reviewed      Patient will follow up in 1 year.  Bennett Goltz, PA-C      Total time spent reviewing medical records, history and physical examination, review of previous testing and interpretation as well as documentation on this date:65 min    CC: No ref. provider found          History of Present Illness:     Wiley Storey presents to re-establish care for previously diagnosed complex sleep apnea related to heart disease. He was last seen in 2016 by Dr. Alexandra. Since then, he was in [place] for a few years and then [place] for 4 years. He is on a ResMed ASV with EPAP 5-15 cm, PS 0-15 cm (from [facility] in [place], about 5 years ago). He feels the ASV was better than CPAP. He was getting sores on his nasal bridge from his mask. He was given a different mask and a gel pad for the bridge. He gets some puffiness of the eyes. He feels the ASV is working pretty well in the last year. His mask is a medium Vitera. He just replaced the cushion a couple of weeks ago. They feel tight initially but then loosen and he is not noticing leak. He breathes through his mouth. He does get dry mouth and has adjusted the humidifier. He is not aware of snoring with ASV. If he does not use ASV, he wakes with a jolt, especially when on his back. He uses ASV religiously so does not know if he would be more tired without it. He says it is like a crutch and he has been using a PAP device for over 20 years. He is comfortable with the pressures.     He used to exercise a lot before his valve replacement. He then  had a GI bleed on warfarin. Now, he feels tired all of the time, which he feels is from omeprazole but is more likely the anemia.     The compliance data shows that from 2/7/24-5/6/24, the patient used the ASV for 90/90 nights, 99% of nights for >4 hours.  The 95th% pressure is 10.4/7.6 cm.  The 95th% leak is 10.6 lpm.  The average nightly usage is 7:44.  The average AHI is 0.4/hr.       TTE 4/9/24: 1. Status post 26mm Harrison Conner 3 Ultra pericardial aortic valve prosthesis (1-FEB-2024).   2. Normal aortic valve tissue prosthesis (by Doppler), prosthesis systolic mean Doppler gradient 8 mmHg, orifice area by Doppler:2.14 cm2.   3. Mild aortic valve periprosthetic regurgitation (anterior).   4. No aortic valve prosthetic regurgitation.   5. Normal left ventricular chamber size, no regional wall motion abnormalities, calculated 2-D biplane volumetric ejection fraction of 58%.   6. Borderline enlarged right ventricular chamber size, borderline reduced systolic function, estimated right ventricular systolic pressure 31 mmHg (right atrial pressure of 5 mmHg).       Past Sleep Evaluations: split PSG 4/9/2012: AHI 23.5/hr with a Cheyne Taylor breathing pattern (most events were hypopneas, only 0.5 min supine sleep and 5.5 min of REM), O2 min 91%. RDI 34.9/hr. CPAP was titrated to 5 and 6 cm with residual central apnea and Cheyne Taylor breathing. He was then switched to ASV with EPAP 5-15, PS 0-15 cm, max pressure 25 cm, rate auto and rise time 2. This was effective with a Quattro medium full face mask. Wt: 170#.  He had another PSG in the early 2000's that also showed central apnea.     SLEEP-WAKE SCHEDULE:     Work/School Days: Patient goes to school/work: No   Usually gets into bed at   10:30-11:30 PM. May read if he goes to bed at 10:30 PM.  Takes patient about five to ten minutes to fall asleep  Has trouble falling asleep maybe one nights per week  Wakes up in the middle of the night once someetimes twice.  Wakes  up due to Use the bathroom  He has trouble falling back asleep maybe once per week times a week.   It usually takes   to get back to sleep  Patient is usually up at between seven and eight am. He gets auras/migraines if he sleeps too long.  Uses alarm: No    Weekends/Non-work Days/All Other Days:  Usually gets into bed at between teb thirty and elevan thirty pm   Takes patient about l usually read and then fall asleep quickly to fall asleep  Patient is usually up at about seven am  Uses alarm: No    Sleep Need  Patient gets  about seven hours sleep on average   Patient thinks he needs about betweeb seven and eight hours sleep    Wiley Storey prefers to sleep in this position(s): Side wants to lay on left but can't do that for long after his valve surgery. He sleeps some on his back and right.   Patient states they do the following activities in bed:      Naps  Patient takes a purposeful nap  almost daily in the last few years and naps are usually ten to twenty minutes in duration, watching TV around 3 PM. Used to never nap  He feels better after a nap: No  He dozes off unintentionally   days per week  Patient has had a driving accident or near-miss due to sleepiness/drowsiness:   no. Might head nod as a passenger on a long drive.      SLEEP DISRUPTIONS:    Breathing/Snoring  Patient snores:No  Other people complain about his snoring: No  Patient has been told he stops breathing in his sleep:Yes without ASV  He has issues with the following: Morning mouth dryness;Getting up to urinate more than once. no morning headaches. no nocturnal heartburn or reflux. no nasal congestion at night but has narrow nasal airway. He has been on omeprazole (for GI bleed related to warfarin) and he feels it makes him very hungry. He will only be on it for 2 months.    Movement:  Patient gets pain, discomfort, with an urge to move:  No restless legs symptoms  It happens when he is resting:  No  It happens more at night:  No  Patient  has been told he kicks his legs at night:  No     Behaviors in Sleep:  Wiley Storey has experienced the following behaviors while sleeping:  wears a retainer for 30 years (may have had bruxism)  Pt denies sleep talking, sleep walking, and dream enactment behavior. Pt denies sleep paralysis, hypnagogue and cataplexy.       Is there anything else you would like your sleep provider to know:        CAFFEINE AND OTHER SUBSTANCES:    Patient consumes caffeinated beverages per day:  none  Last caffeine use is usually: does not apply  List of any prescribed or over the counter stimulants that patient takes: none  List of any prescribed or over the counter sleep medication patient takes:    List of previous sleep medications that patient has tried:    Patient drinks alcohol to help them sleep: No  Patient drinks alcohol near bedtime: No    Family History:  Patient has a family member been diagnosed with a sleep disorder: No        Social History:  He lives with his wife  He is retired. He was a  in [place]. He taught special Ed, principal and superintendent.       SCALES:    EPWORTH SLEEPINESS SCALE         5/7/2024    11:28 AM    [place] Sleepiness Scale ( RAÚL Griffin  [id number]<br>ESS - [place]/English - Final version - 21 Nov 07 - [facility].)   Sitting and reading Slight chance of dozing   Watching TV Slight chance of dozing   Sitting, inactive in a public place (e.g. a theatre or a meeting) Would never doze   As a passenger in a car for an hour without a break Would never doze   Lying down to rest in the afternoon when circumstances permit Slight chance of dozing   Sitting and talking to someone Would never doze   Sitting quietly after a lunch without alcohol Slight chance of dozing   In a car, while stopped for a few minutes in traffic Would never doze   [place] Score (MC) 4   [place] Score (Sleep) 4         INSOMNIA SEVERITY INDEX (ADIEL)          5/7/2024    10:58 AM   Insomnia Severity Index (ADIEL)  "  Difficulty falling asleep 1   Difficulty staying asleep 1   Problems waking up too early 0   How SATISFIED/DISSATISFIED are you with your CURRENT sleep pattern? 2   How NOTICEABLE to others do you think your sleep problem is in terms of impairing the quality of your life? 2   How WORRIED/DISTRESSED are you about your current sleep problem? 2   To what extent do you consider your sleep problem to INTERFERE with your daily functioning (e.g. daytime fatigue, mood, ability to function at work/daily chores, concentration, memory, mood, etc.) CURRENTLY? 2   ADIEL Total Score 10       Guidelines for Scoring/Interpretation:  Total score categories:  0-7 = No clinically significant insomnia   8-14 = Subthreshold insomnia   15-21 = Clinical insomnia (moderate severity)  22-28 = Clinical insomnia (severe)  Used via courtesy of www.VM Enterprisesth.va.gov with permission from Stew Martinez PhD., [facility]      STOP BANG         5/7/2024    11:29 AM   STOP BANG Questionnaire (  2008, the American Society of Anesthesiologists, Inc. [employer]   1. Snoring - Do you snore loudly (louder than talking or loud enough to be heard through closed doors)? No   2. Tired - Do you often feel tired, fatigued, or sleepy during daytime? No   3. Observed - Has anyone observed you stop breathing during your sleep? Yes   4. Blood pressure - Do you have or are you being treated for high blood pressure? No   5. BMI - BMI more than 35 kg/m2? No   6. Age - Age over 50 yr old? Yes   7. Neck circumference - Neck circumference greater than 40 cm? No   8. Gender - Gender male? Yes   STOP BANG Score (MC): 2 (Low risk of ADELIA)         GAD7         No data to display                  CAGE-AID         No data to display                CAGE-AID reprinted with permission from the [employer] - /l?k â€¢ splik/ Journal, DWIGHT Gold. and SUAD Callejas, \"Conjoint screening questionnaires for alcohol and drug abuse\" [place] Medical Journal 94: 135-140, " 1995.      PATIENT HEALTH QUESTIONNAIRE-9 (PHQ - 9)         No data to display                Developed by Yajaira Nicholas, Rosey Mendoza, Donis Thakkar and colleagues, with an educational regan from [employer]. No permission required to reproduce, translate, display or distribute.        Allergies:    Allergies   Allergen Reactions    Aspirin      Early 1990s, took one dose of aspirin + ibuprofen and had severe GI bleed - hospitalization required    Ibuprofen      Early 1990s, took one dose of aspirin + ibuprofen and had severe GI bleed -hospitalization required       Medications:    Current Outpatient Medications   Medication Sig Dispense Refill    amoxicillin (AMOXIL) 500 MG capsule Take 4 caps (2000 mg) 1 hour prior to dental procedure.      aspirin (ASA) 81 MG chewable tablet Take 1 tablet (81 mg) by mouth daily for 30 days 30 tablet 0    Blood Pressure Monitoring (B-D ASSURE BPM/AUTO ARM CUFF) MISC 1 Device daily 1 Device 0    Cholecalciferol (VITAMIN D PO) Take 3,000 Units by mouth daily 1 tab daily      CRANBERRY EXTRACT PO Take 2 tablets by mouth daily      ezetimibe (ZETIA) 10 MG tablet Take 1 tablet (10 mg) by mouth daily 90 tablet 1    Multiple Vitamins-Minerals (MULTIVITAL PO) Take by mouth daily 1 TABLET DAILY      omeprazole (PRILOSEC) 40 MG DR capsule Take 1 capsule (40 mg) by mouth 2 times daily for 60 days 120 capsule 0    Probiotic Product (PROBIOTIC GUMMIES PO) Take 1 tablet by mouth 2 times daily      saw palmetto 450 MG CAPS capsule Take 900 mg by mouth daily      vitamin B complex with vitamin C (VITAMIN  B COMPLEX) tablet Take 1 tablet by mouth daily      vitamin B-12 (CYANOCOBALAMIN) 1000 MCG tablet Take 1,000 mcg by mouth three times a week      vitamin C (ASCORBIC ACID) 1000 MG TABS Take 500 mg by mouth daily.         Problem List:  Patient Active Problem List    Diagnosis Date Noted    Lesion of pancreas 05/01/2024     Priority: Medium    Renal lesion 05/01/2024     Priority:  Medium    S/P TAVR (transcatheter aortic valve replacement) 04/15/2024     Priority: Medium     Lifelong bacterial endocarditis prophylaxis.      Gastrointestinal hemorrhage with melena 04/15/2024     Priority: Medium    Non-melanoma skin cancer 02/05/2024     Priority: Medium    History of GI bleed 02/05/2024     Priority: Medium     In 1990's after taking aspirin/ibuprofen.      Thrombocytopenia (H24) 02/05/2024     Priority: Medium    Atrial fibrillation, unspecified type (H) 02/05/2024     Priority: Medium    History of transcatheter aortic valve implantation (LASHAE) 02/05/2024     Priority: Medium    Chronic diastolic (congestive) heart failure (H) 02/01/2024     Priority: Medium    Stage 2 chronic kidney disease 02/01/2024     Priority: Medium    Bicuspid aortic valve 05/16/2023     Priority: Medium    History of coronary artery bypass surgery 12/05/2022     Priority: Medium     Last Assessment & Plan:    Formatting of this note might be different from the original.   Fortunately, he is not had symptoms of exertional angina. The patient is aware of the need for Secondary prevention through aggressive CV risk factor modifications to include: blood pressure control, LDL control and daily exercise (per guidelines), etc.      Tobacco abuse, in remission 12/05/2022     Priority: Medium     Last Assessment & Plan:    Formatting of this note might be different from the original.   Fortunately, he is not smoking at this time.      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   Tylenol if needed.      History of atrial fibrillation 07/11/2019     Priority: Medium     Formatting of this note might be different from the original.   3/12:  s/p MAZE during CABG     7/12:  s/p cardioversion  Formatting of this note might be different from the original.   3/12:  s/p MAZE during CABG     7/12:  s/p cardioversion      Rosacea 07/11/2019     Priority: Medium    Tinnitus 07/11/2019     Priority: Medium     Formatting of this note might be different from the original.   2/18:  Audiogram:  Bilateral moderate high frequency sensorineural hearing loss from 0866-8262 Hz with normal hearing 250-2000 Hz. Speech discrimination ability in quiet is excellent at normal conversational levels. Not a candidate for amplification  Formatting of this note might be different from the original.   2/18:  Audiogram:  Bilateral moderate high frequency sensorineural hearing loss from 5212-1515 Hz with normal hearing 250-2000 Hz. Speech discrimination ability in quiet is excellent at normal conversational levels. Not a candidate for amplification  Formatting of this note might be different from the original.   2/18:  Audiogram:  Bilateral moderate high frequency sensorineural hearing loss from 2179-1214 Hz with normal hearing 250-2000 Hz. Speech discrimination ability in quiet is excellent at normal conversational levels. Not a candidate for amplification  Formatting of this note might be different from the original.   Formatting of this note might be different from the original.    2/18:  Audiogram:  Bilateral moderate high frequency sensorineural hearing loss from 6148-9341 Hz with normal hearing 250-2000 Hz. Speech discrimination ability in quiet is excellent at normal conversational levels. Not a candidate for amplification   Formatting of this note might be different from the original.    2/18:  Audiogram:  Bilateral moderate high  frequency sensorineural hearing loss from 7175-7302 Hz with normal hearing 250-2000 Hz. Speech discrimination ability in quiet is excellent at normal conversational levels. Not a candidate for amplification   Formatting of this note might be different from the original.    2/18:  Audiogram:  Bilateral moderate high frequency sensorineural hearing loss from 2989-6258 Hz with normal hearing 250-2000 Hz. Speech discrimination ability in quiet is excellent at normal conversational levels. Not a candidate for amplification      Recurrent UTI 08/01/2018     Priority: Medium     Formatting of this note might be different from the original.   11/17:     12/17:  Triphasic CT Kidneys:  No stones, microlobulated contour of post-inf bladder wall, likely trabeculations; correlate w/ cysto   1/18:  Cysto:  normal  Formatting of this note might be different from the original.   11/17:     12/17:  Triphasic CT Kidneys:  No stones, microlobulated contour of post-inf bladder wall, likely trabeculations; correlate w/ cysto   1/18:  Cysto:  normal      Primary central sleep apnea 02/07/2017     Priority: Medium     Formatting of this note might be different from the original.   5/31/17:  Sleep study titration:  ASV auto w/ EPAP min 5  max 15; PS min 0  max 15; resp events incl central apneas were controlled at these settings  Formatting of this note might be different from the original.   5/31/17:  Sleep study titration:  ASV auto w/ EPAP min 5  max 15; PS min 0  max 15; resp events incl central apneas were controlled at these settings  Formatting of this note might be different from the original.   Formatting of this note might be different from the original.    5/31/17:  Sleep study titration:  ASV auto w/ EPAP min 5  max 15; PS min 0  max 15; resp events incl central apneas were controlled at these settings   Formatting of this note might be different from the original.    5/31/17:  Sleep study titration:  ASV auto w/  EPAP min 5  max 15; PS min 0  max 15; resp events incl central apneas were controlled at these settings      Advanced directives, counseling/discussion 03/19/2013     Priority: Medium     Advance Care Planning:   ACP Review and Resources Provided:  Reviewed chart for advance care plan.  Wiley Storey has an up to date advance care plan on file. See additional documentation in Problem List and click on code status for document details. Confirmed/documented designated decision maker(s). See permanent comments section of demographics in clinical tab.   Added by Viki Olmedo on 3/19/2013            NSVT (nonsustained ventricular tachycardia) (H)      Priority: Medium    Hyperlipidemia LDL goal <70 03/07/2012     Priority: Medium    Intermediate coronary syndrome (H) 03/07/2012     Priority: Medium    Abnormal stress electrocardiogram test 03/06/2012     Priority: Medium    ASCVD (arteriosclerotic cardiovascular disease) 03/06/2012     Priority: Medium    Chest discomfort 02/09/2012     Priority: Medium    Hyperthyroidism 02/08/2012     Priority: Medium     Was taking supplements from herbalist with iodine in them -   Treated with methimazole  Avoid IV contrast - iodine or supplements      Hypertension goal BP (blood pressure) < 140/90 02/07/2012     Priority: Medium     On lisinopril -   Started in 2003        Sleep apnea 02/07/2012     Priority: Medium     Sleeps with CPAP -        Atrial fibrillation (H) 02/07/2012     Priority: Medium     New onset 2012 -   Getting ECHO to look at aortic vavle (hx of sclerosis)  On coumadin (hx of ASCVD s/p bypass) from 2012 - 9/2015 (stopped by cardiology)    S/p maze procedure      Aortic sclerosis 10/11/2011     Priority: Medium    Esophageal reflux 10/11/2011     Priority: Medium    Headache 10/11/2011     Priority: Medium     Problem list name updated by automated process. Provider to review      Thyroid nodule 10/11/2011     Priority: Medium     Needs repeat thyroid ultrasound  in 2014      Lumbago 11/05/2004     Priority: Medium    Peptic ulcer without hemorrhage or perforation 11/05/2004     Priority: Medium     Formatting of this note might be different from the original.   nSAID-INDUCED          Past Medical/Surgical History:  Past Medical History:   Diagnosis Date    Atrial fibrillation (H)     Maze 3/2012    CAD (coronary artery disease)     s/p CABG 3/2012-  LIMA to LAD and RSVGs to OM, D, and RPDA    GERD (gastroesophageal reflux disease)     GI bleed 01/01/1991    History of blood transfusion     Hyperlipidemia     Hypertension     NSVT (nonsustained ventricular tachycardia) (H)     Sleep apnea     uses CPAP    Sleep apnea     cpap    Thyroid disease     hyperthyroid     Past Surgical History:   Procedure Laterality Date    ANESTHESIA CARDIOVERSION  07/02/2012    Procedure: ANESTHESIA CARDIOVERSION;  Anesthesia Off site Cardioversion;  Surgeon: Provider, Generic Anesthesia;  Location: [facility] OR    BYPASS GRAFT ARTERY CORONARY  03/08/2012    Procedure:BYPASS GRAFT ARTERY CORONARY; Median Sternotomy, Coronary Artery Bypass Graft X4 Using the Left Internal Mamary and Left Saphenous Vein with MAZE Procedure, and On Pump Oxygenator.; Surgeon:MICHOACANO HOANG; Location:[facility] OR    COLONOSCOPY N/A 08/16/2016    Procedure: COLONOSCOPY;  Surgeon: Brad Peralta MD;  Location:  GI    COLONOSCOPY N/A 11/10/2016    Procedure: COLONOSCOPY;  Surgeon: Brad Peralta MD;  Location:  GI    ESOPHAGOSCOPY, GASTROSCOPY, DUODENOSCOPY (EGD), COMBINED N/A 4/16/2024    Procedure: ESOPHAGOGASTRODUODENOSCOPY;  Surgeon: Hugo Burnette MD;  Location: [facility] OR    MAZE PROCEDURE  03/08/2012    Procedure:MAZE PROCEDURE; Surgeon:MICHOACANO HOANG; Location:[facility] OR    ulcer operation  01/01/1991    [facility] TRANSCATHETER AORTIC VALVE REPLACE (TAVR/LASHAE), W/PROSTH VALVE; TRANSCAVAL APPR  02/01/2024    See [place] Admission from 02/01/2024       Social History:  Social History     Socioeconomic History     Marital status:      Spouse name: Not on file    Number of children: Not on file    Years of education: Not on file    Highest education level: Not on file   Occupational History    Not on file   Tobacco Use    Smoking status: Former     Current packs/day: 0.00     Types: Cigarettes     Quit date: 1983     Years since quittin.3     Passive exposure: Never    Smokeless tobacco: Never   Vaping Use    Vaping status: Never Used   Substance and Sexual Activity    Alcohol use: Yes     Comment: occ wine    Drug use: No    Sexual activity: Yes     Partners: Female   Other Topics Concern    Parent/sibling w/ CABG, MI or angioplasty before 65F 55M? No   Social History Narrative    Not on file     Social Determinants of Health     Financial Resource Strain: Low Risk  (2024)    Financial Resource Strain     Within the past 12 months, have you or your family members you live with been unable to get utilities (heat, electricity) when it was really needed?: No   Food Insecurity: Low Risk  (2024)    Food Insecurity     Within the past 12 months, did you worry that your food would run out before you got money to buy more?: No     Within the past 12 months, did the food you bought just not last and you didn t have money to get more?: No   Transportation Needs: Low Risk  (2024)    Transportation Needs     Within the past 12 months, has lack of transportation kept you from medical appointments, getting your medicines, non-medical meetings or appointments, work, or from getting things that you need?: No   Physical Activity: Not on file   Stress: Not on file   Social Connections: Not on file   Interpersonal Safety: Low Risk  (4/15/2024)    Interpersonal Safety     Do you feel physically and emotionally safe where you currently live?: Yes     Within the past 12 months, have you been hit, slapped, kicked or otherwise physically hurt by someone?: No     Within the past 12 months, have you been humiliated or  "emotionally abused in other ways by your partner or ex-partner?: No   Housing Stability: Low Risk  (2/5/2024)    Housing Stability     Do you have housing? : Yes     Are you worried about losing your housing?: No       Family History:  Family History   Problem Relation Age of Onset    Cardiovascular Unknown         aortic aneurysm, CAD    Cancer Mother 86        pancreatic    C.A.D. Mother         passed away at 86    C.A.D. Brother         stent    Diabetes Maternal Grandmother     Hypertension Brother        Review of Systems:  A complete review of systems reviewed by me is negative with the exeption of what has been mentioned in the history of present illness.        Physical Examination:  Vitals: BP (!) 151/88   Pulse 54   Ht 1.702 m (5' 7\")   Wt 73 kg (161 lb)   SpO2 100%   BMI 25.22 kg/m        Neck Cir (cm): 36 cm      GENERAL APPEARANCE: healthy, alert, no distress, and cooperative  EYES: Eyes grossly normal to inspection, PERRL, conjunctivae slightly pale and sclerae normal, and lids and lashes normal  HENT: soft palate dependent and oral mucous membranes moist  NECK: no adenopathy, no asymmetry, masses, or scars, thyroid normal to palpation, and trachea midline and normal to palpation  RESP: lungs clear to auscultation - no rales, rhonchi or wheezes  CV: regular rates and rhythm and systolic murmur heard best over right sternal border  LYMPHATICS: no cervical adenopathy  MS: extremities normal- no gross deformities noted  NEURO: Normal strength and tone, mentation intact, and speech normal  Mallampati Class: IV.  Tonsillar Stage: 0  surgically removed.         Data: All pertinent previous laboratory data reviewed     Recent Labs   Lab Test 04/29/24  1649 04/17/24  0648    140   POTASSIUM 4.2 3.7   CHLORIDE 108* 108*   CO2 23 23   ANIONGAP 9 9   GLC 87 94   BUN 19.9 24.0*   CR 1.19* 1.10   FLOWER 8.8 8.4*       Recent Labs   Lab Test 04/29/24  1649   WBC 4.6   RBC 3.17*   HGB 10.1*   HCT 30.7*   MCV " "97   MCH 31.9   MCHC 32.9   RDW 13.8          Recent Labs   Lab Test 04/15/24  2303   PROTTOTAL 5.3*   ALBUMIN 3.6   BILITOTAL 0.3   ALKPHOS 44   AST 28   ALT 20       TSH (mU/L)   Date Value   01/15/2016 3.18   05/20/2015 2.12       No results found for: \"UAMP\", \"UBARB\", \"BENZODIAZEUR\", \"UCANN\", \"UCOC\", \"OPIT\", \"UPCP\"    Ferritin   Date/Time Value Ref Range Status   01/15/2016 11:31  26 - 388 ng/mL Final       pH Arterial (pH)   Date Value   03/09/2012 7.33 (L)   03/09/2012 7.31 (L)     pO2 Arterial (mm Hg)   Date Value   03/09/2012 90   03/09/2012 92     pCO2 Arterial (mm Hg)   Date Value   03/09/2012 40   03/09/2012 38     Bicarbonate Arterial (mmol/L)   Date Value   03/09/2012 20 (L)   03/09/2012 18 (L)       @LABRCNTIPR(phv:4,pco2v:4,po2v:4,hco3v:4,era:4,o2per:4)@    Echocardiology: No results found for this or any previous visit (from the past 4320 hour(s)).    Chest x-ray:   XR CHEST 2 VIEWS 04/08/2024    Narrative  EXAM:  DX CHEST AP OR PA AND LATERAL 2 VIEWS    Impression  Comparison with chest radiograph from 1/3/2024. Interval transcatheter aortic valve replacement. Remainder not significantly changed. Median sternotomy with postoperative changes related to coronary artery bypass grafting. Retained  epicardial pacing wires. Atherosclerotic aortic calcification. Bilateral nipple shadows. Trace scarring or atelectasis left lung base. Slight right apical scarring. Slight loss of height of several thoracic vertebral bodies. Mild thoracic spine  hypertrophic changes. Chest is otherwise negative.      Chest CT: No results found for this or any previous visit from the past 365 days.      PFT: Most Recent [facility] Pulmonary Function Testing    No results found for: \"20001\"        Bennett Ezra Goltz, PA-C, PAANDREZ 5/6/2024          "

## 2024-05-07 ENCOUNTER — OFFICE VISIT (OUTPATIENT)
Dept: SLEEP MEDICINE | Facility: CLINIC | Age: 74
End: 2024-05-07
Payer: COMMERCIAL

## 2024-05-07 VITALS
WEIGHT: 161 LBS | OXYGEN SATURATION: 100 % | HEART RATE: 54 BPM | BODY MASS INDEX: 25.27 KG/M2 | HEIGHT: 67 IN | DIASTOLIC BLOOD PRESSURE: 88 MMHG | SYSTOLIC BLOOD PRESSURE: 151 MMHG

## 2024-05-07 DIAGNOSIS — G47.31 PRIMARY CENTRAL SLEEP APNEA: Primary | ICD-10-CM

## 2024-05-07 PROCEDURE — 99205 OFFICE O/P NEW HI 60 MIN: CPT | Performed by: PHYSICIAN ASSISTANT

## 2024-05-07 ASSESSMENT — SLEEP AND FATIGUE QUESTIONNAIRES
HOW LIKELY ARE YOU TO NOD OFF OR FALL ASLEEP WHILE WATCHING TV: SLIGHT CHANCE OF DOZING
HOW LIKELY ARE YOU TO NOD OFF OR FALL ASLEEP WHILE SITTING INACTIVE IN A PUBLIC PLACE: WOULD NEVER DOZE
HOW LIKELY ARE YOU TO NOD OFF OR FALL ASLEEP WHEN YOU ARE A PASSENGER IN A CAR FOR AN HOUR WITHOUT A BREAK: WOULD NEVER DOZE
HOW LIKELY ARE YOU TO NOD OFF OR FALL ASLEEP IN A CAR, WHILE STOPPED FOR A FEW MINUTES IN TRAFFIC: WOULD NEVER DOZE
HOW LIKELY ARE YOU TO NOD OFF OR FALL ASLEEP WHILE SITTING AND TALKING TO SOMEONE: WOULD NEVER DOZE
HOW LIKELY ARE YOU TO NOD OFF OR FALL ASLEEP WHILE SITTING AND READING: SLIGHT CHANCE OF DOZING
HOW LIKELY ARE YOU TO NOD OFF OR FALL ASLEEP WHILE SITTING QUIETLY AFTER LUNCH WITHOUT ALCOHOL: SLIGHT CHANCE OF DOZING
HOW LIKELY ARE YOU TO NOD OFF OR FALL ASLEEP WHILE LYING DOWN TO REST IN THE AFTERNOON WHEN CIRCUMSTANCES PERMIT: SLIGHT CHANCE OF DOZING

## 2024-05-07 NOTE — NURSING NOTE
"Chief Complaint   Patient presents with    Sleep Problem     Reestablish care, need supplies       Initial BP (!) 146/84   Pulse 54   Ht 1.702 m (5' 7\")   Wt 73 kg (161 lb)   SpO2 100%   BMI 25.22 kg/m   Estimated body mass index is 25.22 kg/m  as calculated from the following:    Height as of this encounter: 1.702 m (5' 7\").    Weight as of this encounter: 73 kg (161 lb).    Medication Reconciliation: complete  ESS 4  Neck circumference: 36 centimeters.  Diana Chatterjee MA   "

## 2024-05-08 PROBLEM — N28.9 RENAL LESION: Status: RESOLVED | Noted: 2024-05-01 | Resolved: 2024-05-08

## 2024-05-14 ENCOUNTER — MEDICAL CORRESPONDENCE (OUTPATIENT)
Dept: HEALTH INFORMATION MANAGEMENT | Facility: CLINIC | Age: 74
End: 2024-05-14
Payer: COMMERCIAL

## 2024-05-20 ENCOUNTER — TELEPHONE (OUTPATIENT)
Dept: FAMILY MEDICINE | Facility: CLINIC | Age: 74
End: 2024-05-20

## 2024-05-20 ENCOUNTER — OFFICE VISIT (OUTPATIENT)
Dept: FAMILY MEDICINE | Facility: CLINIC | Age: 74
End: 2024-05-20
Payer: COMMERCIAL

## 2024-05-20 DIAGNOSIS — I50.32 CHRONIC DIASTOLIC (CONGESTIVE) HEART FAILURE (H): ICD-10-CM

## 2024-05-20 DIAGNOSIS — I10 HYPERTENSION GOAL BP (BLOOD PRESSURE) < 140/90: ICD-10-CM

## 2024-05-20 DIAGNOSIS — I48.91 ATRIAL FIBRILLATION, UNSPECIFIED TYPE (H): ICD-10-CM

## 2024-05-20 DIAGNOSIS — N18.2 CKD (CHRONIC KIDNEY DISEASE) STAGE 2, GFR 60-89 ML/MIN: ICD-10-CM

## 2024-05-20 DIAGNOSIS — Z95.2 S/P TAVR (TRANSCATHETER AORTIC VALVE REPLACEMENT): ICD-10-CM

## 2024-05-20 DIAGNOSIS — Z87.19 HISTORY OF GI BLEED: ICD-10-CM

## 2024-05-20 DIAGNOSIS — Q23.81 BICUSPID AORTIC VALVE: ICD-10-CM

## 2024-05-20 DIAGNOSIS — E78.5 HYPERLIPIDEMIA LDL GOAL <70: ICD-10-CM

## 2024-05-20 DIAGNOSIS — Z87.19 HISTORY OF GI BLEED: Primary | ICD-10-CM

## 2024-05-20 DIAGNOSIS — D64.9 ANEMIA, UNSPECIFIED TYPE: ICD-10-CM

## 2024-05-20 LAB
BASOPHILS # BLD AUTO: 0.1 10E3/UL (ref 0–0.2)
BASOPHILS NFR BLD AUTO: 1 %
EOSINOPHIL # BLD AUTO: 0.2 10E3/UL (ref 0–0.7)
EOSINOPHIL NFR BLD AUTO: 3 %
ERYTHROCYTE [DISTWIDTH] IN BLOOD BY AUTOMATED COUNT: 13.4 % (ref 10–15)
HCT VFR BLD AUTO: 33.7 % (ref 40–53)
HGB BLD-MCNC: 10.9 G/DL (ref 13.3–17.7)
IMM GRANULOCYTES # BLD: 0 10E3/UL
IMM GRANULOCYTES NFR BLD: 0 %
LYMPHOCYTES # BLD AUTO: 0.9 10E3/UL (ref 0.8–5.3)
LYMPHOCYTES NFR BLD AUTO: 15 %
MCH RBC QN AUTO: 29.2 PG (ref 26.5–33)
MCHC RBC AUTO-ENTMCNC: 32.3 G/DL (ref 31.5–36.5)
MCV RBC AUTO: 90 FL (ref 78–100)
MONOCYTES # BLD AUTO: 0.6 10E3/UL (ref 0–1.3)
MONOCYTES NFR BLD AUTO: 10 %
NEUTROPHILS # BLD AUTO: 4.1 10E3/UL (ref 1.6–8.3)
NEUTROPHILS NFR BLD AUTO: 71 %
PLATELET # BLD AUTO: 169 10E3/UL (ref 150–450)
RBC # BLD AUTO: 3.73 10E6/UL (ref 4.4–5.9)
WBC # BLD AUTO: 5.8 10E3/UL (ref 4–11)

## 2024-05-20 PROCEDURE — G2211 COMPLEX E/M VISIT ADD ON: HCPCS | Performed by: FAMILY MEDICINE

## 2024-05-20 PROCEDURE — 80048 BASIC METABOLIC PNL TOTAL CA: CPT | Performed by: FAMILY MEDICINE

## 2024-05-20 PROCEDURE — 99215 OFFICE O/P EST HI 40 MIN: CPT | Performed by: FAMILY MEDICINE

## 2024-05-20 PROCEDURE — 36415 COLL VENOUS BLD VENIPUNCTURE: CPT | Performed by: FAMILY MEDICINE

## 2024-05-20 PROCEDURE — 85025 COMPLETE CBC W/AUTO DIFF WBC: CPT | Performed by: FAMILY MEDICINE

## 2024-05-20 NOTE — TELEPHONE ENCOUNTER
Patient Quality Outreach    Patient is due for the following:   Physical Annual Wellness Visit    Next Steps:   Schedule a Annual Wellness Visit    Type of outreach:    Phone, spoke to patient/parent. Scheduled    Next Steps:  Reach out within 90 days via Phone and MyChart.    Max number of attempts reached: No. Will try again in 90 days if patient still on fail list.    Questions for provider review:    None           Aquiles Rosales  Chart routed to Care Team.

## 2024-05-20 NOTE — PATIENT INSTRUCTIONS
-Thank you for choosing the [facility].  -It was a pleasure to see you today.  -Please take a look at the information below for more specific details regarding the treatment plan and recommendations.  -In this after visit summary is a list of your medications and specific instructions.  Please review this carefully as there may be changes made to your medication list.  -If there are any particular questions or concerns, please feel free to reach out to Dr. Saravia.  -If any labs have been completed, we will reach out to you about results.  If the results are normal or not concerning, a letter or [employer] message will be sent to you.  If any follow-up is needed, either Dr. Saravia or the nurse will give you a call.  If you have not heard regarding results after 2 weeks, please reach out to the clinic.    Patient Instructions:    -Dr. Saravia recommends that you see the stomach specialist at [facility] at about 3 months from hospital discharge (to be scheduled around 07/2024). You have the contact information and can make an appointment if you decide to see them for follow-up.  -Continue to take medications as prescribed.  -Continue to follow with the specialists at [facility] as you have planned.  -As discussed, you may slowly increase your physical activity level over the next several weeks to months.  Try to limit/avoid activities that increases your abdominal pressure a lot, such as sit ups, until after completion of the high-dose Prilosec (which would and on 06/16/2024).  -Once you complete the high-dose Prilosec medication on 6/16/2024, please start taking the tapering course of the Prilosec medication (prescribed at today's visit to start on 6/17/24) to help reduce the risk of rebound reflux/discontinuation syndrome.  -Monitor closely for any recurrent bleeding or other issues.    Please seek immediate medical attention (go to the emergency room or urgent care) for the following reasons: worsening  symptoms, or any concerning changes.      --------------------------------------------------------------------------------------------------------------------    -We are always looking for ways to improve.  You may be selected to receive a survey regarding your visit today.  We encourage you to complete the survey and provide specific, constructive feedback to help us improve our processes.  Thank you for your time!  -Please review the contact information listed on the after visit summary and in the electronic chart.  Below is the phone number that we have on file.  If there are any changes that are needed to be made, please reach out to the clinic.  [phone] (home)

## 2024-05-20 NOTE — PROGRESS NOTES
OFFICE VISIT    Assessment/Plan:     Patient Instructions:    -Dr. Saravia recommends that you see the stomach specialist at [facility] at about 3 months from hospital discharge (to be scheduled around 07/2024). You have the contact information and can make an appointment if you decide to see them for follow-up.  -Continue to take medications as prescribed.  -Continue to follow with the specialists at [facility] as you have planned.  -As discussed, you may slowly increase your physical activity level over the next several weeks to months.  Try to limit/avoid activities that increases your abdominal pressure a lot, such as sit ups, until after completion of the high-dose Prilosec (which would and on 06/16/2024).  -Once you complete the high-dose Prilosec medication on 6/16/2024, please start taking the tapering course of the Prilosec medication (prescribed at today's visit to start on 6/17/24) to help reduce the risk of rebound reflux/discontinuation syndrome.  -Monitor closely for any recurrent bleeding or other issues.    Please seek immediate medical attention (go to the emergency room or urgent care) for the following reasons: worsening symptoms, or any concerning changes.      Wiley was seen today for recheck medication and blood draw.  Diagnoses and all orders for this visit:    History of GI bleed  Anemia, unspecified type  Atrial fibrillation, unspecified type (H)  Bicuspid aortic valve  S/P TAVR (transcatheter aortic valve replacement)  Hypertension goal BP (blood pressure) < 140/90  Hyperlipidemia LDL goal <70  Chronic diastolic (congestive) heart failure (H)  CKD (chronic kidney disease) stage 2, GFR 60-89 ml/min: patient doing well. No more melena. Reviewed reasoning for follow up with GI and usually course with PPI. Patient elects to monitor symptomatically and then discontinue the PPI after completion of 3 months of treatment. Discussed potential discontinuation syndrome and a tapering course was prescribed  for patient.   -     CBC with Platelets & Differential; Future  -     Basic metabolic panel; Future  -     omeprazole (PRILOSEC) 20 MG DR capsule; Take 2 capsules (40 mg) by mouth daily for 14 days, THEN 1 capsule (20 mg) daily for 14 days.      Return in about 3 months (around 8/20/2024) for Medication follow up.    The diagnoses, treatment options, risk, benefits, and recommendations were reviewed with patient/guardian.  Questions were answered to patient's/guardian satisfaction.  Red flag signs were reviewed.  Patient/guardian is in agreement with above plan.      Subjective: 73 year old male with history of CABG x4v (2012 at [facility]), bicuspid aortic valve with stenosis (nonrheumatic) s/p TAVR (02/01/2024), chronic diastolic congestive heart failure, atrial fibrillation (post CABG; resolved after cardioversion; not on anticoagulation), hyperlipidemia, hypertension, central sleep apnea on EPAP, hypothyroidism, migraine with aura, CKD stage II, history of tobacco abuse in remission who presents to clinic for the following complaints:   Patient presents with:  Recheck Medication  Blood Draw: Check hemoglobin    Answers submitted by the patient for this visit:  General Questionnaire (Submitted on 5/20/2024)  Chief Complaint: Chronic problems general questions HPI Form  What is the reason for your visit today? : med check    Bloody/black tarry stools have not been noted since the last visit.    Elevated blood pressure without diagnosis of hypertension: Patient typically has normal range blood pressures, though would intermittently have elevated blood pressures.  Patient is not on any blood pressure medications at this time.    At home, BP noted to be 110's/70-80's. Doing well. The BP is higher when coming to see the doctors.     He is sensitive to medications, though not a true allergy.     He felt week for about a week after the MRI with and w/o contrast. He would like to avoid MRI contrast in the future. He  tolerates CT contrast and would like to use the CT scan at [facility] when future imaging is needed. Urination was okay. He did three miles this morning. He feels good. He is including 3 pound weights for weight lifting now. Reviewed recommendations for slow progression of activity levels. He will avoid sit-ups for now. Discussed appropriate physical activities. Before the illnesses, he had been active on a daily basis and he wants to get back into that.     He got a message with [facility] from GI. They were going to keep the referral on file for three years. Discussed indications for referral.       Latest Reference Range & Units 04/16/24 14:25 04/17/24 06:48 04/29/24 16:49   WBC 4.0 - 11.0 10e3/uL   4.6   Hemoglobin 13.3 - 17.7 g/dL 9.2 (L) 8.7 (L) 10.1 (L)   Hematocrit 40.0 - 53.0 %   30.7 (L)   Platelet Count 150 - 450 10e3/uL   167   (L): Data is abnormally low    Narrative & Impression   EXAM: MR RENAL W/O and W CONTRAST  LOCATION: [facility]  DATE: 5/3/2024     INDICATION:  Renal lesion, Lesion of pancreas  COMPARISON: CTA 4/15/2024 and 4/8/2024.  TECHNIQUE: Routine MRI renal protocol including T1 in/out phase, diffusion, multiplane T2, and dynamic T1 with IV contrast.  CONTRAST: Also CT 1/3/2024.     FINDINGS:     RIGHT KIDNEY: A few small scattered benign cysts. Largest is in the central kidney measures 2.0 cm. No follow-up needed for these.     LEFT KIDNEY: Few scattered benign cysts including a 1.7 cm hemorrhagic cyst mid kidney laterally. No enhancement. No concerning features. Kidney otherwise normal.     PANCREAS: Tiny 5 mm cyst in the inferior aspect of the head of the pancreas corresponds to previous CT findings. Most likely benign and of little significance although a side branch IPMN is in the differential.     ADDITIONAL FINDINGS: No significant findings in the liver, pancreas                                                                      IMPRESSION:     1. Benign renal cysts  "including a 1.7 cm hemorrhagic cyst left kidney. No follow-up needed for these.  2. Tiny 5 mm cyst inferior head of the pancreas stable since previous CTs. Most likely of little significance although a side branch IPMN is in the differential. Recommend follow-up MRI in 2 years given previously noted stability.       The 10 point review of system is negative except as stated in the HPI.    Allergies were reviewed and updated.    Objective:   /70   Pulse 52   Temp 97.7  F (36.5  C) (Oral)   Resp 12   Ht 1.702 m (5' 7\")   Wt 71.7 kg (158 lb)   SpO2 99%   BMI 24.75 kg/m     4/17/2024  3:33 AM 4/17/2024  7:53 AM 4/17/2024  11:11 AM 4/29/2024  4:45 PM 5/7/2024  11:00 AM 5/7/2024  11:37 AM 5/20/2024  1:38 PM   Vital Signs          Systolic 107  121  119  122  146 !  151 !  142 !    Diastolic 70  73  64  88  84 !  88 !  87 !    Pulse 54  54  60  56  54   52      General: Active, alert, nontoxic-appearing.  No acute distress.  HEENT: Normocephalic, atraumatic.  Pupils are equal and round.  Sclera is clear.  Normal external ears. Nares patent.  Moist mucous membranes.    Cardiac: RRR.  S1, S2 present.  No murmurs, rubs, or gallops.  Respiratory/chest: Clear to auscultation bilaterally.  No wheezes, rales, rhonchi.  Breathing is not labored.  No accessory muscle usage.  Abdomen: Soft, nondistended, nontender.  No masses or organomegaly noted.  No guarding or rebound tenderness appreciated.  Extremities: Voluntary movements intact.  Integumentary: No concerning rash or skin changes appreciated.    Amount of time spent in chart review, direct patient contact, care coordination, and related activities to patient care on the day of appointment: 40 minutes.       Stew Saravia MD  [facility] [place] [address]  Phone: [phone]  Fax: [phone]    5/22/2024  1:21 PM            Current Outpatient Medications   Medication Sig Dispense Refill    amoxicillin (AMOXIL) 500 MG capsule Take " 4 caps (2000 mg) 1 hour prior to dental procedure.      Blood Pressure Monitoring (B-D ASSURE BPM/AUTO ARM CUFF) MISC 1 Device daily 1 Device 0    Cholecalciferol (VITAMIN D PO) Take 3,000 Units by mouth daily 1 tab daily      CRANBERRY EXTRACT PO Take 2 tablets by mouth daily      ezetimibe (ZETIA) 10 MG tablet Take 1 tablet (10 mg) by mouth daily 90 tablet 1    Multiple Vitamins-Minerals (MULTIVITAL PO) Take by mouth daily 1 TABLET DAILY      [START ON 6/17/2024] omeprazole (PRILOSEC) 20 MG DR capsule Take 2 capsules (40 mg) by mouth daily for 14 days, THEN 1 capsule (20 mg) daily for 14 days. 42 capsule 0    omeprazole (PRILOSEC) 40 MG DR capsule Take 1 capsule (40 mg) by mouth 2 times daily for 60 days 120 capsule 0    Probiotic Product (PROBIOTIC GUMMIES PO) Take 1 tablet by mouth 2 times daily      saw palmetto 450 MG CAPS capsule Take 900 mg by mouth daily      vitamin B complex with vitamin C (VITAMIN  B COMPLEX) tablet Take 1 tablet by mouth daily      vitamin B-12 (CYANOCOBALAMIN) 1000 MCG tablet Take 1,000 mcg by mouth three times a week      vitamin C (ASCORBIC ACID) 1000 MG TABS Take 500 mg by mouth daily.       No current facility-administered medications for this visit.       Allergies   Allergen Reactions    Aspirin      Early 1990s, took one dose of aspirin + ibuprofen and had severe GI bleed - hospitalization required    Ibuprofen      Early 1990s, took one dose of aspirin + ibuprofen and had severe GI bleed -hospitalization required       Patient Active Problem List    Diagnosis Date Noted    Anemia, unspecified type 05/22/2024     Priority: Medium    Lesion of pancreas 05/01/2024     Priority: Medium     Likely a cyst. Recommend repeat MRI in 2 years (05/2026). See imaging below.    Narrative & Impression  EXAM: MR RENAL W/O and W CONTRAST  LOCATION: [facility]  DATE: 5/3/2024     INDICATION:  Renal lesion, Lesion of pancreas  COMPARISON: CTA 4/15/2024 and  4/8/2024.  TECHNIQUE: Routine MRI renal protocol including T1 in/out phase, diffusion, multiplane T2, and dynamic T1 with IV contrast.  CONTRAST: Also CT 1/3/2024.     FINDINGS:     RIGHT KIDNEY: A few small scattered benign cysts. Largest is in the central kidney measures 2.0 cm. No follow-up needed for these.     LEFT KIDNEY: Few scattered benign cysts including a 1.7 cm hemorrhagic cyst mid kidney laterally. No enhancement. No concerning features. Kidney otherwise normal.     PANCREAS: Tiny 5 mm cyst in the inferior aspect of the head of the pancreas corresponds to previous CT findings. Most likely benign and of little significance although a side branch IPMN is in the differential.     ADDITIONAL FINDINGS: No significant findings in the liver, pancreas                                                                      IMPRESSION:     1. Benign renal cysts including a 1.7 cm hemorrhagic cyst left kidney. No follow-up needed for these.  2. Tiny 5 mm cyst inferior head of the pancreas stable since previous CTs. Most likely of little significance although a side branch IPMN is in the differential. Recommend follow-up MRI in 2 years given previously noted stability.      S/P TAVR (transcatheter aortic valve replacement) 04/15/2024     Priority: Medium     Lifelong bacterial endocarditis prophylaxis.      Gastrointestinal hemorrhage with melena 04/15/2024     Priority: Medium    Non-melanoma skin cancer 02/05/2024     Priority: Medium    History of GI bleed 02/05/2024     Priority: Medium     In 1990's after taking aspirin/ibuprofen.      Thrombocytopenia (H24) 02/05/2024     Priority: Medium    Atrial fibrillation, unspecified type (H) 02/05/2024     Priority: Medium    History of transcatheter aortic valve implantation (LASHAE) 02/05/2024     Priority: Medium    Chronic diastolic (congestive) heart failure (H) 02/01/2024     Priority: Medium    Stage 2 chronic kidney disease 02/01/2024     Priority: Medium     Bicuspid aortic valve 05/16/2023     Priority: Medium    History of coronary artery bypass surgery 12/05/2022     Priority: Medium     Last Assessment & Plan:    Formatting of this note might be different from the original.   Fortunately, he is not had symptoms of exertional angina. The patient is aware of the need for Secondary prevention through aggressive CV risk factor modifications to include: blood pressure control, LDL control and daily exercise (per guidelines), etc.      Tobacco abuse, in remission 12/05/2022     Priority: Medium     Last Assessment & Plan:    Formatting of this note might be different from the original.   Fortunately, he is not smoking at this time.      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   Tylenol if needed.      History of atrial fibrillation 07/11/2019     Priority: Medium     Formatting of this note might be different from the original.   3/12:  s/p MAZE during CABG     7/12:  s/p cardioversion  Formatting of this note might be different from the original.   3/12:  s/p MAZE during CABG     7/12:  s/p cardioversion      Rosacea 07/11/2019     Priority: Medium    Tinnitus 07/11/2019     Priority: Medium     Formatting of this note might be different from the original.   2/18:  Audiogram:  Bilateral moderate high frequency sensorineural hearing loss from 7358-4625 Hz with normal hearing 250-2000 Hz. Speech discrimination ability in quiet is excellent at  normal conversational levels. Not a candidate for amplification  Formatting of this note might be different from the original.   2/18:  Audiogram:  Bilateral moderate high frequency sensorineural hearing loss from 3740-4518 Hz with normal hearing 250-2000 Hz. Speech discrimination ability in quiet is excellent at normal conversational levels. Not a candidate for amplification  Formatting of this note might be different from the original.   2/18:  Audiogram:  Bilateral moderate high frequency sensorineural hearing loss from 4787-9160 Hz with normal hearing 250-2000 Hz. Speech discrimination ability in quiet is excellent at normal conversational levels. Not a candidate for amplification  Formatting of this note might be different from the original.   Formatting of this note might be different from the original.    2/18:  Audiogram:  Bilateral moderate high frequency sensorineural hearing loss from 2300-1890 Hz with normal hearing 250-2000 Hz. Speech discrimination ability in quiet is excellent at normal conversational levels. Not a candidate for amplification   Formatting of this note might be different from the original.    2/18:  Audiogram:  Bilateral moderate high frequency sensorineural hearing loss from 7010-3660 Hz with normal hearing 250-2000 Hz. Speech discrimination ability in quiet is excellent at normal conversational levels. Not a candidate for amplification   Formatting of this note might be different from the original.    2/18:  Audiogram:  Bilateral moderate high frequency sensorineural hearing loss from 8695-1482 Hz with normal hearing 250-2000 Hz. Speech discrimination ability in quiet is excellent at normal conversational levels. Not a candidate for amplification      Recurrent UTI 08/01/2018     Priority: Medium     Formatting of this note might be different from the original.   11/17:     12/17:  Triphasic CT Kidneys:  No stones, microlobulated contour of post-inf bladder wall, likely  trabeculations; correlate w/ cysto   1/18:  Cysto:  normal  Formatting of this note might be different from the original.   11/17:     12/17:  Triphasic CT Kidneys:  No stones, microlobulated contour of post-inf bladder wall, likely trabeculations; correlate w/ cysto   1/18:  Cysto:  normal      Primary central sleep apnea 02/07/2017     Priority: Medium     Formatting of this note might be different from the original.   5/31/17:  Sleep study titration:  ASV auto w/ EPAP min 5  max 15; PS min 0  max 15; resp events incl central apneas were controlled at these settings  Formatting of this note might be different from the original.   5/31/17:  Sleep study titration:  ASV auto w/ EPAP min 5  max 15; PS min 0  max 15; resp events incl central apneas were controlled at these settings  Formatting of this note might be different from the original.   Formatting of this note might be different from the original.    5/31/17:  Sleep study titration:  ASV auto w/ EPAP min 5  max 15; PS min 0  max 15; resp events incl central apneas were controlled at these settings   Formatting of this note might be different from the original.    5/31/17:  Sleep study titration:  ASV auto w/ EPAP min 5  max 15; PS min 0  max 15; resp events incl central apneas were controlled at these settings      Advanced directives, counseling/discussion 03/19/2013     Priority: Medium     Advance Care Planning:   ACP Review and Resources Provided:  Reviewed chart for advance care plan.  Wiley Storey has an up to date advance care plan on file. See additional documentation in Problem List and click on code status for document details. Confirmed/documented designated decision maker(s). See permanent comments section of demographics in clinical tab.   Added by Viki Olmedo on 3/19/2013            NSVT (nonsustained ventricular tachycardia) (H)      Priority: Medium    Hyperlipidemia LDL goal <70 03/07/2012     Priority: Medium    Intermediate  coronary syndrome (H) 03/07/2012     Priority: Medium    Abnormal stress electrocardiogram test 03/06/2012     Priority: Medium    ASCVD (arteriosclerotic cardiovascular disease) 03/06/2012     Priority: Medium    Chest discomfort 02/09/2012     Priority: Medium    Hyperthyroidism 02/08/2012     Priority: Medium     Was taking supplements from herbalist with iodine in them -   Treated with methimazole  Avoid IV contrast - iodine or supplements      Hypertension goal BP (blood pressure) < 140/90 02/07/2012     Priority: Medium     On lisinopril -   Started in 2003        Sleep apnea 02/07/2012     Priority: Medium     Sleeps with CPAP -        Atrial fibrillation (H) 02/07/2012     Priority: Medium     New onset 2012 -   Getting ECHO to look at aortic vavle (hx of sclerosis)  On coumadin (hx of ASCVD s/p bypass) from 2012 - 9/2015 (stopped by cardiology)    S/p maze procedure      Aortic sclerosis 10/11/2011     Priority: Medium    Esophageal reflux 10/11/2011     Priority: Medium    Headache 10/11/2011     Priority: Medium     Problem list name updated by automated process. Provider to review      Thyroid nodule 10/11/2011     Priority: Medium     Needs repeat thyroid ultrasound in 2014      Lumbago 11/05/2004     Priority: Medium    Peptic ulcer without hemorrhage or perforation 11/05/2004     Priority: Medium     Formatting of this note might be different from the original.   nSAID-INDUCED         Family History   Problem Relation Age of Onset    Cardiovascular Unknown         aortic aneurysm, CAD    Cancer Mother 86        pancreatic    C.A.D. Mother         passed away at 86    C.A.D. Brother         stent    Diabetes Maternal Grandmother     Hypertension Brother        Past Surgical History:   Procedure Laterality Date    ANESTHESIA CARDIOVERSION  07/02/2012    Procedure: ANESTHESIA CARDIOVERSION;  Anesthesia Off site Cardioversion;  Surgeon: Provider, Generic Anesthesia;  Location: [facility] OR    BYPASS GRAFT ARTERY  CORONARY  2012    Procedure:BYPASS GRAFT ARTERY CORONARY; Median Sternotomy, Coronary Artery Bypass Graft X4 Using the Left Internal Mamary and Left Saphenous Vein with MAZE Procedure, and On Pump Oxygenator.; Surgeon:MICHOACANO HOANG; Location:[facility] OR    COLONOSCOPY N/A 2016    Procedure: COLONOSCOPY;  Surgeon: Brad Peralta MD;  Location:  GI    COLONOSCOPY N/A 11/10/2016    Procedure: COLONOSCOPY;  Surgeon: Brad Peralta MD;  Location:  GI    ESOPHAGOSCOPY, GASTROSCOPY, DUODENOSCOPY (EGD), COMBINED N/A 2024    Procedure: ESOPHAGOGASTRODUODENOSCOPY;  Surgeon: Hugo Burnette MD;  Location[facility]    MAZE PROCEDURE  2012    Procedure:MAZE PROCEDURE; Surgeon:MICHOACANO HOANG; Location: OR    TONSILLECTOMY      ulcer operation  1991    ZZC TRANSCATHETER AORTIC VALVE REPLACE (TAVR/LASHAE), W/PROSTH VALVE; TRANSCAVAL APPR  2024    See [place] Admission from 2024        Social History     Socioeconomic History    Marital status:      Spouse name: Not on file    Number of children: Not on file    Years of education: Not on file    Highest education level: Not on file   Occupational History    Not on file   Tobacco Use    Smoking status: Former     Current packs/day: 0.00     Types: Cigarettes     Quit date: 1983     Years since quittin.4     Passive exposure: Never    Smokeless tobacco: Never   Vaping Use    Vaping status: Never Used   Substance and Sexual Activity    Alcohol use: Yes     Comment: occ wine on weekends, none since     Drug use: No    Sexual activity: Yes     Partners: Female   Other Topics Concern    Parent/sibling w/ CABG, MI or angioplasty before 65F 55M? No   Social History Narrative    Not on file     Social Determinants of Health     Financial Resource Strain: Low Risk  (2024)    Financial Resource Strain     Within the past 12 months, have you or your family members you live with been unable to get utilities (heat,  electricity) when it was really needed?: No   Food Insecurity: Low Risk  (2/5/2024)    Food Insecurity     Within the past 12 months, did you worry that your food would run out before you got money to buy more?: No     Within the past 12 months, did the food you bought just not last and you didn t have money to get more?: No   Transportation Needs: Low Risk  (2/5/2024)    Transportation Needs     Within the past 12 months, has lack of transportation kept you from medical appointments, getting your medicines, non-medical meetings or appointments, work, or from getting things that you need?: No   Physical Activity: Not on file   Stress: Not on file   Social Connections: Not on file   Interpersonal Safety: Low Risk  (5/20/2024)    Interpersonal Safety     Do you feel physically and emotionally safe where you currently live?: Yes     Within the past 12 months, have you been hit, slapped, kicked or otherwise physically hurt by someone?: No     Within the past 12 months, have you been humiliated or emotionally abused in other ways by your partner or ex-partner?: No   Housing Stability: Low Risk  (2/5/2024)    Housing Stability     Do you have housing? : Yes     Are you worried about losing your housing?: No

## 2024-05-21 LAB
ANION GAP SERPL CALCULATED.3IONS-SCNC: 10 MMOL/L (ref 7–15)
BUN SERPL-MCNC: 16.8 MG/DL (ref 8–23)
CALCIUM SERPL-MCNC: 8.9 MG/DL (ref 8.8–10.2)
CHLORIDE SERPL-SCNC: 107 MMOL/L (ref 98–107)
CREAT SERPL-MCNC: 1.1 MG/DL (ref 0.67–1.17)
DEPRECATED HCO3 PLAS-SCNC: 23 MMOL/L (ref 22–29)
EGFRCR SERPLBLD CKD-EPI 2021: 70 ML/MIN/1.73M2
GLUCOSE SERPL-MCNC: 85 MG/DL (ref 70–99)
POTASSIUM SERPL-SCNC: 4.2 MMOL/L (ref 3.4–5.3)
SODIUM SERPL-SCNC: 140 MMOL/L (ref 135–145)

## 2024-05-22 VITALS
DIASTOLIC BLOOD PRESSURE: 70 MMHG | HEIGHT: 67 IN | OXYGEN SATURATION: 99 % | HEART RATE: 52 BPM | SYSTOLIC BLOOD PRESSURE: 130 MMHG | RESPIRATION RATE: 12 BRPM | TEMPERATURE: 97.7 F | BODY MASS INDEX: 24.8 KG/M2 | WEIGHT: 158 LBS

## 2024-05-22 PROBLEM — D64.9 ANEMIA, UNSPECIFIED TYPE: Status: ACTIVE | Noted: 2024-05-22

## 2024-06-22 ENCOUNTER — HEALTH MAINTENANCE LETTER (OUTPATIENT)
Age: 74
End: 2024-06-22

## 2024-09-03 ENCOUNTER — OFFICE VISIT (OUTPATIENT)
Dept: FAMILY MEDICINE | Facility: CLINIC | Age: 74
End: 2024-09-03
Payer: COMMERCIAL

## 2024-09-03 VITALS
OXYGEN SATURATION: 99 % | HEART RATE: 62 BPM | BODY MASS INDEX: 23.86 KG/M2 | WEIGHT: 152.04 LBS | HEIGHT: 67 IN | TEMPERATURE: 97.2 F | SYSTOLIC BLOOD PRESSURE: 162 MMHG | DIASTOLIC BLOOD PRESSURE: 80 MMHG | RESPIRATION RATE: 20 BRPM

## 2024-09-03 DIAGNOSIS — I50.32 CHRONIC DIASTOLIC (CONGESTIVE) HEART FAILURE (H): ICD-10-CM

## 2024-09-03 DIAGNOSIS — G47.33 OSA (OBSTRUCTIVE SLEEP APNEA): ICD-10-CM

## 2024-09-03 DIAGNOSIS — E78.5 HYPERLIPIDEMIA LDL GOAL <70: ICD-10-CM

## 2024-09-03 DIAGNOSIS — Z95.2 HISTORY OF TRANSCATHETER AORTIC VALVE IMPLANTATION (TAVI): ICD-10-CM

## 2024-09-03 DIAGNOSIS — Z95.1 HISTORY OF CORONARY ARTERY BYPASS SURGERY: ICD-10-CM

## 2024-09-03 DIAGNOSIS — I10 HYPERTENSION GOAL BP (BLOOD PRESSURE) < 140/90: ICD-10-CM

## 2024-09-03 DIAGNOSIS — E72.12 METHYLENETETRAHYDROFOLATE REDUCTASE (MTHFR) DEFICIENCY (H): ICD-10-CM

## 2024-09-03 DIAGNOSIS — Z00.00 ENCOUNTER FOR MEDICARE ANNUAL WELLNESS EXAM: Primary | ICD-10-CM

## 2024-09-03 DIAGNOSIS — I48.91 ATRIAL FIBRILLATION, UNSPECIFIED TYPE (H): ICD-10-CM

## 2024-09-03 DIAGNOSIS — D64.9 ANEMIA, UNSPECIFIED TYPE: ICD-10-CM

## 2024-09-03 DIAGNOSIS — H91.93 DECREASED HEARING OF BOTH EARS: ICD-10-CM

## 2024-09-03 DIAGNOSIS — Z87.19 HISTORY OF GI BLEED: ICD-10-CM

## 2024-09-03 LAB — HGB BLD-MCNC: 13.3 G/DL (ref 13.3–17.7)

## 2024-09-03 PROCEDURE — 36415 COLL VENOUS BLD VENIPUNCTURE: CPT | Performed by: FAMILY MEDICINE

## 2024-09-03 PROCEDURE — 99214 OFFICE O/P EST MOD 30 MIN: CPT | Mod: 25 | Performed by: FAMILY MEDICINE

## 2024-09-03 PROCEDURE — G0438 PPPS, INITIAL VISIT: HCPCS | Performed by: FAMILY MEDICINE

## 2024-09-03 PROCEDURE — 85018 HEMOGLOBIN: CPT | Performed by: FAMILY MEDICINE

## 2024-09-03 NOTE — PATIENT INSTRUCTIONS
-Thank you for choosing the [facility].  -It was a pleasure to see you today.  -Please take a look at the information below for more specific details regarding the treatment plan and recommendations.  -In this after visit summary is a list of your medications and specific instructions.  Please review this carefully as there may be changes made to your medication list.  -If there are any particular questions or concerns, please feel free to reach out to Dr. Saravia.  -If any labs have been completed, we will reach out to you about results.  If the results are normal or not concerning, a letter or MyChart message will be sent to you.  If any follow-up is needed, either Dr. Saravia or the nurse will give you a call.  If you have not heard regarding results after 2 weeks, please reach out to the clinic.    Patient Instructions:    -You are doing great.  -Continue to work with the sleep doctor's team to find a good fitting mask.   -Continue to exercise and increase as you plan.   -Continue the blood pressure monitoring.  If the blood pressure does not return to normal after a few months of regular exercise and healthy eating as well as weight loss, please reach out to Dr. Saravia.  -Continue to eat well.  Try to increase your servings of calcium as this can help your bones stay strong and healthy.  Follow a nutrition plan rich in fruits and vegetables and low in fats and cholesterol.    -Be sure to eat 5-7 servings of fruits and vegetables each day.  -Find ways to stay active.  Try to get 150 minutes of moderate activity (where you are breathing faster and slightly sweating) each week.  -Try to maintain a body mass index (BMI) of 18.5-25 as this is considered a healthier weight range.  -Brush your teeth twice daily.  See a dentist every 6-12 months.  -Be sure to use sunblock with SPF 15 or greater when going outside for extended periods of time.  Sunblock should be used even when it is a cloudy day.  Do  intermittent skin checks for any concerning skin changes.  Wearing a wide brimmed hat and sunglasses can also be helpful to protect your skin from the sun.  -Monitor for any abnormal skin changes (such as new moles/spots, painful moles, changes in your old moles, wounds that will not heal, multiple colors noted in one lesion, lesions that are asymmetric or not circular, or anything that is concerning for you). If any of these are noted, please schedule an appointment to be seen.   -Continue to follow a diet rich in fruits and vegetables and low in fats and cholesterol.    -It is generally recommended for you to complete a health care directive or living will. These documents will be able to reflect your wishes and desire in the case that you are unable to express them yourself. Please let Dr. Saravia know if you would like some assistance with this process.    Please seek immediate medical attention (go to the emergency room or urgent care) for the following reasons: worsening symptoms, or any concerning changes.      --------------------------------------------------------------------------------------------------------------------    -We are always looking for ways to improve.  You may be selected to receive a survey regarding your visit today.  We encourage you to complete the survey and provide specific, constructive feedback to help us improve our processes.  Thank you for your time!  -Please review the contact information listed on the after visit summary and in the electronic chart.  Below is the phone number that we have on file.  If there are any changes that are needed to be made, please reach out to the clinic.  [phone] (home)     Patient Education   Preventive Care Advice   This is general advice given by our system to help you stay healthy. However, your care team may have specific advice just for you. Please talk to your care team about your preventive care needs.  Nutrition  Eat 5 or more servings  of fruits and vegetables each day.  Try wheat bread, brown rice and whole grain pasta (instead of white bread, rice, and pasta).  Get enough calcium and vitamin D. Check the label on foods and aim for 100% of the RDA (recommended daily allowance).  Lifestyle  Exercise at least 150 minutes each week  (30 minutes a day, 5 days a week).  Do muscle strengthening activities 2 days a week. These help control your weight and prevent disease.  No smoking.  Wear sunscreen to prevent skin cancer.  Have a dental exam and cleaning every 6 months.  Yearly exams  See your health care team every year to talk about:  Any changes in your health.  Any medicines your care team has prescribed.  Preventive care, family planning, and ways to prevent chronic diseases.  Shots (vaccines)   HPV shots (up to age 26), if you've never had them before.  Hepatitis B shots (up to age 59), if you've never had them before.  COVID-19 shot: Get this shot when it's due.  Flu shot: Get a flu shot every year.  Tetanus shot: Get a tetanus shot every 10 years.  Pneumococcal, hepatitis A, and RSV shots: Ask your care team if you need these based on your risk.  Shingles shot (for age 50 and up)  General health tests  Diabetes screening:  Starting at age 35, Get screened for diabetes at least every 3 years.  If you are younger than age 35, ask your care team if you should be screened for diabetes.  Cholesterol test: At age 39, start having a cholesterol test every 5 years, or more often if advised.  Bone density scan (DEXA): At age 50, ask your care team if you should have this scan for osteoporosis (brittle bones).  Hepatitis C: Get tested at least once in your life.  STIs (sexually transmitted infections)  Before age 24: Ask your care team if you should be screened for STIs.  After age 24: Get screened for STIs if you're at risk. You are at risk for STIs (including HIV) if:  You are sexually active with more than one person.  You don't use condoms every  time.  You or a partner was diagnosed with a sexually transmitted infection.  If you are at risk for HIV, ask about PrEP medicine to prevent HIV.  Get tested for HIV at least once in your life, whether you are at risk for HIV or not.  Cancer screening tests  Cervical cancer screening: If you have a cervix, begin getting regular cervical cancer screening tests starting at age 21.  Breast cancer scan (mammogram): If you've ever had breasts, begin having regular mammograms starting at age 40. This is a scan to check for breast cancer.  Colon cancer screening: It is important to start screening for colon cancer at age 45.  Have a colonoscopy test every 10 years (or more often if you're at risk) Or, ask your provider about stool tests like a FIT test every year or Cologuard test every 3 years.  To learn more about your testing options, visit:   .  For help making a decision, visit:   https://bit.ly/ny77215.  Prostate cancer screening test: If you have a prostate, ask your care team if a prostate cancer screening test (PSA) at age 55 is right for you.  Lung cancer screening: If you are a current or former smoker ages 50 to 80, ask your care team if ongoing lung cancer screenings are right for you.  For informational purposes only. Not to replace the advice of your health care provider. Copyright   2023 [facility]. All rights reserved. Clinically reviewed by the [facility]. [employer] 800718 - REV 01/24.  Hearing Loss: Care Instructions  Overview     Hearing loss is a sudden or slow decrease in how well you hear. It can range from slight to profound. Permanent hearing loss can occur with aging. It also can happen when you are exposed long-term to loud noise. Examples include listening to loud music, riding motorcycles, or being around other loud machines.  Hearing loss can affect your work and home life. It can make you feel lonely or depressed. You may feel that you have lost your  independence. But hearing aids and other devices can help you hear better and feel connected to others.  Follow-up care is a key part of your treatment and safety. Be sure to make and go to all appointments, and call your doctor if you are having problems. It's also a good idea to know your test results and keep a list of the medicines you take.  How can you care for yourself at home?  Avoid loud noises whenever possible. This helps keep your hearing from getting worse.  Always wear hearing protection around loud noises.  Wear a hearing aid as directed.  A professional can help you pick a hearing aid that will work best for you.  You can also get hearing aids over the counter for mild to moderate hearing loss.  Have hearing tests as your doctor suggests. They can show whether your hearing has changed. Your hearing aid may need to be adjusted.  Use other devices as needed. These may include:  Telephone amplifiers and hearing aids that can connect to a television, stereo, radio, or microphone.  Devices that use lights or vibrations. These alert you to the doorbell, a ringing telephone, or a baby monitor.  Television closed-captioning. This shows the words at the bottom of the screen. Most new TVs can do this.  TTY (text telephone). This lets you type messages back and forth on the telephone instead of talking or listening. These devices are also called TDD. When messages are typed on the keyboard, they are sent over the phone line to a receiving TTY. The message is shown on a monitor.  Use text messaging, social media, and email if it is hard for you to communicate by telephone.  Try to learn a listening technique called speechreading. It is not lipreading. You pay attention to people's gestures, expressions, posture, and tone of voice. These clues can help you understand what a person is saying. Face the person you are talking to, and have them face you. Make sure the lighting is good. You need to see the other  "person's face clearly.  Think about counseling if you need help to adjust to your hearing loss.  When should you call for help?  Watch closely for changes in your health, and be sure to contact your doctor if:    You think your hearing is getting worse.     You have new symptoms, such as dizziness or nausea.   Where can you learn more?  Go to https://www.[employer].net/patiented  Enter R798 in the search box to learn more about \"Hearing Loss: Care Instructions.\"  Current as of: September 27, 2023               Content Version: 14.0    5611-2629 [employer].   Care instructions adapted under license by your healthcare professional. If you have questions about a medical condition or this instruction, always ask your healthcare professional. [employer] disclaims any warranty or liability for your use of this information.         "

## 2024-09-03 NOTE — PROGRESS NOTES
Preventive Care Visit  [facility] TATIANA Saravia MD, Family Medicine  Sep 3, 2024      SUBJECTIVE:   Wiley is a 73 year old, presenting for the following:  Wellness Visit and LAB REQUEST (Hgb)        9/3/2024     2:12 PM   Additional Questions   Roomed by BRANDY PEPPER CMA   Accompanied by NONE     Are you in the first 12 months of your Medicare coverage?  No    HPI    Today's PHQ-2 Score:       9/3/2024     1:58 PM   PHQ-2 ( 1999 Pfizer)   Q1: Little interest or pleasure in doing things 0   Q2: Feeling down, depressed or hopeless 0   PHQ-2 Score 0   Q1: Little interest or pleasure in doing things Not at all   Q2: Feeling down, depressed or hopeless Not at all   PHQ-2 Score 0       Have you ever done Advance Care Planning? (For example, a Health Directive, POLST, or a discussion with a medical provider or your loved ones about your wishes): No, advance care planning information given to patient to review.  Advanced care planning was discussed at today's visit. Please see AVS.    Referring patient to audiology. Hearing is reduced.      Fall risk  Low risk      Cognitive Screening   No concerns.     Mini-CogTM Copyright S Quintin. Licensed by the author for use in [facility]; reprinted with permission (soob@.[email or web address]). All rights reserved.      Do you have sleep apnea, excessive snoring or daytime drowsiness? : no    Denies any history of recurrent melena/GI bleed.    HTN: the BP have been higher the last few weeks. He started gaining weight on the omeprazole. He went from 153 to 170 pounds. He cut back on a couple of meals and cut out snacks and the weight went back down, though the BP went back up. On recheck, BP is 162/80.     Migraines: hasn't had any since the surgery in Feb 2024.Had life long migraines after that.     Hearing concerns: would like to see specialist. Hearing is going bad on him for several years now.     He went on a hike and everything went good. He went to the gym  everyday and did good, too.     Sleep apnea: can't seem to find a mask nataliya fits him. He went to the dentist and had another cavity. Encouraged patient to find a well fitting mask for the CPAP treatment. Discussed that it could be related to the elevated BP.     Homocystinuria (MTHFR deficiency): Stable.    -Reviewed healthy eating and exercise.  -Skin cancer screening: No concerning skin changes expressed by patient.  -Smoking status:   Tobacco Use      Smoking status: Former        Packs/day: 0.00        Types: Cigarettes        Quit date: 1983        Years since quittin.7        Passive exposure: Never      Smokeless tobacco: Never      -Family history: no changes noted.   Family History   Problem Relation Age of Onset    Cardiovascular Unknown         aortic aneurysm, CAD    Cancer Mother 86        pancreatic    C.A.D. Mother         passed away at 86    C.A.D. Brother         stent    Diabetes Maternal Grandmother     Hypertension Brother        Preventative health recommendations, evaluation options, and risk/benefits of each were discussed with patient. Accepted recommendations were ordered. Otherwise, patient declined.  Health Maintenance Due   Topic Date Due    HF ACTION PLAN  Never done    RSV VACCINE (1 - 1-dose 60+ series) Never done    INFLUENZA VACCINE (1) 2024    COVID-19 Vaccine (7 - - season) 2024       -Immunizations due were reviewed.    Immunization History   Administered Date(s) Administered    COVID-19 12+ (-) (MODERNA) 09/15/2023    COVID-19 Monovalent 18+ (Moderna) 2021, 2021, 2021, 2022, 10/01/2022    Flu, Unspecified 10/01/2023    HepB, Unspecified 10/27/1993    Hepatitis B, Adult 1993, 1993    Influenza (High Dose) Trivalent,PF (Fluzone) 2015, 2016, 11/10/2017, 10/12/2018    Influenza (IIV3) PF 2007, 10/07/2009, 10/12/2010, 2011, 10/08/2012, 11/15/2013    Influenza Vaccine, 6+MO IM (QUADRIVALENT  W/PRESERVATIVES) 11/28/2014, 09/01/2021, 10/01/2022    Influenza,INJ,MDCK,PF, IIV3 18+yrs 11/14/2013    Meningococcal ACWY (Menactra ) 06/09/2009    Pneumo Conj 13-V (2010&after) 01/29/2017    Pneumococcal 20 valent Conjugate (Prevnar 20) 02/19/2023    Pneumococcal 23 valent 11/15/2013    Pneumococcal PCV 11/15/2013, 02/19/2023    Poliovirus, inactivated (IPV) 06/18/2008    TDAP (Adacel,Boostrix) 02/05/2007, 08/01/2023    Td (Adult), Adsorbed 11/11/2003    Twinrix A/B 06/18/2008, 06/25/2008, 07/10/2008, 05/19/2009    Typhoid IM 06/18/2008    Yellow Fever 06/12/2009    Zoster recombinant adjuvanted (SHINGRIX) 04/11/2023, 08/01/2023    Zoster vaccine, live 01/28/2015       -Labs: Laboratory recommendations reviewed with patient.  Testing completed in 04/2024 and 05/2024.    -Colon cancer screening: Last colonoscopy as below.  Cologuard completed on 01/31/2023.  Due for repeat in 3 years.    COLONOSCOPY 11/10/2016 10:28 AM Rad Rslts   [facility] Endoscopy Department  _______________________________________________________________________________  Patient Name: Wiley Storey        Procedure Date: 11/10/2016 10:28 AM  MRN: [id number]                       Account Number: [id number]  YOB: 1950              Admit Type: Outpatient  Age: 66                               Room: 1                          Note Status: Finalized                Attending MD: Brad Peralta MD  Instrument Name: 324 [id number] AdultColonoscope  _______________________________________________________________________________     Procedure:           Colonoscopy  Indications:         Screening for colorectal malignant neoplasm  Providers:           Brad Peralta MD, Yessica Edwards, SHARYN  Patient Profile:     This is a 66 year old male.  Referring MD:        Trisha Donato DO  Medicines:           Midazolam 2 mg IV, Fentanyl 100 micrograms IV  Complications:       No immediate  complications.  _______________________________________________________________________________  Procedure:           Pre-Anesthesia Assessment:                       - Prior to the procedure, a History and Physical was                       performed, and patient medications, allergies and                       sensitivities were reviewed. The patient's tolerance of                       previous anesthesia was reviewed.                       - The risks and benefits of the procedure and the                       sedation options and risks were discussed with the                       patient. All questions were answered and informed                       consent was obtained.                       - Patient identification and proposed procedure were                       verified prior to the procedure by the physician. The                       procedure was verified in the procedure room.                       - Pre-procedure physical examination revealed no                       contraindications to sedation.                       After obtaining informed consent, the colonoscope was                       passed under direct vision. Throughout the procedure,                       the patient's blood pressure, pulse, and oxygen                       saturations were monitored continuously. The Colonoscope                       was introduced through the anus and advanced to the                       terminal ileum. The colonoscopy was performed without                       difficulty. The patient tolerated the procedure well.                       The quality of the bowel preparation was fair.                                                                                   Findings:       The perianal and digital rectal examinations were normal. Pertinent       negatives include normal sphincter tone, no palpable rectal lesions and       normal prostate (size, shape, and consistency).       The terminal ileum  appeared normal.       Multiple medium-mouthed diverticula were found in the sigmoid colon, in       the descending colon and in the ascending colon.       The exam was otherwise without abnormality on direct and retroflexion       views.                                                                                   Impression:          - The examined portion of the ileum was normal.                       - Diverticulosis in the sigmoid colon, in the descending                       colon and in the ascending colon.                       - The examination was otherwise normal on direct and                       retroflexion views.  Recommendation:      - Discharge patient to home (ambulatory).                       - Return to referring physician PRN.                       - Repeat colonoscopy in 7 years for screening purposes.           -Abdominal Aortic Aneurysm Screening: men aged 65 to 75 years who have ever smoked.    Reviewed and updated as needed this visit by clinical staff   Tobacco  Allergies  Meds  Problems             Reviewed and updated as needed this visit by Provider     Meds  Problems             Social History     Tobacco Use    Smoking status: Former     Current packs/day: 0.00     Types: Cigarettes     Quit date: 1983     Years since quittin.7     Passive exposure: Never    Smokeless tobacco: Never   Substance Use Topics    Alcohol use: Yes     Comment: occ wine on weekends, none since 11/23           9/3/2024     1:57 PM   Alcohol Use   Prescreen: >3 drinks/day or >7 drinks/week? Not Applicable       Current providers sharing in care for this patient include:   Patient Care Team:  Stew Saravia MD as PCP - General (Family Medicine)  Jade Tolentino as PCP  Stew Saravia MD as Assigned PCP  Goltz, Bennett Ezra, PA-C as Assigned Neuroscience Provider    The following health maintenance items are reviewed in Epic and correct as of today:  Health Maintenance   Topic Date Due     "HF ACTION PLAN  Never done    RSV VACCINE (1 - 1-dose 60+ series) Never done    INFLUENZA VACCINE (1) 09/01/2024    COVID-19 Vaccine (7 - 2023-24 season) 09/01/2024    BMP  11/20/2024    ALT  04/15/2025    LIPID  04/29/2025    MICROALBUMIN  04/29/2025    ANNUAL REVIEW OF HM ORDERS  04/29/2025    CBC  05/20/2025    MEDICARE ANNUAL WELLNESS VISIT  09/03/2025    FALL RISK ASSESSMENT  09/03/2025    COLORECTAL CANCER SCREENING  11/10/2026    GLUCOSE  05/20/2027    ADVANCE CARE PLANNING  09/03/2029    DTAP/TDAP/TD IMMUNIZATION (3 - Td or Tdap) 08/01/2033    TSH W/FREE T4 REFLEX  Completed    HEPATITIS C SCREENING  Completed    PHQ-2 (once per calendar year)  Completed    Pneumococcal Vaccine: 65+ Years  Completed    URINALYSIS  Completed    ZOSTER IMMUNIZATION  Completed    AORTIC ANEURYSM SCREENING (SYSTEM ASSIGNED)  Completed    HPV IMMUNIZATION  Aged Out    MENINGITIS IMMUNIZATION  Aged Out    RSV MONOCLONAL ANTIBODY  Aged Out       Review of Systems   The 10 point review of system was negative unless otherwise stated in the HPI.      OBJECTIVE:   BP (!) 162/80 (BP Location: Left arm, Patient Position: Sitting, Cuff Size: Adult Regular)   Pulse 62   Temp 97.2  F (36.2  C) (Oral)   Resp 20   Ht 1.702 m (5' 7\")   Wt 69 kg (152 lb 0.6 oz)   SpO2 99%   BMI 23.81 kg/m     Estimated body mass index is 23.81 kg/m  as calculated from the following:    Height as of this encounter: 1.702 m (5' 7\").    Weight as of this encounter: 69 kg (152 lb 0.6 oz).  Physical Exam  GENERAL: alert and no distress  EYES: Eyes grossly normal to inspection, PERRL and conjunctivae and sclerae normal  HENT: ear canals and TM's normal, nose and mouth without ulcers or lesions  NECK: no adenopathy, no asymmetry, masses, or scars  RESP: lungs clear to auscultation - no rales, rhonchi or wheezes  CV: regular rate and rhythm (has history of a fib), normal S1 S2, no S3 or S4, no murmur, click or rub, no peripheral edema  ABDOMEN: soft, nontender, " no hepatosplenomegaly, no masses and bowel sounds normal  MS: no gross musculoskeletal defects noted, no edema  SKIN: no suspicious lesions or rashes  NEURO: Normal strength and tone, mentation intact and speech normal  PSYCH: mentation appears normal, affect normal/bright    Diagnostic Test Results:  Labs reviewed in Epic    ASSESSMENT / PLAN:       Patient Instructions:    -You are doing great.  -Continue to work with the sleep doctor's team to find a good fitting mask.   -Continue to exercise and increase as you plan.   -Continue the blood pressure monitoring.  If the blood pressure does not return to normal after a few months of regular exercise and healthy eating as well as weight loss, please reach out to Dr. Saravia.  -Continue to eat well.  Try to increase your servings of calcium as this can help your bones stay strong and healthy.  Follow a nutrition plan rich in fruits and vegetables and low in fats and cholesterol.    -Be sure to eat 5-7 servings of fruits and vegetables each day.  -Find ways to stay active.  Try to get 150 minutes of moderate activity (where you are breathing faster and slightly sweating) each week.  -Try to maintain a body mass index (BMI) of 18.5-25 as this is considered a healthier weight range.  -Brush your teeth twice daily.  See a dentist every 6-12 months.  -Be sure to use sunblock with SPF 15 or greater when going outside for extended periods of time.  Sunblock should be used even when it is a cloudy day.  Do intermittent skin checks for any concerning skin changes.  Wearing a wide brimmed hat and sunglasses can also be helpful to protect your skin from the sun.  -Monitor for any abnormal skin changes (such as new moles/spots, painful moles, changes in your old moles, wounds that will not heal, multiple colors noted in one lesion, lesions that are asymmetric or not circular, or anything that is concerning for you). If any of these are noted, please schedule an appointment to be  seen.   -Continue to follow a diet rich in fruits and vegetables and low in fats and cholesterol.    -It is generally recommended for you to complete a health care directive or living will. These documents will be able to reflect your wishes and desire in the case that you are unable to express them yourself. Please let Dr. Saravia know if you would like some assistance with this process.    Please seek immediate medical attention (go to the emergency room or urgent care) for the following reasons: worsening symptoms, or any concerning changes.      Wiley was seen today for wellness visit and lab request.  Diagnoses and all orders for this visit:    Encounter for Medicare annual wellness exam    Decreased hearing of both ears  -     Pediatric Audiology  Referral; Future    Anemia, unspecified type  -     Hemoglobin; Future    Hypertension goal BP (blood pressure) < 140/90  ADELIA (obstructive sleep apnea)  Chronic diastolic (congestive) heart failure (H)  Atrial fibrillation, unspecified type (H)  History of coronary artery bypass surgery  History of GI bleed  History of transcatheter aortic valve implantation (LASHAE)  Hyperlipidemia LDL goal <70: overall stable. Patient will continue to exercise and eat healthy over the next several months. If BP remains elevated afterwards, plan to start BP medication. Continue other medications as prescribed.     Methylenetetrahydrofolate reductase (MTHFR) deficiency (H24): stable. Continue to monitor.     Other orders  -     PRIMARY CARE FOLLOW-UP SCHEDULING; Future          Patient has been advised of split billing requirements and indicates understanding: Yes      Counseling  Reviewed preventive health recommendations.        He reports that he quit smoking about 41 years ago. His smoking use included cigarettes. He has never been exposed to tobacco smoke. He has never used smokeless tobacco.      Appropriate preventive services were discussed with this patient, including  applicable screening as appropriate for fall prevention, nutrition, physical activity, Tobacco-use cessation, weight loss and cognition.  Checklist reviewing preventive services available has been given to the patient.      Signed Electronically by:       Stew Saravia MD  [facility] [place] [address]  Phone: [phone]  Fax: [phone]    9/6/2024  7:41 AM

## 2024-09-09 ENCOUNTER — TELEPHONE (OUTPATIENT)
Dept: FAMILY MEDICINE | Facility: CLINIC | Age: 74
End: 2024-09-09
Payer: COMMERCIAL

## 2024-09-09 NOTE — TELEPHONE ENCOUNTER
Reason for Call:  Appointment Request    Patient requesting this type of appt:  VACCINE (HGIH DOSE FOR FLU AND THEN PFIZER FOR COVID)    Requested provider: MA/VF/LPN Visit    Reason patient unable to be scheduled: Needs to be scheduled by clinic    When does patient want to be seen/preferred time: PT WANTS TO RESCHEDULE COVID AND FLU SHOT APPT SET FOR 10/03/2024 TO SOMETIME AFTER 10/05/2024    Comments: PT WANTS TO RESCHEDULE COVID AND FLU SHOT APPT SET FOR 10/03/2024 TO SOMETIME AFTER 10/05/2024    Could we send this information to you in [id number] or would you prefer to receive a phone call?:   Patient would prefer a phone call, OR [facility]   Okay to leave a detailed message?: Yes at Cell number on file:    Telephone Information:   Mobile [phone]       Call taken on 9/9/2024 at 3:03 PM by Nancy Epps

## 2024-09-10 ENCOUNTER — OFFICE VISIT (OUTPATIENT)
Dept: AUDIOLOGY | Facility: CLINIC | Age: 74
End: 2024-09-10
Payer: COMMERCIAL

## 2024-09-10 DIAGNOSIS — H90.3 SENSORINEURAL HEARING LOSS (SNHL), BILATERAL: Primary | ICD-10-CM

## 2024-09-10 DIAGNOSIS — H91.93 DECREASED HEARING OF BOTH EARS: ICD-10-CM

## 2024-09-10 PROCEDURE — 92557 COMPREHENSIVE HEARING TEST: CPT | Performed by: AUDIOLOGIST

## 2024-09-10 PROCEDURE — 92550 TYMPANOMETRY & REFLEX THRESH: CPT | Performed by: AUDIOLOGIST

## 2024-09-10 NOTE — TELEPHONE ENCOUNTER
Appt canceled since we don't have Covid shot yet. Pt declined flu only appt as he wants FLu and Covid together. Pt will call later

## 2024-09-10 NOTE — PROGRESS NOTES
AUDIOLOGY REPORT    SUBJECTIVE: Wiley Storey is a 73 year old male who was seen at the [facility] on 9/10/24 for audiologic evaluation, referred by, Stew Saravia MD. The patient has not been seen previously in this clinic. The patient reports reports known bilateral hearing loss and bilateral tinnitus. He reports a history of 3 episodes of vertigo which resolved after performing the Epley maneuver. He has a history of noise exposure without hearing protection. He denies otalgia, otorrhea, aural fullness, and ear surgery.        OBJECTIVE:  Abuse Screening:  Do you feel unsafe at home or work/school? No  Do you feel threatened by someone? No  Does anyone try to keep you from having contact with others, or doing things outside of your home? No  Physical signs of abuse present? No     Fall Risk Screen:  1. Have you fallen two or more times in the past year? No  2. Have you fallen and had an injury in the past year? No    Timed Up and Go Score (in seconds): Not tested  Is patient a fall risk?   Referral initiated: No  Fall Risk Assessment Completed by Audiology      Otoscopic exam indicates non-occluding cerumen bilaterally     Pure Tone Thresholds assessed using conventional audiometry with good reliability from 250-8000 Hz bilaterally using insert earphones and circumaural headphones.     RIGHT:  Normal sloping to moderately-severe sensorineural hearing loss     LEFT:    Normal sloping to moderately-severe sensorineural hearing loss     Tympanogram:    RIGHT: normal eardrum mobility    LEFT:   hypermobile eardrum mobility    Reflexes (reported by stimulus ear): 1000 Hz  RIGHT: Ipsilateral is present at normal levels  RIGHT: Contralateral is present at normal levels  LEFT:   Ipsilateral is present at normal levels  LEFT:   Contralateral is present at normal levels    Speech Reception Threshold:    RIGHT: 10 dB HL    LEFT:   15 dB HL    Word Recognition Score:     RIGHT:  100% at 60 dB HL using NU-6 recorded word list.    LEFT:   100% at 60 dB HL using NU-6 recorded word list.      ASSESSMENT: Today's results reveal high-frequency sensorineural hearing loss bilaterally. Today s results were discussed with the patient in detail.       PLAN: Wiley is a good hearing aid candidate at this time. The patient was counseled regarding hearing loss and impact on communication. The patient is encouraged to contact their insurance to determine hearing aid benefits and inquire if those benefits may be used at [facility]. He is welcome to call and schedule a hearing aid consultation at this clinic if desired. It is recommended that Wiley return for repeat audiologic evaluation if new concerns arise. The patient expressed agreement and understanding of the plan. Please call this clinic with questions regarding these results or recommendations.     The doctoral extern, Jyotsna Mae, observed today's appointment.       Sonja Wiley, CCC-A  Clinical Audiologist  [place] #[id number]

## 2024-10-09 ENCOUNTER — IMMUNIZATION (OUTPATIENT)
Dept: FAMILY MEDICINE | Facility: CLINIC | Age: 74
End: 2024-10-09
Payer: COMMERCIAL

## 2024-10-09 PROCEDURE — 91320 SARSCV2 VAC 30MCG TRS-SUC IM: CPT

## 2024-10-09 PROCEDURE — 90662 IIV NO PRSV INCREASED AG IM: CPT

## 2024-10-09 PROCEDURE — G0008 ADMIN INFLUENZA VIRUS VAC: HCPCS

## 2024-10-09 PROCEDURE — 90480 ADMN SARSCOV2 VAC 1/ONLY CMP: CPT

## 2025-01-13 ENCOUNTER — TELEPHONE (OUTPATIENT)
Dept: SLEEP MEDICINE | Facility: CLINIC | Age: 75
End: 2025-01-13
Payer: COMMERCIAL

## 2025-01-13 NOTE — TELEPHONE ENCOUNTER
Order/Referral Request    Who is requesting: Wiley     Orders being requested: Sleep Study     Reason service is needed/diagnosis: sleep    When are orders needed by: ASAP we do not have order for the sleep study     Has this been discussed with Provider: Yes    Does patient have a preference on a Group/Provider/Facility? [facility]    Does patient have an appointment scheduled?: No    Where to send orders: Place orders within Epic    Could we send this information to you in [facility] or would you prefer to receive a phone call?:   Patient would prefer a phone call   Okay to leave a detailed message?: Yes at Cell number on file:    Telephone Information:   Mobile [phone]

## 2025-01-14 NOTE — TELEPHONE ENCOUNTER
Patient received new supplies from us right at the turn of the year. We are able to use the study from 2012. Thanks!

## 2025-01-15 NOTE — TELEPHONE ENCOUNTER
Spoke with patient. Advised per provider and DME study is not needed. Transferred to central scheduling to schedule CPAP follow up appointment.     Amanad WEAVER RN  [facility]

## 2025-01-16 ENCOUNTER — OFFICE VISIT (OUTPATIENT)
Dept: FAMILY MEDICINE | Facility: CLINIC | Age: 75
End: 2025-01-16
Payer: COMMERCIAL

## 2025-01-16 VITALS
DIASTOLIC BLOOD PRESSURE: 83 MMHG | SYSTOLIC BLOOD PRESSURE: 161 MMHG | HEART RATE: 66 BPM | TEMPERATURE: 97.5 F | OXYGEN SATURATION: 99 % | HEIGHT: 67 IN | BODY MASS INDEX: 24.01 KG/M2 | RESPIRATION RATE: 14 BRPM | WEIGHT: 153 LBS

## 2025-01-16 DIAGNOSIS — I10 HYPERTENSION GOAL BP (BLOOD PRESSURE) < 140/90: ICD-10-CM

## 2025-01-16 DIAGNOSIS — Z95.2 S/P TAVR (TRANSCATHETER AORTIC VALVE REPLACEMENT): ICD-10-CM

## 2025-01-16 DIAGNOSIS — I50.32 CHRONIC DIASTOLIC (CONGESTIVE) HEART FAILURE (H): ICD-10-CM

## 2025-01-16 DIAGNOSIS — I10 BENIGN ESSENTIAL HYPERTENSION: Primary | ICD-10-CM

## 2025-01-16 DIAGNOSIS — N18.2 STAGE 2 CHRONIC KIDNEY DISEASE: ICD-10-CM

## 2025-01-16 PROCEDURE — G2211 COMPLEX E/M VISIT ADD ON: HCPCS | Performed by: FAMILY MEDICINE

## 2025-01-16 PROCEDURE — 99215 OFFICE O/P EST HI 40 MIN: CPT | Performed by: FAMILY MEDICINE

## 2025-01-16 RX ORDER — LISINOPRIL 10 MG/1
10 TABLET ORAL DAILY
Qty: 90 TABLET | Refills: 2 | Status: SHIPPED | OUTPATIENT
Start: 2025-01-16

## 2025-01-16 NOTE — PROGRESS NOTES
OFFICE VISIT    Assessment/Plan:     Patient Instructions:    -Start the lisinopril as prescribed.  -Continue to eat healthy and following a low-salt diet.  -Continue to find ways to stay active.  -Find ways to help calm yourself, such as breathing techniques.  There are a lot of free applications that you can get on your phone that can help with reduce anxiety.  Try and see if these will be helpful for you.  -See your heart doctor as planned at [facility].  -Dr. Saravia would recommend rechecking the potassium in the next 2-4 weeks.  Double check to see that your heart team checks the magnesium as well.    Please seek immediate medical attention (go to the emergency room or urgent care) for the following reasons: worsening symptoms, or any concerning changes.      Wiley was seen today for hypertension.  Diagnoses and all orders for this visit:    Benign essential hypertension  Hypertension goal BP (blood pressure) < 140/90  Stage 2 chronic kidney disease  Chronic diastolic (congestive) heart failure (H)  S/P TAVR (transcatheter aortic valve replacement)  -     lisinopril (ZESTRIL) 10 MG tablet; Take 1 tablet (10 mg) by mouth daily.        Return in about 3 months (around 4/16/2025).    The diagnoses, treatment options, risk, benefits, and recommendations were reviewed with patient/guardian.  Questions were answered to patient's/guardian satisfaction.  Red flag signs were reviewed.  Patient/guardian is in agreement with above plan.      Subjective: 74 year old male with history of CABG x4v (2012 at [facility]), bicuspid aortic valve with stenosis (nonrheumatic) s/p TAVR (02/01/2024), chronic diastolic congestive heart failure, atrial fibrillation (post CABG; resolved after cardioversion; not on anticoagulation), hyperlipidemia, hypertension, central sleep apnea on EPAP, hypothyroidism, migraine with aura, CKD stage II, history of tobacco abuse in remission who presents to clinic for the following complaints:   Patient  presents with:  Hypertension    Answers submitted by the patient for this visit:  Hypertension Visit (Submitted on 1/16/2025)  Chief Complaint: Chronic problems general questions HPI Form  Do you check your blood pressure regularly outside of the clinic?: Yes  Are your blood pressures ever more than 140 on the top number (systolic) OR more than 90 on the bottom number (diastolic)? (For example, greater than 140/90): Yes  Are you following a low salt diet?: Yes  General Questionnaire (Submitted on 1/16/2025)  Chief Complaint: Chronic problems general questions HPI Form  How many servings of fruits and vegetables do you eat daily?: 4 or more  How many minutes a day do you exercise enough to make your heart beat faster?: 60 or more  How many days per week do you miss taking your medication?: 0  Questionnaire about: Chronic problems general questions HPI Form (Submitted on 1/16/2025)  Chief Complaint: Chronic problems general questions HPI Form      Patient returns to clinic for reevaluation of elevated blood pressure.  Patient has noted that her blood pressures have been elevated recently.  He is currently not on any blood pressure medications.      Reviewed blood pressure goals and recommendations.  Discussed treatment options.  We had discussed options at the previous visit in 09/03/2024, though patient elected to work on exercise and a healthy over the next several months.  Unfortunately, blood pressures have remained elevated.    Seeing the heart team at [facility] in a few weeks.     Was in HI and BP went    Home HR in the high 40's.     He takes some potassium with cramps in the legs. He has been increasing the amount of bananas and oranges, and avacado.        5/20/2024  1:38 PM 5/20/2024  1:51 PM 5/21/2024  1:23 PM 9/3/2024  2:06 PM 9/3/2024  2:20 PM 9/3/2024  2:48 PM 1/16/2025  2:56 PM   Vital Signs          Systolic 142 !  149 !  130  164 !  164 !  162 !  154 !    Diastolic 87 !  96 (H)  70  92 (H)  82 !  80 !  88  "!    Pulse 52    62    66       1/16/2025  3:07 PM   Vital Signs    Systolic 161 !    Diastolic 83 !    Pulse       Legend:  ! Abnormal  (H) High     Latest Reference Range & Units 05/20/24 14:30   Sodium 135 - 145 mmol/L 140   Potassium 3.4 - 5.3 mmol/L 4.2   Chloride 98 - 107 mmol/L 107   Carbon Dioxide (CO2) 22 - 29 mmol/L 23   Urea Nitrogen 8.0 - 23.0 mg/dL 16.8   Creatinine 0.67 - 1.17 mg/dL 1.10   GFR Estimate >60 mL/min/1.73m2 70   Calcium 8.8 - 10.2 mg/dL 8.9   Anion Gap 7 - 15 mmol/L 10   Glucose 70 - 99 mg/dL 85       Estimated Creatinine Clearance: 56.1 mL/min (based on SCr of 1.1 mg/dL).      The 10 point review of system is negative except as stated in the HPI.    Allergies were reviewed and updated.    Objective:   BP (!) 161/83   Pulse 66   Temp 97.5  F (36.4  C) (Oral)   Resp 14   Ht 1.702 m (5' 7.01\")   Wt 69.4 kg (153 lb)   SpO2 99%   BMI 23.96 kg/m    General: Active, alert, nontoxic-appearing.  No acute distress.  HEENT: Normocephalic, atraumatic.  Pupils are equal and round.  Sclera is clear.  Normal external ears. Nares patent.  Moist mucous membranes.    Cardiac: RRR.  S1, S2 present.  No murmurs, rubs, or gallops.  Respiratory/chest: Clear to auscultation bilaterally.  No wheezes, rales, rhonchi.  Breathing is not labored.  No accessory muscle usage.  Abdomen: Soft, nondistended, nontender.  No masses or organomegaly noted.  No guarding or rebound tenderness appreciated.  Extremities: Voluntary movements intact.  Integumentary: No concerning rash or skin changes appreciated.        ***          Current Outpatient Medications   Medication Sig Dispense Refill    amoxicillin (AMOXIL) 500 MG capsule Take 4 caps (2000 mg) 1 hour prior to dental procedure.      Blood Pressure Monitoring (B-D ASSURE BPM/AUTO ARM CUFF) MISC 1 Device daily 1 Device 0    Cholecalciferol (VITAMIN D PO) Take 3,000 Units by mouth daily 1 tab daily      CRANBERRY EXTRACT PO Take 2 tablets by mouth daily      " ezetimibe (ZETIA) 10 MG tablet Take 1 tablet (10 mg) by mouth daily 90 tablet 1    lisinopril (ZESTRIL) 10 MG tablet Take 1 tablet (10 mg) by mouth daily. 90 tablet 2    Multiple Vitamins-Minerals (MULTIVITAL PO) Take by mouth daily 1 TABLET DAILY      Probiotic Product (PROBIOTIC GUMMIES PO) Take 1 tablet by mouth 2 times daily      saw palmetto 450 MG CAPS capsule Take 900 mg by mouth daily      vitamin B complex with vitamin C (VITAMIN  B COMPLEX) tablet Take 1 tablet by mouth daily      vitamin B-12 (CYANOCOBALAMIN) 1000 MCG tablet Take 1,000 mcg by mouth three times a week      vitamin C (ASCORBIC ACID) 1000 MG TABS Take 1,000 mg by mouth daily.       No current facility-administered medications for this visit.       Allergies   Allergen Reactions    Aspirin      Early 1990s, took one dose of aspirin + ibuprofen and had severe GI bleed - hospitalization required    Ibuprofen      Early 1990s, took one dose of aspirin + ibuprofen and had severe GI bleed -hospitalization required       Patient Active Problem List    Diagnosis Date Noted    Methylenetetrahydrofolate reductase (MTHFR) deficiency 09/03/2024     Priority: Medium    Anemia, unspecified type 05/22/2024     Priority: Medium    Lesion of pancreas 05/01/2024     Priority: Medium     Likely a cyst. Recommend repeat MRI in 2 years (05/2026). See imaging below.    Narrative & Impression  EXAM: MR RENAL W/O and W CONTRAST  LOCATION: [facility]  DATE: 5/3/2024     INDICATION:  Renal lesion, Lesion of pancreas  COMPARISON: CTA 4/15/2024 and 4/8/2024.  TECHNIQUE: Routine MRI renal protocol including T1 in/out phase, diffusion, multiplane T2, and dynamic T1 with IV contrast.  CONTRAST: Also CT 1/3/2024.     FINDINGS:     RIGHT KIDNEY: A few small scattered benign cysts. Largest is in the central kidney measures 2.0 cm. No follow-up needed for these.     LEFT KIDNEY: Few scattered benign cysts including a 1.7 cm hemorrhagic cyst mid  kidney laterally. No enhancement. No concerning features. Kidney otherwise normal.     PANCREAS: Tiny 5 mm cyst in the inferior aspect of the head of the pancreas corresponds to previous CT findings. Most likely benign and of little significance although a side branch IPMN is in the differential.     ADDITIONAL FINDINGS: No significant findings in the liver, pancreas                                                                      IMPRESSION:     1. Benign renal cysts including a 1.7 cm hemorrhagic cyst left kidney. No follow-up needed for these.  2. Tiny 5 mm cyst inferior head of the pancreas stable since previous CTs. Most likely of little significance although a side branch IPMN is in the differential. Recommend follow-up MRI in 2 years given previously noted stability.      S/P TAVR (transcatheter aortic valve replacement) 04/15/2024     Priority: Medium     Lifelong bacterial endocarditis prophylaxis.      Gastrointestinal hemorrhage with melena 04/15/2024     Priority: Medium    Non-melanoma skin cancer 02/05/2024     Priority: Medium    History of GI bleed 02/05/2024     Priority: Medium     In 1990's after taking aspirin/ibuprofen.      Thrombocytopenia 02/05/2024     Priority: Medium    History of transcatheter aortic valve implantation (LASHAE) 02/05/2024     Priority: Medium    Chronic diastolic (congestive) heart failure (H) 02/01/2024     Priority: Medium    Stage 2 chronic kidney disease 02/01/2024     Priority: Medium    Bicuspid aortic valve 05/16/2023     Priority: Medium    History of coronary artery bypass surgery 12/05/2022     Priority: Medium     Last Assessment & Plan:    Formatting of this note might be different from the original.   Fortunately, he is not had symptoms of exertional angina. The patient is aware of the need for Secondary prevention through aggressive CV risk factor modifications to include: blood pressure control, LDL control and daily exercise (per guidelines), etc.       Tobacco abuse, in remission 12/05/2022     Priority: Medium     Last Assessment & Plan:    Formatting of this note might be different from the original.   Fortunately, he is not smoking at this time.      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   Tylenol if needed.      History of atrial fibrillation 07/11/2019     Priority: Medium     Formatting of this note might be different from the original.   3/12:  s/p MAZE during CABG     7/12:  s/p cardioversion  Formatting of this note might be different from the original.   3/12:  s/p MAZE during CABG     7/12:  s/p cardioversion      Rosacea 07/11/2019     Priority: Medium    Tinnitus 07/11/2019     Priority: Medium     Formatting of this note might be different from the original.   2/18:  Audiogram:  Bilateral moderate high frequency sensorineural hearing loss from 8467-3723 Hz with normal hearing 250-2000 Hz. Speech discrimination ability in quiet is excellent at normal conversational levels. Not a candidate for amplification  Formatting of this note might be different from the original.   2/18:  Audiogram:  Bilateral moderate high frequency sensorineural hearing loss from 4373-2700 Hz with normal hearing 250-2000 Hz. Speech discrimination ability in quiet is excellent at normal conversational levels. Not a candidate for amplification  Formatting of this note might be different from the original.   2/18:  Audiogram:  Bilateral moderate high  frequency sensorineural hearing loss from 1966-8832 Hz with normal hearing 250-2000 Hz. Speech discrimination ability in quiet is excellent at normal conversational levels. Not a candidate for amplification  Formatting of this note might be different from the original.   Formatting of this note might be different from the original.    2/18:  Audiogram:  Bilateral moderate high frequency sensorineural hearing loss from 6616-9786 Hz with normal hearing 250-2000 Hz. Speech discrimination ability in quiet is excellent at normal conversational levels. Not a candidate for amplification   Formatting of this note might be different from the original.    2/18:  Audiogram:  Bilateral moderate high frequency sensorineural hearing loss from 1454-4726 Hz with normal hearing 250-2000 Hz. Speech discrimination ability in quiet is excellent at normal conversational levels. Not a candidate for amplification   Formatting of this note might be different from the original.    2/18:  Audiogram:  Bilateral moderate high frequency sensorineural hearing loss from 9682-8495 Hz with normal hearing 250-2000 Hz. Speech discrimination ability in quiet is excellent at normal conversational levels. Not a candidate for amplification      Recurrent UTI 08/01/2018     Priority: Medium     Formatting of this note might be different from the original.   11/17:     12/17:  Triphasic CT Kidneys:  No stones, microlobulated contour of post-inf bladder wall, likely trabeculations; correlate w/ cysto   1/18:  Cysto:  normal  Formatting of this note might be different from the original.   11/17:     12/17:  Triphasic CT Kidneys:  No stones, microlobulated contour of post-inf bladder wall, likely trabeculations; correlate w/ cysto   1/18:  Cysto:  normal      Primary central sleep apnea 02/07/2017     Priority: Medium     Formatting of this note might be different from the original.   5/31/17:  Sleep study titration:  ASV auto w/ EPAP min 5  max  15; PS min 0  max 15; resp events incl central apneas were controlled at these settings  Formatting of this note might be different from the original.   5/31/17:  Sleep study titration:  ASV auto w/ EPAP min 5  max 15; PS min 0  max 15; resp events incl central apneas were controlled at these settings  Formatting of this note might be different from the original.   Formatting of this note might be different from the original.    5/31/17:  Sleep study titration:  ASV auto w/ EPAP min 5  max 15; PS min 0  max 15; resp events incl central apneas were controlled at these settings   Formatting of this note might be different from the original.    5/31/17:  Sleep study titration:  ASV auto w/ EPAP min 5  max 15; PS min 0  max 15; resp events incl central apneas were controlled at these settings      NSVT (nonsustained ventricular tachycardia) (H)      Priority: Medium    Hyperlipidemia LDL goal <70 03/07/2012     Priority: Medium    Intermediate coronary syndrome (H) 03/07/2012     Priority: Medium    Abnormal stress electrocardiogram test 03/06/2012     Priority: Medium    ASCVD (arteriosclerotic cardiovascular disease) 03/06/2012     Priority: Medium    Chest discomfort 02/09/2012     Priority: Medium    Hyperthyroidism 02/08/2012     Priority: Medium     Was taking supplements from herbalist with iodine in them -   Treated with methimazole  Avoid IV contrast - iodine or supplements      Hypertension goal BP (blood pressure) < 140/90 02/07/2012     Priority: Medium     On lisinopril -   Started in 2003        ADELIA (obstructive sleep apnea) 02/07/2012     Priority: Medium     Sleeps with CPAP -        Atrial fibrillation (H) 02/07/2012     Priority: Medium     New onset 2012 -   Getting ECHO to look at aortic vavle (hx of sclerosis)  On coumadin (hx of ASCVD s/p bypass) from 2012 - 9/2015 (stopped by cardiology)    S/p maze procedure      Aortic sclerosis 10/11/2011     Priority: Medium    Esophageal reflux 10/11/2011      Priority: Medium    Headache 10/11/2011     Priority: Medium     Problem list name updated by automated process. Provider to review      Thyroid nodule 10/11/2011     Priority: Medium     Needs repeat thyroid ultrasound in 2014      Lumbago 11/05/2004     Priority: Medium    Peptic ulcer without hemorrhage or perforation 11/05/2004     Priority: Medium     Formatting of this note might be different from the original.   nSAID-INDUCED         Family History   Problem Relation Age of Onset    Cardiovascular Unknown         aortic aneurysm, CAD    Cancer Mother 86        pancreatic    C.A.D. Mother         passed away at 86    C.A.D. Brother         stent    Diabetes Maternal Grandmother     Hypertension Brother        Past Surgical History:   Procedure Laterality Date    ANESTHESIA CARDIOVERSION  07/02/2012    Procedure: ANESTHESIA CARDIOVERSION;  Anesthesia Off site Cardioversion;  Surgeon: Provider, Generic Anesthesia;  [facility]: [facility] OR    BYPASS GRAFT ARTERY CORONARY  03/08/2012    Procedure:BYPASS GRAFT ARTERY CORONARY; Median Sternotomy, Coronary Artery Bypass Graft X4 Using the Left Internal Mamary and Left Saphenous Vein with MAZE Procedure, and On Pump Oxygenator.; Surgeon:MICHOACANO HOANG; Location:[facility] OR    COLONOSCOPY N/A 08/16/2016    Procedure: COLONOSCOPY;  Surgeon: Brad Peralta MD;  Location:  GI    COLONOSCOPY N/A 11/10/2016    Procedure: COLONOSCOPY;  Surgeon: Brad Peralta MD;  Location: [facility]    ESOPHAGOSCOPY, GASTROSCOPY, DUODENOSCOPY (EGD), COMBINED N/A 04/16/2024    Procedure: ESOPHAGOGASTRODUODENOSCOPY;  Surgeon: Hugo Burnette MD;  Location: [facility] OR    MAZE PROCEDURE  03/08/2012    Procedure:MAZE PROCEDURE; Surgeon:MICHOACANO HOANG; Location: OR    TONSILLECTOMY      ulcer operation  01/01/1991    [facility] TRANSCATHETER AORTIC VALVE REPLACE (TAVR/LASHAE), W/PROSTH VALVE; TRANSCAVAL APPR  02/01/2024    See [place] Admission from 02/01/2024        Social History     Socioeconomic  History    Marital status:      Spouse name: Not on file    Number of children: Not on file    Years of education: Not on file    Highest education level: Not on file   Occupational History    Not on file   Tobacco Use    Smoking status: Former     Current packs/day: 0.00     Types: Cigarettes     Quit date: 1983     Years since quittin.0     Passive exposure: Never    Smokeless tobacco: Never   Vaping Use    Vaping status: Never Used   Substance and Sexual Activity    Alcohol use: Yes     Comment: occ wine on weekends, none since     Drug use: No    Sexual activity: Yes     Partners: Female   Other Topics Concern    Parent/sibling w/ CABG, MI or angioplasty before 65F 55M? No   Social History Narrative    Not on file     Social Drivers of Health     Financial Resource Strain: Low Risk  (9/3/2024)    Financial Resource Strain     Within the past 12 months, have you or your family members you live with been unable to get utilities (heat, electricity) when it was really needed?: No   Food Insecurity: High Risk (9/3/2024)    Food Insecurity     Within the past 12 months, did you worry that your food would run out before you got money to buy more?: No     Within the past 12 months, did the food you bought just not last and you didn t have money to get more?: Yes   Transportation Needs: Low Risk  (9/3/2024)    Transportation Needs     Within the past 12 months, has lack of transportation kept you from medical appointments, getting your medicines, non-medical meetings or appointments, work, or from getting things that you need?: No   Physical Activity: Insufficiently Active (9/3/2024)    Exercise Vital Sign     Days of Exercise per Week: 5 days     Minutes of Exercise per Session: 20 min   Stress: No Stress Concern Present (9/3/2024)    [employer] - Occupational Stress Questionnaire     Feeling of Stress : Not at all   Social Connections: Unknown (9/3/2024)    Social  Connection and Isolation Panel [NHANES]     Frequency of Communication with Friends and Family: Not on file     Frequency of Social Gatherings with Friends and Family: Once a week     Attends Sabianism Services: Not on file     Active Member of Clubs or Organizations: Not on file     Attends Club or Organization Meetings: Not on file     Marital Status: Not on file   Interpersonal Safety: Low Risk  (9/3/2024)    Interpersonal Safety     Do you feel physically and emotionally safe where you currently live?: Yes     Within the past 12 months, have you been hit, slapped, kicked or otherwise physically hurt by someone?: No     Within the past 12 months, have you been humiliated or emotionally abused in other ways by your partner or ex-partner?: No   Housing Stability: Low Risk  (9/3/2024)    Housing Stability     Do you have housing? : Yes     Are you worried about losing your housing?: No            sclerosis)  On coumadin (hx of ASCVD s/p bypass) from 2012 - 9/2015 (stopped by cardiology)    S/p maze procedure      Aortic sclerosis 10/11/2011     Priority: Medium    Esophageal reflux 10/11/2011     Priority: Medium    Headache 10/11/2011     Priority: Medium     Problem list name updated by automated process. Provider to review      Thyroid nodule 10/11/2011     Priority: Medium     Needs repeat thyroid ultrasound in 2014      Lumbago 11/05/2004     Priority: Medium    Peptic ulcer without hemorrhage or perforation 11/05/2004     Priority: Medium     Formatting of this note might be different from the original.   nSAID-INDUCED         Family History   Problem Relation Age of Onset    Cardiovascular Unknown         aortic aneurysm, CAD    Cancer Mother 86        pancreatic    C.A.D. Mother         passed away at 86    C.A.D. Brother         stent    Diabetes Maternal Grandmother     Hypertension Brother        Past Surgical History:   Procedure Laterality Date    ANESTHESIA CARDIOVERSION  07/02/2012    Procedure: ANESTHESIA CARDIOVERSION;  Anesthesia Off site Cardioversion;  Surgeon: Provider, Generic Anesthesia;  Location: [facility] OR    BYPASS GRAFT ARTERY CORONARY  03/08/2012    Procedure:BYPASS GRAFT ARTERY CORONARY; Median Sternotomy, Coronary Artery Bypass Graft X4 Using the Left Internal Mamary and Left Saphenous Vein with MAZE Procedure, and On Pump Oxygenator.; Surgeon:MICHOACANO HOANG; Location:[facility] OR    COLONOSCOPY N/A 08/16/2016    Procedure: COLONOSCOPY;  Surgeon: Brad Peralta MD;  Location:  GI    COLONOSCOPY N/A 11/10/2016    Procedure: COLONOSCOPY;  Surgeon: Brad Peralta MD;  Location:  GI    ESOPHAGOSCOPY, GASTROSCOPY, DUODENOSCOPY (EGD), COMBINED N/A 04/16/2024    Procedure: ESOPHAGOGASTRODUODENOSCOPY;  Surgeon: Hugo Burnette MD;  Location: [facility] OR    MAZE PROCEDURE  03/08/2012    Procedure:MAZE PROCEDURE; Surgeon:MICHOACANO HOANG; Location:[facility] OR    TONSILLECTOMY      ulcer  operation  1991    [facility] TRANSCATHETER AORTIC VALVE REPLACE (TAVR/LASHAE), W/PROSTH VALVE; TRANSCAVAL APPR  2024    See [place] Admission from 2024        Social History     Socioeconomic History    Marital status:      Spouse name: Not on file    Number of children: Not on file    Years of education: Not on file    Highest education level: Not on file   Occupational History    Not on file   Tobacco Use    Smoking status: Former     Current packs/day: 0.00     Types: Cigarettes     Quit date: 1983     Years since quittin.0     Passive exposure: Never    Smokeless tobacco: Never   Vaping Use    Vaping status: Never Used   Substance and Sexual Activity    Alcohol use: Yes     Comment: occ wine on weekends, none since     Drug use: No    Sexual activity: Yes     Partners: Female   Other Topics Concern    Parent/sibling w/ CABG, MI or angioplasty before 65F 55M? No   Social History Narrative    Not on file     Social Drivers of Health     Financial Resource Strain: Low Risk  (9/3/2024)    Financial Resource Strain     Within the past 12 months, have you or your family members you live with been unable to get utilities (heat, electricity) when it was really needed?: No   Food Insecurity: High Risk (9/3/2024)    Food Insecurity     Within the past 12 months, did you worry that your food would run out before you got money to buy more?: No     Within the past 12 months, did the food you bought just not last and you didn t have money to get more?: Yes   Transportation Needs: Low Risk  (9/3/2024)    Transportation Needs     Within the past 12 months, has lack of transportation kept you from medical appointments, getting your medicines, non-medical meetings or appointments, work, or from getting things that you need?: No   Physical Activity: Insufficiently Active (9/3/2024)    Exercise Vital Sign     Days of Exercise per Week: 5 days     Minutes of Exercise per Session: 20 min   Stress: No  Stress Concern Present (9/3/2024)    [employer] - Occupational Stress Questionnaire     Feeling of Stress : Not at all   Social Connections: Unknown (9/3/2024)    Social Connection and Isolation Panel [NHANES]     Frequency of Communication with Friends and Family: Not on file     Frequency of Social Gatherings with Friends and Family: Once a week     Attends Sabianist Services: Not on file     Active Member of Clubs or Organizations: Not on file     Attends Club or Organization Meetings: Not on file     Marital Status: Not on file   Interpersonal Safety: Low Risk  (9/3/2024)    Interpersonal Safety     Do you feel physically and emotionally safe where you currently live?: Yes     Within the past 12 months, have you been hit, slapped, kicked or otherwise physically hurt by someone?: No     Within the past 12 months, have you been humiliated or emotionally abused in other ways by your partner or ex-partner?: No   Housing Stability: Low Risk  (9/3/2024)    Housing Stability     Do you have housing? : Yes     Are you worried about losing your housing?: No

## 2025-01-16 NOTE — PATIENT INSTRUCTIONS
-Thank you for choosing the [facility].  -It was a pleasure to see you today.  -Please take a look at the information below for more specific details regarding the treatment plan and recommendations.  -In this after visit summary is a list of your medications and specific instructions.  Please review this carefully as there may be changes made to your medication list.  -If there are any particular questions or concerns, please feel free to reach out to Dr. Saravia.  -If any labs have been completed, we will reach out to you about results.  If the results are normal or not concerning, a letter or [facility] message will be sent to you.  If any follow-up is needed, either Dr. Saravia or the nurse will give you a call.  If you have not heard regarding results after 2 weeks, please reach out to the clinic.    Patient Instructions:    -Start the lisinopril as prescribed.  -Continue to eat healthy and following a low-salt diet.  -Continue to find ways to stay active.  -Find ways to help calm yourself, such as breathing techniques.  There are a lot of free applications that you can get on your phone that can help with reduce anxiety.  Try and see if these will be helpful for you.  -See your heart doctor as planned at [facility].  -Dr. Saravia would recommend rechecking the potassium in the next 2-4 weeks.  Double check to see that your heart team checks the magnesium as well.    Please seek immediate medical attention (go to the emergency room or urgent care) for the following reasons: worsening symptoms, or any concerning changes.      --------------------------------------------------------------------------------------------------------------------    -We are always looking for ways to improve.  You may be selected to receive a survey regarding your visit today.  We encourage you to complete the survey and provide specific, constructive feedback to help us improve our processes.  Thank you for your  time!  -Please review the contact information listed on the after visit summary and in the electronic chart.  Below is the phone number that we have on file.  If there are any changes that are needed to be made, please reach out to the clinic.  [phone] (home)

## 2025-01-30 ENCOUNTER — TELEPHONE (OUTPATIENT)
Dept: HOME HEALTH SERVICES | Facility: CLINIC | Age: 75
End: 2025-01-30
Payer: COMMERCIAL

## 2025-01-30 DIAGNOSIS — G47.31 CENTRAL SLEEP APNEA: Primary | ICD-10-CM

## 2025-01-30 NOTE — TELEPHONE ENCOUNTER
Called and spoke with pt about his ASV machine, pt previously lived in [place] and received a machine while living there from another [employer] company. Pt recalls it was either 2016 or 2017. Pt is having problems with the machine now with ramping very slowling the pressures then the pressures get very strong without a reason. He said it also has an error message about motor life exceeded. Let pt know I will reach out to Bennett Goltz and ask him to write an order for a replacement machine. Will also need addended notes.   Keep pt updated about getting a replacement machine.     Paris Alejandro, RRT  [facility]  [phone]

## 2025-01-30 NOTE — Clinical Note
Michele Chavez, pt is having issues with his machine and is needing a replacement ASV. (Settings found in previous office visit note. Would you also be able to write a note that pt is needing a replacement device since his is exceeding motor life. Thanks, Paris DICKINSON

## 2025-02-05 ENCOUNTER — DOCUMENTATION ONLY (OUTPATIENT)
Dept: HOME HEALTH SERVICES | Facility: CLINIC | Age: 75
End: 2025-02-05

## 2025-02-05 ENCOUNTER — APPOINTMENT (OUTPATIENT)
Dept: SLEEP MEDICINE | Facility: CLINIC | Age: 75
End: 2025-02-05
Payer: COMMERCIAL

## 2025-02-05 DIAGNOSIS — G47.31 CENTRAL SLEEP APNEA: Primary | ICD-10-CM

## 2025-02-05 NOTE — PROGRESS NOTES
Patient was offered choice of vendor and chose [facility].  Patient Wiley Storey was set up at [place] on February 5, 2025. Patient received a [employer] Other aircurve 11 ASV  Pressures were set at  epap 5-15, PS 0-15 .   Patient s ramp is 0 cm H2O for Off and FLEX/EPR is .  Patient received a [employer] Mask name: Vitera  Full Face mask size Medium, heated tubing and heated humidifier.  Patient has the following compliance requirements: using and visit requirements  Patient has a follow up on 8/5/25 with Bennett Goltz, PA-C.    Paris Alejandro, RT

## 2025-02-25 ENCOUNTER — OFFICE VISIT (OUTPATIENT)
Dept: FAMILY MEDICINE | Facility: CLINIC | Age: 75
End: 2025-02-25
Payer: COMMERCIAL

## 2025-02-25 VITALS
TEMPERATURE: 97.6 F | WEIGHT: 154 LBS | HEIGHT: 67 IN | OXYGEN SATURATION: 99 % | BODY MASS INDEX: 24.17 KG/M2 | SYSTOLIC BLOOD PRESSURE: 114 MMHG | RESPIRATION RATE: 18 BRPM | HEART RATE: 53 BPM | DIASTOLIC BLOOD PRESSURE: 80 MMHG

## 2025-02-25 DIAGNOSIS — K92.1 MELENA: Primary | ICD-10-CM

## 2025-02-25 DIAGNOSIS — K26.9 DUODENAL ULCER: ICD-10-CM

## 2025-02-25 DIAGNOSIS — K92.1 BLOOD IN STOOL: ICD-10-CM

## 2025-02-25 DIAGNOSIS — E78.5 HYPERLIPIDEMIA LDL GOAL <70: ICD-10-CM

## 2025-02-25 DIAGNOSIS — K57.30 PANCOLONIC DIVERTICULOSIS: ICD-10-CM

## 2025-02-25 DIAGNOSIS — I10 HYPERTENSION GOAL BP (BLOOD PRESSURE) < 140/90: ICD-10-CM

## 2025-02-25 DIAGNOSIS — Z87.19 HISTORY OF GI BLEED: ICD-10-CM

## 2025-02-25 LAB
ALBUMIN SERPL BCG-MCNC: 4.7 G/DL (ref 3.5–5.2)
ALP SERPL-CCNC: 64 U/L (ref 40–150)
ALT SERPL W P-5'-P-CCNC: 20 U/L (ref 0–70)
ANION GAP SERPL CALCULATED.3IONS-SCNC: 10 MMOL/L (ref 7–15)
AST SERPL W P-5'-P-CCNC: 29 U/L (ref 0–45)
BASOPHILS # BLD AUTO: 0 10E3/UL (ref 0–0.2)
BASOPHILS NFR BLD AUTO: 1 %
BILIRUB DIRECT SERPL-MCNC: 0.21 MG/DL (ref 0–0.3)
BILIRUB SERPL-MCNC: 0.6 MG/DL
BUN SERPL-MCNC: 26.5 MG/DL (ref 8–23)
CALCIUM SERPL-MCNC: 9.3 MG/DL (ref 8.8–10.4)
CHLORIDE SERPL-SCNC: 103 MMOL/L (ref 98–107)
CHOLEST SERPL-MCNC: 190 MG/DL
CREAT SERPL-MCNC: 1.13 MG/DL (ref 0.67–1.17)
EGFRCR SERPLBLD CKD-EPI 2021: 67 ML/MIN/1.73M2
EOSINOPHIL # BLD AUTO: 0.1 10E3/UL (ref 0–0.7)
EOSINOPHIL NFR BLD AUTO: 3 %
ERYTHROCYTE [DISTWIDTH] IN BLOOD BY AUTOMATED COUNT: 16.2 % (ref 10–15)
FASTING STATUS PATIENT QL REPORTED: ABNORMAL
FASTING STATUS PATIENT QL REPORTED: ABNORMAL
GLUCOSE SERPL-MCNC: 95 MG/DL (ref 70–99)
HCO3 SERPL-SCNC: 24 MMOL/L (ref 22–29)
HCT VFR BLD AUTO: 44.9 % (ref 40–53)
HDLC SERPL-MCNC: 44 MG/DL
HGB BLD-MCNC: 15.5 G/DL (ref 13.3–17.7)
IMM GRANULOCYTES # BLD: 0 10E3/UL
IMM GRANULOCYTES NFR BLD: 0 %
LDLC SERPL CALC-MCNC: 127 MG/DL
LYMPHOCYTES # BLD AUTO: 0.9 10E3/UL (ref 0.8–5.3)
LYMPHOCYTES NFR BLD AUTO: 18 %
MCH RBC QN AUTO: 31.3 PG (ref 26.5–33)
MCHC RBC AUTO-ENTMCNC: 34.5 G/DL (ref 31.5–36.5)
MCV RBC AUTO: 91 FL (ref 78–100)
MONOCYTES # BLD AUTO: 0.5 10E3/UL (ref 0–1.3)
MONOCYTES NFR BLD AUTO: 11 %
NEUTROPHILS # BLD AUTO: 3.2 10E3/UL (ref 1.6–8.3)
NEUTROPHILS NFR BLD AUTO: 67 %
NONHDLC SERPL-MCNC: 146 MG/DL
PLATELET # BLD AUTO: 132 10E3/UL (ref 150–450)
POTASSIUM SERPL-SCNC: 4.4 MMOL/L (ref 3.4–5.3)
PROT SERPL-MCNC: 7 G/DL (ref 6.4–8.3)
RBC # BLD AUTO: 4.96 10E6/UL (ref 4.4–5.9)
SODIUM SERPL-SCNC: 137 MMOL/L (ref 135–145)
TRIGL SERPL-MCNC: 96 MG/DL
WBC # BLD AUTO: 4.8 10E3/UL (ref 4–11)

## 2025-02-25 PROCEDURE — 36415 COLL VENOUS BLD VENIPUNCTURE: CPT | Performed by: FAMILY MEDICINE

## 2025-02-25 PROCEDURE — 85025 COMPLETE CBC W/AUTO DIFF WBC: CPT | Performed by: FAMILY MEDICINE

## 2025-02-25 PROCEDURE — 80061 LIPID PANEL: CPT | Performed by: FAMILY MEDICINE

## 2025-02-25 PROCEDURE — 82248 BILIRUBIN DIRECT: CPT | Performed by: FAMILY MEDICINE

## 2025-02-25 PROCEDURE — 80053 COMPREHEN METABOLIC PANEL: CPT | Performed by: FAMILY MEDICINE

## 2025-02-25 NOTE — PATIENT INSTRUCTIONS
-Thank you for choosing the [facility].  -It was a pleasure to see you today.  -Please take a look at the information below for more specific details regarding the treatment plan and recommendations.  -In this after visit summary is a list of your medications and specific instructions.  Please review this carefully as there may be changes made to your medication list.  -If there are any particular questions or concerns, please feel free to reach out to Dr. Saravia.  -If any labs have been completed, we will reach out to you about results.  If the results are normal or not concerning, a letter or MyChart message will be sent to you.  If any follow-up is needed, either Dr. Saravia or the nurse will give you a call.  If you have not heard regarding results after 2 weeks, please reach out to the clinic.    Patient Instructions:    -See the  at clinic to help make the appointment for the gastroenterologist at [facility]. Dr. Saravia recommends that you be seen within the next week.   -Continue to hold the aspirin.  -Monitor the blood in stools closely.  If a significant amount of blood is noted or if you feel unwell, please go to the emergency room immediately.    -Continue to follow with the heart team at [facility].  -Based on the testing ordered so far, it would be okay for you to proceed with the testing as scheduled on 2/26/2025.    -Continue the lisinopril as prescribed.  -If blood pressures are consistently in the 90s/low 60s range, you may stop the lisinopril and monitor the blood pressure closely as you have been doing.  -If blood pressure starts to go above 140/90 again on a consistent basis, please restart the lisinopril, though at a lower dose (5 mg or half tablet of the 10 mg lisinopril pill).  -Continue to follow the low-salt diet as you have been doing.  -Try to limit/avoid overexertion when exercising.      Please seek immediate medical attention (go to the emergency room or  urgent care) for the following reasons: worsening symptoms, or any concerning changes.      --------------------------------------------------------------------------------------------------------------------    -We are always looking for ways to improve.  You may be selected to receive a survey regarding your visit today.  We encourage you to complete the survey and provide specific, constructive feedback to help us improve our processes.  Thank you for your time!  -Please review the contact information listed on the after visit summary and in the electronic chart.  Below is the phone number that we have on file.  If there are any changes that are needed to be made, please reach out to the clinic.  [phone] (home)

## 2025-02-25 NOTE — PROGRESS NOTES
OFFICE VISIT    Assessment/Plan:     Patient Instructions:    -See the  at clinic to help make the appointment for the gastroenterologist at [facility]. Dr. Saravia recommends that you be seen within the next week.   -Continue to hold the aspirin.  -Monitor the blood in stools closely.  If a significant amount of blood is noted or if you feel unwell, please go to the emergency room immediately.    -Continue to follow with the heart team at [facility].  -Based on the testing ordered so far, it would be okay for you to proceed with the testing as scheduled on 2/26/2025.    -Continue the lisinopril as prescribed.  -If blood pressures are consistently in the 90s/low 60s range, you may stop the lisinopril and monitor the blood pressure closely as you have been doing.  -If blood pressure starts to go above 140/90 again on a consistent basis, please restart the lisinopril, though at a lower dose (5 mg or half tablet of the 10 mg lisinopril pill).  -Continue to follow the low-salt diet as you have been doing.  -Try to limit/avoid overexertion when exercising.      Please seek immediate medical attention (go to the emergency room or urgent care) for the following reasons: worsening symptoms, or any concerning changes.      Wiley was seen today for rectal bleeding.  Diagnoses and all orders for this visit:    Melena  Blood in stool  History of GI bleed  Duodenal ulcer  Comments:  Nonbleeding on EGD on 04/2024.  Pancolonic diverticulosis  Hypertension goal BP (blood pressure) < 140/90  Hyperlipidemia LDL goal <70: Patient has a recurrence of melena noted for 4 days now.  Last EGD was from 04/2024 and patient had done well since then.  Patient was only on aspirin 81 mg due to the history of atrial fibrillation and TAVR, though patient stopped aspirin 81 mg 4 days ago when the melena was noted.  Exam today is benign other than mild bloating and hyperactive bowel sounds.  Referral placed to gastroenterology requesting  for patient to be seen within the next 3-5 days.  Patient prefers to go to [facility].  Patient brought to scheduling staff today to assist with making the appointment.  Cardiology testing (list given to provided by the patient included EKG, echo, chest x-ray, blood testing) would be appropriate to complete still, unless the hemoglobin is significantly reduced and patient becomes more symptomatic.  Hemoglobin noted to be 15.5 today, which is higher than it was about 4 months ago.  Continue plan as stated.  -     CBC with Platelets & Differential; Future  -     Basic metabolic panel; Future  -     Hepatic function panel; Future  -     Lipid panel reflex to direct LDL Fasting; Future  -     Adult GI  Referral - Consult Only; Future        Return if symptoms worsen or fail to improve.    The diagnoses, treatment options, risk, benefits, and recommendations were reviewed with patient/guardian.  Questions were answered to patient's/guardian satisfaction.  Red flag signs were reviewed.  Patient/guardian is in agreement with above plan.      Subjective: 74 year old male with history of GI bleed (nonbleeding duodenal ulcer noted on 04/2024 EGD), melena, GERD, thrombocytopenia, atrial fibrillation/ s/p TAVR (on ASA 81 mg), hypothyroidism, CABG, CHF (diastolic), who presents to clinic for the following complaints:   Patient presents with:  Rectal Bleeding: Blood in stool x 4 days (black color)    Answers submitted by the patient for this visit:  General Questionnaire (Submitted on 2/25/2025)  Chief Complaint: Chronic problems general questions HPI Form  What is the reason for your visit today? : blood in stool  How many servings of fruits and vegetables do you eat daily?: 4 or more  How many minutes a day do you exercise enough to make your heart beat faster?: 60 or more  How many days a week do you exercise enough to make your heart beat faster?: 6  How many days per week do you miss taking your medication?:  0  Questionnaire about: Chronic problems general questions HPI Form (Submitted on 2/25/2025)  Chief Complaint: Chronic problems general questions HPI Form    Patient started noticing black colored stools four days ago. Then had coffee ground stools the next day. Then it was a bit better, and then today, the black stools came back. First day or two, patient had some pain periodically in the lower abdomen, but nothing since. He typically has one stool per day at baseline.  Denies nausea, vomiting, falls, injuries, bloating, or other changes from baseline.     Currently taking lisinopril 10 mg once daily. Home BP is usually at goal. His BP at home was 114/80 this morning. The lisinopril helped lower the BP when he started the medication.     Had migraines up until after the heart procedure then the migraines resolved. Starting December 2024, he started having headaches. He got a brand new sleep machine for about 2 weeks now. The headaches are starting to go away.     For two weeks, he started taking whey powder protein, one scoop a day for two weeks recently.  When the melena started, patient stopped taking the whey protein powder.  He also stopped taking the baby aspirin 4 days ago.     He was only on omeprazole for a month before when he was found to have the nonbleeding duodenal ulcer.  He was noting side effects on the omeprazole.    After the MRI of the renal with contrast and didn't feel good for two weeks. He does okay with the contrast from the CT scan so far.     Patient reports that he is sensitive to medications and can only take them for so long.     Patient will be seeing the heart specialist at [facility] tomorrow. Has 5 different tests tomorrow with cardiology at [facility] which include blood testing, EKG, echo, CXR scheduled for tomorrow.  He also has an appointment with the cardiology team.      The 10 point review of system is negative except as stated in the HPI.    Allergies were reviewed and  updated.     Latest Reference Range & Units 04/17/24 06:48 04/29/24 16:49 05/20/24 14:30 09/03/24 15:19   WBC 4.0 - 11.0 10e3/uL  4.6 5.8    Hemoglobin 13.3 - 17.7 g/dL 8.7 (L) 10.1 (L) 10.9 (L) 13.3   Hematocrit 40.0 - 53.0 %  30.7 (L) 33.7 (L)    Platelet Count 150 - 450 10e3/uL  167 169    (L): Data is abnormally low      Upper GI Endoscopy 04/16/2024 10:54 AM [facility]  [address] [place] [address]  _______________________________________________________________________________  Patient Name: Wiley Eckart           Procedure Date: 4/16/2024 10:54 AM  MRN: [id number]                       Account Number: [id number]  YOB: 1950              Admit Type: Outpatient  Age: 73                               Room: [place] [address]  Note Status: Finalized                Attending MD: DANIEL MEDINA MD,  Instrument Name: EGD Scope 2066        _______________________________________________________________________________     Procedure:             Upper GI endoscopy  Indications:           Melena  Providers:             DANIEL MEDINA MD  Referring MD:            Medicines:             Propofol per Anesthesia  Complications:         No immediate complications.  _______________________________________________________________________________  Procedure:             Pre-Anesthesia Assessment:                         - Prior to the procedure, a History and Physical was                         performed, and patient medications, allergies and                         sensitivities were reviewed. The patient's tolerance                         of previous anesthesia was reviewed.                         - The risks and benefits of the procedure and the                         sedation options and risks were discussed with the                         patient. All questions were answered and informed                         consent was obtained.                          - Patient identification and proposed procedure were                         verified prior to the procedure by the physician, the                         nurse, the anesthesiologist and the anesthetist. The                         procedure was verified in the pre-procedure area in                         the procedure room.                         After obtaining informed consent, the endoscope was                          passed under direct vision. Throughout the procedure,                         the patient's blood pressure, pulse, and oxygen                         saturations were monitored continuously. The endoscope                         was introduced through the mouth, and advanced to the                         second part of duodenum.                                                                                   Findings:       The examined esophagus was normal.       The entire examined stomach was normal. Biopsies were taken with a cold       forceps for Helicobacter pylori testing. Estimated blood loss: none.       One non-bleeding superficial duodenal ulcer with a clean ulcer base       (Arcadio Class III) was found in the duodenal bulb. The lesion was 5 mm       in largest dimension.                                                                                   Moderate Sedation:       Moderate (conscious) sedation was personally administered by an       anesthesia professional. The following parameters were monitored: oxygen       saturation, heart rate, blood pressure, and response to care.  Impression:            - Normal esophagus.                         - Normal stomach. Biopsied.                         - Non-bleeding duodenal ulcer with a clean ulcer base                         (Arcadio Class III).  Recommendation:        - [facility] patient to [facility] for [facility].                         - OK to eat and restart anti-coagulation. Low risk for                          re-bleed                         - Should remain on BID PPI for 8 weeks, then daily                         thereafter indefinitely                         - I will follow up pathology for h pylori.                         - Likely OK to d/c home tomorrow.                                                                 COLONOSCOPY 11/10/2016 10:28 AM [facility]   [facility]  _______________________________________________________________________________  Patient Name: Wiley Storey        Procedure Date: 11/10/2016 10:28 AM  MRN: [id number]                       Account Number: [id number]  YOB: 1950              Admit Type: Outpatient  Age: 66                               Room: 1                          Note Status: Finalized                Attending MD: Brad Peralta MD  Instrument Name: [id number] AdultColonoscope  _______________________________________________________________________________     Procedure:           Colonoscopy  Indications:         Screening for colorectal malignant neoplasm  Providers:           Brad Peralta MD, Yessica Edwards RN  Patient Profile:     This is a 66 year old male.  Referring MD:        Trisha Donato DO  Medicines:           Midazolam 2 mg IV, Fentanyl 100 micrograms IV  Complications:       No immediate complications.  _______________________________________________________________________________  Procedure:           Pre-Anesthesia Assessment:                       - Prior to the procedure, a History and Physical was                       performed, and patient medications, allergies and                       sensitivities were reviewed. The patient's tolerance of                       previous anesthesia was reviewed.                       - The risks and benefits of the procedure and the                       sedation options and risks were discussed with the                       patient. All  questions were answered and informed                       consent was obtained.                       - Patient identification and proposed procedure were                       verified prior to the procedure by the physician. The                       procedure was verified in the procedure room.                       - Pre-procedure physical examination revealed no                       contraindications to sedation.                       After obtaining informed consent, the colonoscope was                       passed under direct vision. Throughout the procedure,                       the patient's blood pressure, pulse, and oxygen                       saturations were monitored continuously. The Colonoscope                       was introduced through the anus and advanced to the                       terminal ileum. The colonoscopy was performed without                       difficulty. The patient tolerated the procedure well.                       The quality of the bowel preparation was fair.                                                                                   Findings:       The perianal and digital rectal examinations were normal. Pertinent       negatives include normal sphincter tone, no palpable rectal lesions and       normal prostate (size, shape, and consistency).       The terminal ileum appeared normal.       Multiple medium-mouthed diverticula were found in the sigmoid colon, in       the descending colon and in the ascending colon.       The exam was otherwise without abnormality on direct and retroflexion       views.                                                                                   Impression:          - The examined portion of the ileum was normal.                       - Diverticulosis in the sigmoid colon, in the descending                       colon and in the ascending colon.                       - The examination was otherwise normal on direct and         "               retroflexion views.  Recommendation:      - Discharge patient to home (ambulatory).                       - Return to referring physician PRN.                       - Repeat colonoscopy in 7 years for screening purposes.         Objective:   /80   Pulse 53   Temp 97.6  F (36.4  C) (Oral)   Resp 18   Ht 1.702 m (5' 7\")   Wt 69.9 kg (154 lb)   SpO2 99%   BMI 24.12 kg/m    General: Active, alert, nontoxic-appearing.  No acute distress.  HEENT: Normocephalic, atraumatic.  Pupils are equal and round.  Sclera is clear.  Normal external ears. Nares patent.  Moist mucous membranes.    Cardiac: Bradycardic, regular rhythm.  S1, S2 present.  No murmurs, rubs, or gallops.  Respiratory/chest: Clear to auscultation bilaterally.  No wheezes, rales, rhonchi.  Breathing is not labored.  No accessory muscle usage.  Abdomen: Mild abdominal bloating/distention.  No fluid wave.  Hyperactive bowel sounds.  Abdomen is soft, nontender.  No masses or organomegaly noted.  No guarding or rebound tenderness appreciated.  Extremities: Voluntary movements intact.  Integumentary: Mildly pale, though normal skin tone for patient.  No concerning rash or skin changes appreciated.    Amount of time spent in chart review, direct patient contact, care coordination, and related activities to patient care on the day of appointment: 40 minutes.       Stew Saravia MD  [facility] [place] [address]  Phone: [phone]  Fax: [phone]    2/25/2025  1:00 PM            Current Outpatient Medications   Medication Sig Dispense Refill    amoxicillin (AMOXIL) 500 MG capsule Take 4 caps (2000 mg) 1 hour prior to dental procedure.      Blood Pressure Monitoring (B-D ASSURE BPM/AUTO ARM CUFF) MISC 1 Device daily 1 Device 0    Cholecalciferol (VITAMIN D PO) Take 3,000 Units by mouth daily 1 tab daily      CRANBERRY EXTRACT PO Take 2 tablets by mouth daily      ezetimibe (ZETIA) 10 MG tablet Take 1 tablet " (10 mg) by mouth daily 90 tablet 1    lisinopril (ZESTRIL) 10 MG tablet Take 1 tablet (10 mg) by mouth daily. 90 tablet 2    Multiple Vitamins-Minerals (MULTIVITAL PO) Take by mouth daily 1 TABLET DAILY      Probiotic Product (PROBIOTIC GUMMIES PO) Take 1 tablet by mouth 2 times daily      saw palmetto 450 MG CAPS capsule Take 900 mg by mouth daily      vitamin B complex with vitamin C (VITAMIN  B COMPLEX) tablet Take 1 tablet by mouth daily      vitamin B-12 (CYANOCOBALAMIN) 1000 MCG tablet Take 1,000 mcg by mouth three times a week      vitamin C (ASCORBIC ACID) 1000 MG TABS Take 1,000 mg by mouth daily.       No current facility-administered medications for this visit.       Allergies   Allergen Reactions    Aspirin      Early 1990s, took one dose of aspirin + ibuprofen and had severe GI bleed - hospitalization required    Ibuprofen      Early 1990s, took one dose of aspirin + ibuprofen and had severe GI bleed -hospitalization required       Patient Active Problem List    Diagnosis Date Noted    Methylenetetrahydrofolate reductase (MTHFR) deficiency 09/03/2024     Priority: Medium    Anemia, unspecified type 05/22/2024     Priority: Medium    Lesion of pancreas 05/01/2024     Priority: Medium     Likely a cyst. Recommend repeat MRI in 2 years (05/2026). See imaging below.    Narrative & Impression  EXAM: MR RENAL W/O and W CONTRAST  LOCATION: [facility]  DATE: 5/3/2024     INDICATION:  Renal lesion, Lesion of pancreas  COMPARISON: CTA 4/15/2024 and 4/8/2024.  TECHNIQUE: Routine MRI renal protocol including T1 in/out phase, diffusion, multiplane T2, and dynamic T1 with IV contrast.  CONTRAST: Also CT 1/3/2024.     FINDINGS:     RIGHT KIDNEY: A few small scattered benign cysts. Largest is in the central kidney measures 2.0 cm. No follow-up needed for these.     LEFT KIDNEY: Few scattered benign cysts including a 1.7 cm hemorrhagic cyst mid kidney laterally. No enhancement. No concerning  features. Kidney otherwise normal.     PANCREAS: Tiny 5 mm cyst in the inferior aspect of the head of the pancreas corresponds to previous CT findings. Most likely benign and of little significance although a side branch IPMN is in the differential.     ADDITIONAL FINDINGS: No significant findings in the liver, pancreas                                                                      IMPRESSION:     1. Benign renal cysts including a 1.7 cm hemorrhagic cyst left kidney. No follow-up needed for these.  2. Tiny 5 mm cyst inferior head of the pancreas stable since previous CTs. Most likely of little significance although a side branch IPMN is in the differential. Recommend follow-up MRI in 2 years given previously noted stability.      S/P TAVR (transcatheter aortic valve replacement) 04/15/2024     Priority: Medium     Lifelong bacterial endocarditis prophylaxis.      Gastrointestinal hemorrhage with melena 04/15/2024     Priority: Medium    Non-melanoma skin cancer 02/05/2024     Priority: Medium    History of GI bleed 02/05/2024     Priority: Medium     In 1990's after taking aspirin/ibuprofen.      Thrombocytopenia 02/05/2024     Priority: Medium    History of transcatheter aortic valve implantation (LASHAE) 02/05/2024     Priority: Medium    Chronic diastolic (congestive) heart failure (H) 02/01/2024     Priority: Medium    Stage 2 chronic kidney disease 02/01/2024     Priority: Medium    Bicuspid aortic valve 05/16/2023     Priority: Medium    History of coronary artery bypass surgery 12/05/2022     Priority: Medium     Last Assessment & Plan:    Formatting of this note might be different from the original.   Fortunately, he is not had symptoms of exertional angina. The patient is aware of the need for Secondary prevention through aggressive CV risk factor modifications to include: blood pressure control, LDL control and daily exercise (per guidelines), etc.      Tobacco abuse, in remission 12/05/2022      Priority: Medium     Last Assessment & Plan:    Formatting of this note might be different from the original.   Fortunately, he is not smoking at this time.      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   Tylenol if needed.      History of atrial fibrillation 07/11/2019     Priority: Medium     Formatting of this note might be different from the original.   3/12:  s/p MAZE during CABG     7/12:  s/p cardioversion  Formatting of this note might be different from the original.   3/12:  s/p MAZE during CABG     7/12:  s/p cardioversion      Rosacea 07/11/2019     Priority: Medium    Tinnitus 07/11/2019     Priority: Medium     Formatting of this note might be different from the original.   2/18:  Audiogram:  Bilateral moderate high frequency sensorineural hearing loss from 9006-2491 Hz with normal hearing 250-2000 Hz. Speech discrimination ability in quiet is excellent at normal conversational levels. Not a candidate for amplification  Formatting of this note might be different from the original.   2/18:  Audiogram:  Bilateral moderate high frequency sensorineural hearing loss from 8242-0066 Hz with normal hearing 250-2000 Hz. Speech discrimination ability in quiet is excellent at normal conversational levels. Not a candidate for amplification  Formatting of this note might be different from the original.   2/18:  Audiogram:  Bilateral moderate high frequency sensorineural hearing loss from  2218-2192 Hz with normal hearing 250-2000 Hz. Speech discrimination ability in quiet is excellent at normal conversational levels. Not a candidate for amplification  Formatting of this note might be different from the original.   Formatting of this note might be different from the original.    2/18:  Audiogram:  Bilateral moderate high frequency sensorineural hearing loss from 1312-8699 Hz with normal hearing 250-2000 Hz. Speech discrimination ability in quiet is excellent at normal conversational levels. Not a candidate for amplification   Formatting of this note might be different from the original.    2/18:  Audiogram:  Bilateral moderate high frequency sensorineural hearing loss from 7473-9740 Hz with normal hearing 250-2000 Hz. Speech discrimination ability in quiet is excellent at normal conversational levels. Not a candidate for amplification   Formatting of this note might be different from the original.    2/18:  Audiogram:  Bilateral moderate high frequency sensorineural hearing loss from 3081-6708 Hz with normal hearing 250-2000 Hz. Speech discrimination ability in quiet is excellent at normal conversational levels. Not a candidate for amplification      Recurrent UTI 08/01/2018     Priority: Medium     Formatting of this note might be different from the original.   11/17:     12/17:  Triphasic CT Kidneys:  No stones, microlobulated contour of post-inf bladder wall, likely trabeculations; correlate w/ cysto   1/18:  Cysto:  normal  Formatting of this note might be different from the original.   11/17:     12/17:  Triphasic CT Kidneys:  No stones, microlobulated contour of post-inf bladder wall, likely trabeculations; correlate w/ cysto   1/18:  Cysto:  normal      Primary central sleep apnea 02/07/2017     Priority: Medium     Formatting of this note might be different from the original.   5/31/17:  Sleep study titration:  ASV auto w/ EPAP min 5  max 15; PS min 0  max 15; resp events incl  central apneas were controlled at these settings  Formatting of this note might be different from the original.   5/31/17:  Sleep study titration:  ASV auto w/ EPAP min 5  max 15; PS min 0  max 15; resp events incl central apneas were controlled at these settings  Formatting of this note might be different from the original.   Formatting of this note might be different from the original.    5/31/17:  Sleep study titration:  ASV auto w/ EPAP min 5  max 15; PS min 0  max 15; resp events incl central apneas were controlled at these settings   Formatting of this note might be different from the original.    5/31/17:  Sleep study titration:  ASV auto w/ EPAP min 5  max 15; PS min 0  max 15; resp events incl central apneas were controlled at these settings      NSVT (nonsustained ventricular tachycardia) (H)      Priority: Medium    Hyperlipidemia LDL goal <70 03/07/2012     Priority: Medium    Intermediate coronary syndrome (H) 03/07/2012     Priority: Medium    Abnormal stress electrocardiogram test 03/06/2012     Priority: Medium    ASCVD (arteriosclerotic cardiovascular disease) 03/06/2012     Priority: Medium    Chest discomfort 02/09/2012     Priority: Medium    Hyperthyroidism 02/08/2012     Priority: Medium     Was taking supplements from herbalist with iodine in them -   Treated with methimazole  Avoid IV contrast - iodine or supplements      Hypertension goal BP (blood pressure) < 140/90 02/07/2012     Priority: Medium     On lisinopril -   Started in 2003        ADELIA (obstructive sleep apnea) 02/07/2012     Priority: Medium     Sleeps with CPAP -        Atrial fibrillation (H) 02/07/2012     Priority: Medium     New onset 2012 -   Getting ECHO to look at aortic vavle (hx of sclerosis)  On coumadin (hx of ASCVD s/p bypass) from 2012 - 9/2015 (stopped by cardiology)    S/p maze procedure      Aortic sclerosis 10/11/2011     Priority: Medium    Esophageal reflux 10/11/2011     Priority: Medium    Headache  10/11/2011     Priority: Medium     Problem list name updated by automated process. Provider to review      Thyroid nodule 10/11/2011     Priority: Medium     Needs repeat thyroid ultrasound in 2014      Lumbago 11/05/2004     Priority: Medium    Peptic ulcer without hemorrhage or perforation 11/05/2004     Priority: Medium     Formatting of this note might be different from the original.   nSAID-INDUCED         Family History   Problem Relation Age of Onset    Cardiovascular Unknown         aortic aneurysm, CAD    Cancer Mother 86        pancreatic    C.A.D. Mother         passed away at 86    C.A.D. Brother         stent    Diabetes Maternal Grandmother     Hypertension Brother        Past Surgical History:   Procedure Laterality Date    ANESTHESIA CARDIOVERSION  07/02/2012    Procedure: ANESTHESIA CARDIOVERSION;  Anesthesia Off site Cardioversion;  Surgeon: Provider, Generic Anesthesia;  Location: [facility] OR    BYPASS GRAFT ARTERY CORONARY  03/08/2012    Procedure:BYPASS GRAFT ARTERY CORONARY; Median Sternotomy, Coronary Artery Bypass Graft X4 Using the Left Internal Mamary and Left Saphenous Vein with MAZE Procedure, and On Pump Oxygenator.; Surgeon:MICHOACANO HOANG; Location:[facility] OR    COLONOSCOPY N/A 08/16/2016    Procedure: COLONOSCOPY;  Surgeon: Brad Peralta MD;  Location:  GI    COLONOSCOPY N/A 11/10/2016    Procedure: COLONOSCOPY;  Surgeon: Brad Peralta MD;  Location:  GI    ESOPHAGOSCOPY, GASTROSCOPY, DUODENOSCOPY (EGD), COMBINED N/A 04/16/2024    Procedure: ESOPHAGOGASTRODUODENOSCOPY;  Surgeon: Hugo Burnette MD;  [place] [facility] [place]    MAZE PROCEDURE  03/08/2012    Procedure:MAZE PROCEDURE; Surgeon:MICHOACANO HOANG; Location: OR    TONSILLECTOMY      ulcer operation  01/01/1991    [facility] TRANSCATHETER AORTIC VALVE REPLACE (TAVR/LASHAE), W/PROSTH VALVE; TRANSCAVAL APPR  02/01/2024    See [place] Admission from 02/01/2024        Social History     Socioeconomic History    Marital status:       Spouse name: Not on file    Number of children: Not on file    Years of education: Not on file    Highest education level: Not on file   Occupational History    Not on file   Tobacco Use    Smoking status: Former     Current packs/day: 0.00     Types: Cigarettes     Quit date: 1983     Years since quittin.1     Passive exposure: Never    Smokeless tobacco: Never   Vaping Use    Vaping status: Never Used   Substance and Sexual Activity    Alcohol use: Yes     Comment: occ wine on weekends, none since     Drug use: No    Sexual activity: Yes     Partners: Female   Other Topics Concern    Parent/sibling w/ CABG, MI or angioplasty before 65F 55M? No   Social History Narrative    Not on file     Social Drivers of Health     Financial Resource Strain: Low Risk  (9/3/2024)    Financial Resource Strain     Within the past 12 months, have you or your family members you live with been unable to get utilities (heat, electricity) when it was really needed?: No   Food Insecurity: High Risk (9/3/2024)    Food Insecurity     Within the past 12 months, did you worry that your food would run out before you got money to buy more?: No     Within the past 12 months, did the food you bought just not last and you didn t have money to get more?: Yes   Transportation Needs: Low Risk  (9/3/2024)    Transportation Needs     Within the past 12 months, has lack of transportation kept you from medical appointments, getting your medicines, non-medical meetings or appointments, work, or from getting things that you need?: No   Physical Activity: Insufficiently Active (9/3/2024)    Exercise Vital Sign     Days of Exercise per Week: 5 days     Minutes of Exercise per Session: 20 min   Stress: No Stress Concern Present (9/3/2024)    [employer] [place] [employer] - Occupational Stress Questionnaire     Feeling of Stress : Not at all   Social Connections: Unknown (9/3/2024)    Social Connection and Isolation Panel  [NHANES]     Frequency of Communication with Friends and Family: Not on file     Frequency of Social Gatherings with Friends and Family: Once a week     Attends Congregational Services: Not on file     Active Member of Clubs or Organizations: Not on file     Attends Club or Organization Meetings: Not on file     Marital Status: Not on file   Interpersonal Safety: Low Risk  (9/3/2024)    Interpersonal Safety     Do you feel physically and emotionally safe where you currently live?: Yes     Within the past 12 months, have you been hit, slapped, kicked or otherwise physically hurt by someone?: No     Within the past 12 months, have you been humiliated or emotionally abused in other ways by your partner or ex-partner?: No   Housing Stability: Low Risk  (9/3/2024)    Housing Stability     Do you have housing? : Yes     Are you worried about losing your housing?: No

## 2025-03-10 ENCOUNTER — TELEPHONE (OUTPATIENT)
Dept: FAMILY MEDICINE | Facility: CLINIC | Age: 75
End: 2025-03-10
Payer: COMMERCIAL

## 2025-03-10 NOTE — TELEPHONE ENCOUNTER
Order/Referral Request    Who is requesting: Patient    Orders being requested: [facility]    Reason service is needed/diagnosis: Blood in stools    When are orders needed by: ASAP    Has this been discussed with Provider: Yes    Does patient have a preference on a Group/Provider/Facility? [facility].     Does patient have an appointment scheduled?: Per caller, unable to schedule as our referral was inadequate. TC was given phone number [phone] and Fx: [phone]. TC called and got clarification from [place] Referral Team, just missing the referral. TC was asked to send fax to [phone]. Fax has been sent via EPIC release..     Where to send orders: Fax    Could we send this information to you in [facility] or would you prefer to receive a phone call?:   Patient would prefer a phone call   Okay to leave a detailed message?: Yes at Home number on file [phone] (home)

## 2025-04-16 ENCOUNTER — OFFICE VISIT (OUTPATIENT)
Dept: FAMILY MEDICINE | Facility: CLINIC | Age: 75
End: 2025-04-16
Payer: COMMERCIAL

## 2025-04-16 VITALS
BODY MASS INDEX: 24.64 KG/M2 | WEIGHT: 157 LBS | HEART RATE: 55 BPM | DIASTOLIC BLOOD PRESSURE: 83 MMHG | HEIGHT: 67 IN | TEMPERATURE: 97.5 F | RESPIRATION RATE: 16 BRPM | OXYGEN SATURATION: 96 % | SYSTOLIC BLOOD PRESSURE: 137 MMHG

## 2025-04-16 DIAGNOSIS — Z87.19 HISTORY OF GI BLEED: ICD-10-CM

## 2025-04-16 DIAGNOSIS — I50.32 CHRONIC DIASTOLIC (CONGESTIVE) HEART FAILURE (H): ICD-10-CM

## 2025-04-16 DIAGNOSIS — Z95.1 HISTORY OF CORONARY ARTERY BYPASS SURGERY: ICD-10-CM

## 2025-04-16 DIAGNOSIS — I10 HYPERTENSION GOAL BP (BLOOD PRESSURE) < 140/90: Primary | ICD-10-CM

## 2025-04-16 DIAGNOSIS — I48.91 ATRIAL FIBRILLATION, UNSPECIFIED TYPE (H): ICD-10-CM

## 2025-04-16 DIAGNOSIS — Z95.2 S/P TAVR (TRANSCATHETER AORTIC VALVE REPLACEMENT): ICD-10-CM

## 2025-04-16 DIAGNOSIS — N18.2 STAGE 2 CHRONIC KIDNEY DISEASE: ICD-10-CM

## 2025-04-16 DIAGNOSIS — K92.1 MELENA: ICD-10-CM

## 2025-04-16 DIAGNOSIS — E78.5 HYPERLIPIDEMIA LDL GOAL <70: ICD-10-CM

## 2025-04-16 RX ORDER — LISINOPRIL 5 MG/1
5 TABLET ORAL DAILY
Qty: 90 TABLET | Refills: 3 | Status: SHIPPED | OUTPATIENT
Start: 2025-04-16

## 2025-04-16 RX ORDER — EZETIMIBE 10 MG/1
10 TABLET ORAL DAILY
Qty: 90 TABLET | Refills: 3 | Status: SHIPPED | OUTPATIENT
Start: 2025-04-16

## 2025-04-16 NOTE — PATIENT INSTRUCTIONS
-Thank you for choosing the [facility].  -It was a pleasure to see you today.  -Please take a look at the information below for more specific details regarding the treatment plan and recommendations.  -In this after visit summary is a list of your medications and specific instructions.  Please review this carefully as there may be changes made to your medication list.  -If there are any particular questions or concerns, please feel free to reach out to Dr. Saravia.  -If any labs have been completed, we will reach out to you about results.  If the results are normal or not concerning, a letter or [employer] message will be sent to you.  If any follow-up is needed, either Dr. Saravia or the nurse will give you a call.  If you have not heard regarding results after 2 weeks, please reach out to the clinic.    -Dr. Saravia will be leaving the [facility] in May 2025. It has been a pleasure to be a part of your care team.  Thank you for trusting me with your health care. Dr. Saravia recommends that you schedule an appointment to establish care with a new doctor at the clinic.     Patient Instructions:    -Take medications as prescribed.   -Continue to follow with the specialists as you have been doing.   -Keep staying active and eating healthy.     -The RSV vaccine is generally better covered by insurance companies if the vaccine is received at a pharmacy.  Please speak with your pharmacist regarding this vaccination as it is recommended for you.    Please seek immediate medical attention (go to the emergency room or urgent care) for the following reasons: worsening symptoms, or any concerning changes.      --------------------------------------------------------------------------------------------------------------------    -We are always looking for ways to improve.  You may be selected to receive a survey regarding your visit today.  We encourage you to complete the survey and provide specific, constructive  feedback to help us improve our processes.  Thank you for your time!  -Please review the contact information listed on the after visit summary and in the electronic chart.  Below is the phone number that we have on file.  If there are any changes that are needed to be made, please reach out to the clinic.  [phone] (home)

## 2025-04-16 NOTE — PROGRESS NOTES
OFFICE VISIT    Assessment/Plan:     Patient Instructions:    -Take medications as prescribed.   -Continue to follow with the specialists as you have been doing.   -Keep staying active and eating healthy.     -The RSV vaccine is generally better covered by insurance companies if the vaccine is received at a pharmacy.  Please speak with your pharmacist regarding this vaccination as it is recommended for you.    Please seek immediate medical attention (go to the emergency room or urgent care) for the following reasons: worsening symptoms, or any concerning changes.      Wiley was seen today for hypertension.  Diagnoses and all orders for this visit:    Hypertension goal BP (blood pressure) < 140/90  Stage 2 chronic kidney disease  Chronic diastolic (congestive) heart failure (H)  Atrial fibrillation, unspecified type (H)  Hyperlipidemia LDL goal <70  S/P TAVR (transcatheter aortic valve replacement)  History of coronary artery bypass surgery: doing well. Continue medications below. Refills given. Patient will continue to follow with cardiology. Off ASA now due to GI bleed (per cardiology at [place]). Patient has history of sensitivity to medications.   -     lisinopril (ZESTRIL) 5 MG tablet; Take 1 tablet (5 mg) by mouth daily.  -     ezetimibe (ZETIA) 10 MG tablet; Take 1 tablet (10 mg) by mouth daily.    History of GI bleed  Melena: resolved after about 10 days of melena in 02/2025. Continue to monitor.         Return in about 4 months (around 8/16/2025) for Medication follow up, Establish Care with new doctor.    The diagnoses, treatment options, risk, benefits, and recommendations were reviewed with patient/guardian.  Questions were answered to patient's/guardian satisfaction.  Red flag signs were reviewed.  Patient/guardian is in agreement with above plan.      Subjective: 74 year old male with history of hypertension, CHF (diastolic), GI bleed (nonbleeding duodenal ulcer noted on 04/2024 EGD), melena, GERD,  thrombocytopenia, atrial fibrillation/ s/p TAVR (on ASA 81 mg), hypothyroidism, CABG who presents to clinic for the following complaints:   Patient presents with:  Hypertension    Answers submitted by the patient for this visit:  Hypertension Visit (Submitted on 4/16/2025)  Chief Complaint: Chronic problems general questions HPI Form  Do you check your blood pressure regularly outside of the clinic?: Yes  Are your blood pressures ever more than 140 on the top number (systolic) OR more than 90 on the bottom number (diastolic)? (For example, greater than 140/90): No  Are you following a low salt diet?: Yes  General Questionnaire (Submitted on 4/16/2025)  Chief Complaint: Chronic problems general questions HPI Form  How many servings of fruits and vegetables do you eat daily?: 4 or more  How many minutes a day do you exercise enough to make your heart beat faster?: 30 to 60  How many days per week do you miss taking your medication?: 0  Questionnaire about: Chronic problems general questions HPI Form (Submitted on 4/16/2025)  Chief Complaint: Chronic problems general questions HPI Form    HTN: BP is 137/83 today. Currently on lisinopril 5 mg (half of the 10mg tablet) once daily. Denies any significant issues or side effects on the medication.     Home BP recently was 110-120/70-80, HR in the 50's BPM.     During warmer months, he works out a lot and goes outside. He likes going to the gym.     He called the sleep machine company. Patient got a new machine and that helped reduce the BP slightly, too.        4/17/2024  11:11 AM 4/29/2024  4:45 PM 5/7/2024  11:00 AM 5/7/2024  11:37 AM 5/20/2024  1:38 PM 5/20/2024  1:51 PM 5/21/2024  1:23 PM   Vital Signs          Systolic 119  122  146 !  151 !  142 !  149 !  130    Diastolic 64  88  84 !  88 !  87 !  96 (H)  70    Pulse 60  56  54   52         9/3/2024  2:06 PM 9/3/2024  2:20 PM 9/3/2024  2:48 PM 1/16/2025  2:56 PM 1/16/2025  3:07 PM 2/25/2025  9:48 AM 2/25/2025  9:58 AM  "  Vital Signs          Systolic 164 !  164 !  162 !  154 !  161 !  157 !  143 !    Diastolic 92 (H)  82 !  80 !  88 !  83 !  79 !  80 !    Pulse 62    66   53        2/25/2025  10:36 AM 4/16/2025  2:30 PM   Vital Signs     Systolic 114  137    Diastolic 80  83    Pulse  55           Melena: Last seen on 02/25/2025 by this provider when patient had black stools for four days. At that time, Hgb=15.5. Referred to GI, though no notes are noted on chart review.     Since then, the melena has improved.  The melena resolved about a week after the last visit. Was seen by the cardiologist and was told to stop taking the aspirin.     The 10 point review of system is negative except as stated in the HPI.    Allergies were reviewed and updated.    Objective:   /83 (BP Location: Left arm, Patient Position: Sitting, Cuff Size: Adult Regular)   Pulse 55   Temp 97.5  F (36.4  C) (Oral)   Resp 16   Ht 1.702 m (5' 7\")   Wt 71.2 kg (157 lb)   SpO2 96%   BMI 24.59 kg/m    General: Active, alert, nontoxic-appearing.  No acute distress.  HEENT: Normocephalic, atraumatic.  Pupils are equal and round.  Sclera is clear.  Normal external ears. Nares patent.  Moist mucous membranes.    Cardiac: normal skin tone.   Respiratory/chest: Breathing is not labored.  No accessory muscle usage.  Extremities: Voluntary movements intact.  Integumentary: No concerning rash or skin changes appreciated.        Stew Saravia MD  [facility] [place] [address]  Phone: [phone]  Fax: [phone]    4/16/2025  3:19 PM            Current Outpatient Medications   Medication Sig Dispense Refill    amoxicillin (AMOXIL) 500 MG capsule Take 4 caps (2000 mg) 1 hour prior to dental procedure.      Blood Pressure Monitoring (B-D ASSURE BPM/AUTO ARM CUFF) MISC 1 Device daily 1 Device 0    Cholecalciferol (VITAMIN D PO) Take 3,000 Units by mouth daily 1 tab daily      CRANBERRY EXTRACT PO Take 2 tablets by mouth daily      " ezetimibe (ZETIA) 10 MG tablet Take 1 tablet (10 mg) by mouth daily. 90 tablet 3    lisinopril (ZESTRIL) 5 MG tablet Take 1 tablet (5 mg) by mouth daily. 90 tablet 3    Multiple Vitamins-Minerals (MULTIVITAL PO) Take by mouth daily 1 TABLET DAILY      Probiotic Product (PROBIOTIC GUMMIES PO) Take 1 tablet by mouth 2 times daily      saw palmetto 450 MG CAPS capsule Take 900 mg by mouth daily      vitamin B complex with vitamin C (VITAMIN  B COMPLEX) tablet Take 1 tablet by mouth daily      vitamin B-12 (CYANOCOBALAMIN) 1000 MCG tablet Take 1,000 mcg by mouth three times a week      vitamin C (ASCORBIC ACID) 1000 MG TABS Take 1,000 mg by mouth daily.       No current facility-administered medications for this visit.       Allergies   Allergen Reactions    Aspirin      Early 1990s, took one dose of aspirin + ibuprofen and had severe GI bleed - hospitalization required    Ibuprofen      Early 1990s, took one dose of aspirin + ibuprofen and had severe GI bleed -hospitalization required       Patient Active Problem List    Diagnosis Date Noted    Methylenetetrahydrofolate reductase (MTHFR) deficiency 09/03/2024     Priority: Medium    Anemia, unspecified type 05/22/2024     Priority: Medium    Lesion of pancreas 05/01/2024     Priority: Medium     Likely a cyst. Recommend repeat MRI in 2 years (05/2026). See imaging below.    Narrative & Impression  EXAM: MR RENAL W/O and W CONTRAST  LOCATION: [facility]  DATE: 5/3/2024     INDICATION:  Renal lesion, Lesion of pancreas  COMPARISON: CTA 4/15/2024 and 4/8/2024.  TECHNIQUE: Routine MRI renal protocol including T1 in/out phase, diffusion, multiplane T2, and dynamic T1 with IV contrast.  CONTRAST: Also CT 1/3/2024.     FINDINGS:     RIGHT KIDNEY: A few small scattered benign cysts. Largest is in the central kidney measures 2.0 cm. No follow-up needed for these.     LEFT KIDNEY: Few scattered benign cysts including a 1.7 cm hemorrhagic cyst mid kidney  laterally. No enhancement. No concerning features. Kidney otherwise normal.     PANCREAS: Tiny 5 mm cyst in the inferior aspect of the head of the pancreas corresponds to previous CT findings. Most likely benign and of little significance although a side branch IPMN is in the differential.     ADDITIONAL FINDINGS: No significant findings in the liver, pancreas                                                                      IMPRESSION:     1. Benign renal cysts including a 1.7 cm hemorrhagic cyst left kidney. No follow-up needed for these.  2. Tiny 5 mm cyst inferior head of the pancreas stable since previous CTs. Most likely of little significance although a side branch IPMN is in the differential. Recommend follow-up MRI in 2 years given previously noted stability.      S/P TAVR (transcatheter aortic valve replacement) 04/15/2024     Priority: Medium     Lifelong bacterial endocarditis prophylaxis.      Gastrointestinal hemorrhage with melena 04/15/2024     Priority: Medium    Non-melanoma skin cancer 02/05/2024     Priority: Medium    History of GI bleed 02/05/2024     Priority: Medium     In 1990's after taking aspirin/ibuprofen.      Thrombocytopenia 02/05/2024     Priority: Medium    History of transcatheter aortic valve implantation (LASHAE) 02/05/2024     Priority: Medium    Chronic diastolic (congestive) heart failure (H) 02/01/2024     Priority: Medium    Stage 2 chronic kidney disease 02/01/2024     Priority: Medium    Bicuspid aortic valve 05/16/2023     Priority: Medium    History of coronary artery bypass surgery 12/05/2022     Priority: Medium     Last Assessment & Plan:    Formatting of this note might be different from the original.   Fortunately, he is not had symptoms of exertional angina. The patient is aware of the need for Secondary prevention through aggressive CV risk factor modifications to include: blood pressure control, LDL control and daily exercise (per guidelines), etc.      Tobacco  abuse, in remission 12/05/2022     Priority: Medium     Last Assessment & Plan:    Formatting of this note might be different from the original.   Fortunately, he is not smoking at this time.      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   Tylenol if needed.      History of atrial fibrillation 07/11/2019     Priority: Medium     Formatting of this note might be different from the original.   3/12:  s/p MAZE during CABG     7/12:  s/p cardioversion  Formatting of this note might be different from the original.   3/12:  s/p MAZE during CABG     7/12:  s/p cardioversion      Rosacea 07/11/2019     Priority: Medium    Tinnitus 07/11/2019     Priority: Medium     Formatting of this note might be different from the original.   2/18:  Audiogram:  Bilateral moderate high frequency sensorineural hearing loss from 6122-4788 Hz with normal hearing 250-2000 Hz. Speech discrimination ability in quiet is excellent at normal conversational levels. Not a candidate for amplification  Formatting of this note might be different from the original.   2/18:  Audiogram:  Bilateral moderate high frequency sensorineural hearing loss from 9615-0886 Hz with normal hearing 250-2000 Hz. Speech discrimination ability in quiet is excellent at normal conversational levels. Not a candidate for amplification  Formatting of this note might be different from the original.   2/18:  Audiogram:  Bilateral moderate high frequency  sensorineural hearing loss from 3514-0471 Hz with normal hearing 250-2000 Hz. Speech discrimination ability in quiet is excellent at normal conversational levels. Not a candidate for amplification  Formatting of this note might be different from the original.   Formatting of this note might be different from the original.    2/18:  Audiogram:  Bilateral moderate high frequency sensorineural hearing loss from 4756-0925 Hz with normal hearing 250-2000 Hz. Speech discrimination ability in quiet is excellent at normal conversational levels. Not a candidate for amplification   Formatting of this note might be different from the original.    2/18:  Audiogram:  Bilateral moderate high frequency sensorineural hearing loss from 6763-1320 Hz with normal hearing 250-2000 Hz. Speech discrimination ability in quiet is excellent at normal conversational levels. Not a candidate for amplification   Formatting of this note might be different from the original.    2/18:  Audiogram:  Bilateral moderate high frequency sensorineural hearing loss from 5046-0195 Hz with normal hearing 250-2000 Hz. Speech discrimination ability in quiet is excellent at normal conversational levels. Not a candidate for amplification      Recurrent UTI 08/01/2018     Priority: Medium     Formatting of this note might be different from the original.   11/17:     12/17:  Triphasic CT Kidneys:  No stones, microlobulated contour of post-inf bladder wall, likely trabeculations; correlate w/ cysto   1/18:  Cysto:  normal  Formatting of this note might be different from the original.   11/17:     12/17:  Triphasic CT Kidneys:  No stones, microlobulated contour of post-inf bladder wall, likely trabeculations; correlate w/ cysto   1/18:  Cysto:  normal      Primary central sleep apnea 02/07/2017     Priority: Medium     Formatting of this note might be different from the original.   5/31/17:  Sleep study titration:  ASV auto w/ EPAP min 5  max 15; PS  min 0  max 15; resp events incl central apneas were controlled at these settings  Formatting of this note might be different from the original.   5/31/17:  Sleep study titration:  ASV auto w/ EPAP min 5  max 15; PS min 0  max 15; resp events incl central apneas were controlled at these settings  Formatting of this note might be different from the original.   Formatting of this note might be different from the original.    5/31/17:  Sleep study titration:  ASV auto w/ EPAP min 5  max 15; PS min 0  max 15; resp events incl central apneas were controlled at these settings   Formatting of this note might be different from the original.    5/31/17:  Sleep study titration:  ASV auto w/ EPAP min 5  max 15; PS min 0  max 15; resp events incl central apneas were controlled at these settings      NSVT (nonsustained ventricular tachycardia) (H)      Priority: Medium    Hyperlipidemia LDL goal <70 03/07/2012     Priority: Medium    Intermediate coronary syndrome (H) 03/07/2012     Priority: Medium    Abnormal stress electrocardiogram test 03/06/2012     Priority: Medium    ASCVD (arteriosclerotic cardiovascular disease) 03/06/2012     Priority: Medium    Chest discomfort 02/09/2012     Priority: Medium    Hyperthyroidism 02/08/2012     Priority: Medium     Was taking supplements from herbalist with iodine in them -   Treated with methimazole  Avoid IV contrast - iodine or supplements      Hypertension goal BP (blood pressure) < 140/90 02/07/2012     Priority: Medium     On lisinopril -   Started in 2003        ADELIA (obstructive sleep apnea) 02/07/2012     Priority: Medium     Sleeps with CPAP -        Atrial fibrillation (H) 02/07/2012     Priority: Medium     New onset 2012 -   Getting ECHO to look at aortic vavle (hx of sclerosis)  On coumadin (hx of ASCVD s/p bypass) from 2012 - 9/2015 (stopped by cardiology)    S/p maze procedure      Aortic sclerosis 10/11/2011     Priority: Medium    Esophageal reflux 10/11/2011      Priority: Medium    Headache 10/11/2011     Priority: Medium     Problem list name updated by automated process. Provider to review      Thyroid nodule 10/11/2011     Priority: Medium     Needs repeat thyroid ultrasound in 2014      Lumbago 11/05/2004     Priority: Medium    Peptic ulcer without hemorrhage or perforation 11/05/2004     Priority: Medium     Formatting of this note might be different from the original.   nSAID-INDUCED         Family History   Problem Relation Age of Onset    Cardiovascular Unknown         aortic aneurysm, CAD    Cancer Mother 86        pancreatic    C.A.D. Mother         passed away at 86    C.A.D. Brother         stent    Diabetes Maternal Grandmother     Hypertension Brother        Past Surgical History:   Procedure Laterality Date    ANESTHESIA CARDIOVERSION  07/02/2012    Procedure: ANESTHESIA CARDIOVERSION;  Anesthesia Off site Cardioversion;  Surgeon: Provider, Generic Anesthesia;  Location: [facility] OR    BYPASS GRAFT ARTERY CORONARY  03/08/2012    Procedure:BYPASS GRAFT ARTERY CORONARY; Median Sternotomy, Coronary Artery Bypass Graft X4 Using the Left Internal Mamary and Left Saphenous Vein with MAZE Procedure, and On Pump Oxygenator.; Surgeon:MICHOACANO HOANG; Location:[facility] OR    COLONOSCOPY N/A 08/16/2016    Procedure: COLONOSCOPY;  Surgeon: Brad Peralta MD;  Location:  GI    COLONOSCOPY N/A 11/10/2016    Procedure: COLONOSCOPY;  Surgeon: Brad Peralta MD;  Location: [facility]    ESOPHAGOSCOPY, GASTROSCOPY, DUODENOSCOPY (EGD), COMBINED N/A 04/16/2024    Procedure: ESOPHAGOGASTRODUODENOSCOPY;  Surgeon: Hugo Burnette MD;  Location: [facility] OR    MAZE PROCEDURE  03/08/2012    Procedure:MAZE PROCEDURE; Surgeon:MICHOACANO HOANG; Location: OR    TONSILLECTOMY      ulcer operation  01/01/1991    [facility] TRANSCATHETER AORTIC VALVE REPLACE (TAVR/LASHAE), W/PROSTH VALVE; TRANSCAVAL APPR  02/01/2024    See [place] Admission from 02/01/2024        Social History     Socioeconomic  History    Marital status:      Spouse name: Not on file    Number of children: Not on file    Years of education: Not on file    Highest education level: Not on file   Occupational History    Not on file   Tobacco Use    Smoking status: Former     Current packs/day: 0.00     Types: Cigarettes     Quit date: 1983     Years since quittin.3     Passive exposure: Never    Smokeless tobacco: Never   Vaping Use    Vaping status: Never Used   Substance and Sexual Activity    Alcohol use: Yes     Comment: occ wine on weekends, none since     Drug use: No    Sexual activity: Yes     Partners: Female   Other Topics Concern    Parent/sibling w/ CABG, MI or angioplasty before 65F 55M? No   Social History Narrative    Not on file     Social Drivers of Health     Financial Resource Strain: Low Risk  (9/3/2024)    Financial Resource Strain     Within the past 12 months, have you or your family members you live with been unable to get utilities (heat, electricity) when it was really needed?: No   Food Insecurity: High Risk (9/3/2024)    Food Insecurity     Within the past 12 months, did you worry that your food would run out before you got money to buy more?: No     Within the past 12 months, did the food you bought just not last and you didn t have money to get more?: Yes   Transportation Needs: Low Risk  (9/3/2024)    Transportation Needs     Within the past 12 months, has lack of transportation kept you from medical appointments, getting your medicines, non-medical meetings or appointments, work, or from getting things that you need?: No   Physical Activity: Insufficiently Active (9/3/2024)    Exercise Vital Sign     Days of Exercise per Week: 5 days     Minutes of Exercise per Session: 20 min   Stress: No Stress Concern Present (9/3/2024)    [employer] - Occupational Stress Questionnaire     Feeling of Stress : Not at all   Social Connections: Unknown (9/3/2024)    Social  Connection and Isolation Panel [NHANES]     Frequency of Communication with Friends and Family: Not on file     Frequency of Social Gatherings with Friends and Family: Once a week     Attends L[employer] Services: Not on file     Active Member of Clubs or Organizations: Not on file     Attends Club or Organization Meetings: Not on file     Marital Status: Not on file   Interpersonal Safety: Low Risk  (4/16/2025)    Interpersonal Safety     Do you feel physically and emotionally safe where you currently live?: Yes     Within the past 12 months, have you been hit, slapped, kicked or otherwise physically hurt by someone?: No     Within the past 12 months, have you been humiliated or emotionally abused in other ways by your partner or ex-partner?: No   Housing Stability: Low Risk  (9/3/2024)    Housing Stability     Do you have housing? : Yes     Are you worried about losing your housing?: No

## 2025-06-17 NOTE — PROGRESS NOTES
CPAP Follow-Up Visit:    Date on this visit: 6/18/2025    Wiley Storey has a follow-up visit today to review his ASV use for central apnea with Cheyne Taylor respiration.   Comorbid Diagnoses:  CABG x4v (2012 at [facility]), bicuspid aortic valve with stenosis (nonrheumatic) s/p TAVR (02/01/2024), chronic diastolic congestive heart failure, atrial fibrillation (post CABG; resolved after cardioversion; not on anticoagulation), hyperlipidemia, hypertension, central sleep apnea on ASV, hypothyroidism, migraine with aura, CKD stage II, history of tobacco abuse in remission     Previous Study Results:   split PSG 4/9/2012: AHI 23.5/hr with a Cheyne Taylor breathing pattern (most events were hypopneas, only 0.5 min supine sleep and 5.5 min of REM), O2 min 91%. RDI 34.9/hr. CPAP was titrated to 5 and 6 cm with residual central apnea and Cheyne Taylor breathing. He was then switched to ASV with EPAP 5-15, PS 0-15 cm, max pressure 25 cm, rate auto and rise time 2. This was effective with a Quattro medium full face mask. Wt: 170#.  He had another PSG in the early 2000's that also showed central apnea.     TTE 4/9/24: 1. Status post 26mm Harrison Conner 3 Ultra pericardial aortic valve prosthesis (1-FEB-2024).   2. Normal aortic valve tissue prosthesis (by Doppler), prosthesis systolic mean Doppler gradient 8 mmHg, orifice area by Doppler:2.14 cm2.   3. Mild aortic valve periprosthetic regurgitation (anterior).   4. No aortic valve prosthetic regurgitation.   5. Normal left ventricular chamber size, no regional wall motion abnormalities, calculated 2-D biplane volumetric ejection fraction of 58%.   6. Borderline enlarged right ventricular chamber size, borderline reduced systolic function, estimated right ventricular systolic pressure 31 mmHg (right atrial pressure of 5 mmHg).     ResMed auto ASV EPAP 5-15 cm, PS 0-15 cm   30 day usage data 5/18/25-6/16/25:    The compliance data shows that the patient used the CPAP for 30/30  nights, 93% of nights for >4 hours.  The 95th% pressure is 10.5/7 cm (avg 6.4/5.5 cm).  The 95th% leak is 10.1 lpm.  The average nightly usage is 7:20.  The average AHI is 0.2/hr.       Wiley obtained a replacement machine since his last visit. He used to have the smart start function on his old machine.   His migraines stopped after he had a heart valve replaced. He has had a return of mild migraines, maybe coincidentally occurring with getting the new machine. He has been more sleepy and increased blood pressure (although that was before the new machine). He was put on lisinopril 3-4 months ago.        [facility]    Do you use a ASV Machine at home: (Patient-Rptd) No  Overall, on a scale of 0-10 how would you rate your CPAP (0 poor, 10 great):      What type of mask do you use:  Vitera full face mask  Is your mask comfortable:   He says the masks have never worked well. He has a gel pad for his nose. He has to strap it really tight.  If not, why:    How often do you replace supplies:    Is your mask leaking:   not often  If yes, where do you feel it:    How many night per week does the mask leak (0-7):      Do you notice snoring with mask on:   no  Do you notice gasping arousals with mask on:   no  Are you having significant oral or nasal dryness:   yes, dry mouth. Has a retainer in his mouth.  Are you using the humidifier: yes  Does the water chamber run out before the night is over:no  Do you get condensation in the mask or hose: no  Is the pressure setting comfortable:   yes  If not, why:      Typical bedtime:   10:30 PM and reads until 11:30 PM  Sleep latency on PAP therapy:   quickly when reading. 20 min if he does not read  Typical wake time:  7-7:30 AM  Wakes 1 times per night for variable minutes, depends on if his mind turns on. Reason for waking: restroom  How many hours on average per night are you using PAP therapy:  7.5 hrs  How many hours are you sleeping per night:  7-8 hours  Do you feel well rested  in the morning:   not like when he was younger    Naps: daily for 5-10 minutes.  More in the last 1-2 years. Could doze whenever he sits for too long.    Weight change since sleep study: 156 lbs      Past medical/surgical history, family history, social history, medications and allergies were reviewed.      Problem List:  Patient Active Problem List    Diagnosis Date Noted    Methylenetetrahydrofolate reductase (MTHFR) deficiency 09/03/2024     Priority: Medium    Anemia, unspecified type 05/22/2024     Priority: Medium    Lesion of pancreas 05/01/2024     Priority: Medium     Likely a cyst. Recommend repeat MRI in 2 years (05/2026). See imaging below.    Narrative & Impression  EXAM: MR RENAL W/O and W CONTRAST  LOCATION: [facility]  DATE: 5/3/2024     INDICATION:  Renal lesion, Lesion of pancreas  COMPARISON: CTA 4/15/2024 and 4/8/2024.  TECHNIQUE: Routine MRI renal protocol including T1 in/out phase, diffusion, multiplane T2, and dynamic T1 with IV contrast.  CONTRAST: Also CT 1/3/2024.     FINDINGS:     RIGHT KIDNEY: A few small scattered benign cysts. Largest is in the central kidney measures 2.0 cm. No follow-up needed for these.     LEFT KIDNEY: Few scattered benign cysts including a 1.7 cm hemorrhagic cyst mid kidney laterally. No enhancement. No concerning features. Kidney otherwise normal.     PANCREAS: Tiny 5 mm cyst in the inferior aspect of the head of the pancreas corresponds to previous CT findings. Most likely benign and of little significance although a side branch IPMN is in the differential.     ADDITIONAL FINDINGS: No significant findings in the liver, pancreas                                                                      IMPRESSION:     1. Benign renal cysts including a 1.7 cm hemorrhagic cyst left kidney. No follow-up needed for these.  2. Tiny 5 mm cyst inferior head of the pancreas stable since previous CTs. Most likely of little significance although a side  branch IPMN is in the differential. Recommend follow-up MRI in 2 years given previously noted stability.      S/P TAVR (transcatheter aortic valve replacement) 04/15/2024     Priority: Medium     Lifelong bacterial endocarditis prophylaxis.      Gastrointestinal hemorrhage with melena 04/15/2024     Priority: Medium    Non-melanoma skin cancer 02/05/2024     Priority: Medium    History of GI bleed 02/05/2024     Priority: Medium     In 1990's after taking aspirin/ibuprofen.      Thrombocytopenia 02/05/2024     Priority: Medium    History of transcatheter aortic valve implantation (LASHAE) 02/05/2024     Priority: Medium    Chronic diastolic (congestive) heart failure (H) 02/01/2024     Priority: Medium    Stage 2 chronic kidney disease 02/01/2024     Priority: Medium    Bicuspid aortic valve 05/16/2023     Priority: Medium    History of coronary artery bypass surgery 12/05/2022     Priority: Medium     Last Assessment & Plan:    Formatting of this note might be different from the original.   Fortunately, he is not had symptoms of exertional angina. The patient is aware of the need for Secondary prevention through aggressive CV risk factor modifications to include: blood pressure control, LDL control and daily exercise (per guidelines), etc.      Tobacco abuse, in remission 12/05/2022     Priority: Medium     Last Assessment & Plan:    Formatting of this note might be different from the original.   Fortunately, he is not smoking at this time.      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   Tylenol if needed.      History of atrial fibrillation 07/11/2019     Priority: Medium     Formatting of this note might be different from the original.   3/12:  s/p MAZE during CABG     7/12:  s/p cardioversion  Formatting of this note might be different from the original.   3/12:  s/p MAZE during CABG     7/12:  s/p cardioversion      Rosacea 07/11/2019     Priority: Medium    Tinnitus 07/11/2019     Priority: Medium     Formatting of this note might be different from the original.   2/18:  Audiogram:  Bilateral moderate high frequency sensorineural hearing loss from 6832-6554 Hz with normal hearing 250-2000 Hz. Speech discrimination ability in quiet is excellent at normal conversational levels. Not a candidate for amplification  Formatting of this note might be different from the original.   2/18:  Audiogram:  Bilateral moderate high frequency sensorineural hearing loss from 4725-7707 Hz with normal hearing 250-2000 Hz. Speech discrimination ability in quiet is excellent at normal conversational levels. Not a candidate for amplification  Formatting of this note might be different from the original.   2/18:  Audiogram:  Bilateral moderate high frequency sensorineural hearing loss from 9464-2210 Hz with normal hearing 250-2000 Hz. Speech discrimination ability in quiet is excellent at normal conversational levels. Not a candidate for amplification  Formatting of this note might be different from the original.   Formatting of this note might be different from the original.    2/18:  Audiogram:  Bilateral moderate high frequency sensorineural hearing loss from 7740-5199 Hz with normal hearing 250-2000 Hz. Speech discrimination ability in quiet is excellent at normal conversational levels. Not a candidate for amplification   Formatting of this note might be different from the original.    2/18:  Audiogram:  Bilateral  moderate high frequency sensorineural hearing loss from 4572-0385 Hz with normal hearing 250-2000 Hz. Speech discrimination ability in quiet is excellent at normal conversational levels. Not a candidate for amplification   Formatting of this note might be different from the original.    2/18:  Audiogram:  Bilateral moderate high frequency sensorineural hearing loss from 0424-0065 Hz with normal hearing 250-2000 Hz. Speech discrimination ability in quiet is excellent at normal conversational levels. Not a candidate for amplification      Recurrent UTI 08/01/2018     Priority: Medium     Formatting of this note might be different from the original.   11/17:     12/17:  Triphasic CT Kidneys:  No stones, microlobulated contour of post-inf bladder wall, likely trabeculations; correlate w/ cysto   1/18:  Cysto:  normal  Formatting of this note might be different from the original.   11/17:     12/17:  Triphasic CT Kidneys:  No stones, microlobulated contour of post-inf bladder wall, likely trabeculations; correlate w/ cysto   1/18:  Cysto:  normal      Primary central sleep apnea 02/07/2017     Priority: Medium     Formatting of this note might be different from the original.   5/31/17:  Sleep study titration:  ASV auto w/ EPAP min 5  max 15; PS min 0  max 15; resp events incl central apneas were controlled at these settings  Formatting of this note might be different from the original.   5/31/17:  Sleep study titration:  ASV auto w/ EPAP min 5  max 15; PS min 0  max 15; resp events incl central apneas were controlled at these settings  Formatting of this note might be different from the original.   Formatting of this note might be different from the original.    5/31/17:  Sleep study titration:  ASV auto w/ EPAP min 5  max 15; PS min 0  max 15; resp events incl central apneas were controlled at these settings   Formatting of this note might be different from the original.    5/31/17:  Sleep study titration:   ASV auto w/ EPAP min 5  max 15; PS min 0  max 15; resp events incl central apneas were controlled at these settings      NSVT (nonsustained ventricular tachycardia) (H)      Priority: Medium    Hyperlipidemia LDL goal <70 03/07/2012     Priority: Medium    Intermediate coronary syndrome (H) 03/07/2012     Priority: Medium    Abnormal stress electrocardiogram test 03/06/2012     Priority: Medium    ASCVD (arteriosclerotic cardiovascular disease) 03/06/2012     Priority: Medium    Chest discomfort 02/09/2012     Priority: Medium    Hyperthyroidism 02/08/2012     Priority: Medium     Was taking supplements from herbalist with iodine in them -   Treated with methimazole  Avoid IV contrast - iodine or supplements      Hypertension goal BP (blood pressure) < 140/90 02/07/2012     Priority: Medium     On lisinopril -   Started in 2003        ADELIA (obstructive sleep apnea) 02/07/2012     Priority: Medium     Sleeps with CPAP -        Atrial fibrillation (H) 02/07/2012     Priority: Medium     New onset 2012 -   Getting ECHO to look at aortic vavle (hx of sclerosis)  On coumadin (hx of ASCVD s/p bypass) from 2012 - 9/2015 (stopped by cardiology)    S/p maze procedure      Aortic sclerosis 10/11/2011     Priority: Medium    Esophageal reflux 10/11/2011     Priority: Medium    Headache 10/11/2011     Priority: Medium     Problem list name updated by automated process. Provider to review      Thyroid nodule 10/11/2011     Priority: Medium     Needs repeat thyroid ultrasound in 2014      Lumbago 11/05/2004     Priority: Medium    Peptic ulcer without hemorrhage or perforation 11/05/2004     Priority: Medium     Formatting of this note might be different from the original.   nSAID-INDUCED          Impression/Plan:    (G47.31) Primary central sleep apnea  (primary encounter diagnosis), (G47.33) ADELIA (obstructive sleep apnea)  Comment: Wiley is using his new ASV nightly and benefits from use. He has increased sleepiness, migraines  and blood pressure in the last year, the latter of two improved somewhat with getting the new machine. The download shows his apnea is well treated and he generally sleeps pretty well at night. His only new medication is lisinopril which should not contribute to the sleepiness. It is not clear why he has had increases in those symptoms.   Plan: Continue ASV EPAP 5-15 cm, PS 0-15 cm. A prescription was written for new supplies. We reviewed recommendations for cleaning and replacing supplies. We looked at other masks, but he will stay with what he is using.   He was encouraged to let me know if his sleepiness gets worse.        He will follow up with me in about 1 year(s).     36 minutes were spent on the date of the encounter doing chart review, history and exam, documentation and further activities as noted above.     Bennett Goltz, PA-C    CC: No ref. provider found

## 2025-06-18 ENCOUNTER — OFFICE VISIT (OUTPATIENT)
Dept: SLEEP MEDICINE | Facility: CLINIC | Age: 75
End: 2025-06-18
Payer: COMMERCIAL

## 2025-06-18 VITALS
HEART RATE: 51 BPM | SYSTOLIC BLOOD PRESSURE: 131 MMHG | DIASTOLIC BLOOD PRESSURE: 83 MMHG | OXYGEN SATURATION: 96 % | BODY MASS INDEX: 24.48 KG/M2 | WEIGHT: 156 LBS | HEIGHT: 67 IN

## 2025-06-18 DIAGNOSIS — G47.33 OSA (OBSTRUCTIVE SLEEP APNEA): ICD-10-CM

## 2025-06-18 DIAGNOSIS — G47.31 PRIMARY CENTRAL SLEEP APNEA: Primary | ICD-10-CM

## 2025-06-18 PROCEDURE — 3079F DIAST BP 80-89 MM HG: CPT | Performed by: PHYSICIAN ASSISTANT

## 2025-06-18 PROCEDURE — 99214 OFFICE O/P EST MOD 30 MIN: CPT | Performed by: PHYSICIAN ASSISTANT

## 2025-06-18 PROCEDURE — 3075F SYST BP GE 130 - 139MM HG: CPT | Performed by: PHYSICIAN ASSISTANT

## 2025-06-18 ASSESSMENT — SLEEP AND FATIGUE QUESTIONNAIRES
HOW LIKELY ARE YOU TO NOD OFF OR FALL ASLEEP WHILE SITTING INACTIVE IN A PUBLIC PLACE: WOULD NEVER DOZE
HOW LIKELY ARE YOU TO NOD OFF OR FALL ASLEEP WHILE SITTING AND TALKING TO SOMEONE: WOULD NEVER DOZE
HOW LIKELY ARE YOU TO NOD OFF OR FALL ASLEEP WHEN YOU ARE A PASSENGER IN A CAR FOR AN HOUR WITHOUT A BREAK: MODERATE CHANCE OF DOZING
HOW LIKELY ARE YOU TO NOD OFF OR FALL ASLEEP WHILE LYING DOWN TO REST IN THE AFTERNOON WHEN CIRCUMSTANCES PERMIT: HIGH CHANCE OF DOZING
HOW LIKELY ARE YOU TO NOD OFF OR FALL ASLEEP WHILE WATCHING TV: HIGH CHANCE OF DOZING
HOW LIKELY ARE YOU TO NOD OFF OR FALL ASLEEP IN A CAR, WHILE STOPPED FOR A FEW MINUTES IN TRAFFIC: WOULD NEVER DOZE
HOW LIKELY ARE YOU TO NOD OFF OR FALL ASLEEP WHILE SITTING QUIETLY AFTER LUNCH WITHOUT ALCOHOL: MODERATE CHANCE OF DOZING
HOW LIKELY ARE YOU TO NOD OFF OR FALL ASLEEP WHILE SITTING AND READING: MODERATE CHANCE OF DOZING

## 2025-06-18 NOTE — NURSING NOTE
"Chief Complaint   Patient presents with    CPAP Follow Up     CPAP compliance       Initial /83   Pulse 51   Ht 1.702 m (5' 7\")   Wt 70.8 kg (156 lb)   SpO2 96%   BMI 24.43 kg/m   Estimated body mass index is 24.43 kg/m  as calculated from the following:    Height as of this encounter: 1.702 m (5' 7\").    Weight as of this encounter: 70.8 kg (156 lb).    Medication Reconciliation: complete  ESS 12  Diana Chatterjee MA   "

## 2025-06-30 ENCOUNTER — OFFICE VISIT (OUTPATIENT)
Dept: OPTOMETRY | Facility: CLINIC | Age: 75
End: 2025-06-30
Payer: COMMERCIAL

## 2025-06-30 DIAGNOSIS — H01.02A SQUAMOUS BLEPHARITIS OF UPPER AND LOWER EYELIDS OF BOTH EYES: ICD-10-CM

## 2025-06-30 DIAGNOSIS — H52.223 HYPEROPIA OF BOTH EYES WITH REGULAR ASTIGMATISM AND PRESBYOPIA: Primary | ICD-10-CM

## 2025-06-30 DIAGNOSIS — H52.4 HYPEROPIA OF BOTH EYES WITH REGULAR ASTIGMATISM AND PRESBYOPIA: Primary | ICD-10-CM

## 2025-06-30 DIAGNOSIS — H52.03 HYPEROPIA OF BOTH EYES WITH REGULAR ASTIGMATISM AND PRESBYOPIA: Primary | ICD-10-CM

## 2025-06-30 DIAGNOSIS — H01.02B SQUAMOUS BLEPHARITIS OF UPPER AND LOWER EYELIDS OF BOTH EYES: ICD-10-CM

## 2025-06-30 DIAGNOSIS — H25.13 NUCLEAR AGE-RELATED CATARACT, BOTH EYES: ICD-10-CM

## 2025-06-30 ASSESSMENT — REFRACTION_MANIFEST
OS_ADD: +2.50
OS_AXIS: 004
OD_AXIS: 180
OD_CYLINDER: +1.50
OD_ADD: +2.50
OS_CYLINDER: +0.50
OD_SPHERE: -0.50
OS_SPHERE: -0.50

## 2025-06-30 ASSESSMENT — VISUAL ACUITY
OD_PH_SC: 20/25
OS_SC: 20/30
OS_PH_SC+: -3
OD_PH_SC+: -1
OS_PH_SC: 20/25
OD_SC+: -2
OD_SC: 20/40
METHOD: SNELLEN - LINEAR

## 2025-06-30 ASSESSMENT — CONF VISUAL FIELD
OD_INFERIOR_TEMPORAL_RESTRICTION: 0
OS_SUPERIOR_TEMPORAL_RESTRICTION: 0
METHOD: COUNTING FINGERS
OS_NORMAL: 1
OS_INFERIOR_TEMPORAL_RESTRICTION: 0
OS_INFERIOR_NASAL_RESTRICTION: 0
OD_SUPERIOR_NASAL_RESTRICTION: 0
OD_SUPERIOR_TEMPORAL_RESTRICTION: 0
OS_SUPERIOR_NASAL_RESTRICTION: 0
OD_NORMAL: 1
OD_INFERIOR_NASAL_RESTRICTION: 0

## 2025-06-30 ASSESSMENT — CUP TO DISC RATIO
OD_RATIO: 0.25
OS_RATIO: 0.25

## 2025-06-30 ASSESSMENT — SLIT LAMP EXAM - LIDS: COMMENTS: 1+ BLEPH

## 2025-06-30 ASSESSMENT — TONOMETRY
OD_IOP_MMHG: 13
OS_IOP_MMHG: 16
IOP_METHOD: ICARE

## 2025-06-30 NOTE — PROGRESS NOTES
A/P  1.) Hyperopia/Astig/Presbyopia OU  -Currently in OTC readers only  -Corrects well with updated Rx today. Option for PAL/bifocal if desired, otherwise okay to continue as is  -Dilated ocular health unremarkable OU    2.) Cataracts OU  -Not visually significant  -Reviewed with pt, monitor only    3.) Blepharitis OU  -Intermittent left eye irritation from concretions  -Start lid hygiene   -Uses B-Pap at night - consider nighttime gel/blanca though no obvious K stain today    4.) Ocular Migraine  -Longstanding, sometimes associated with facial numbness    Monitor 1-2 years comprehensive, sooner prn    I have confirmed the patient's CC, HPI and reviewed Past Medical History, Past Surgical History, Social History, Family History, Problem List, Medication List and agree with Tech note.     Sinai Arellano, OD FAAO FSLS

## 2025-06-30 NOTE — PATIENT INSTRUCTIONS
Lid scrubs: These can help prevent buildup of debris on eyelids/eyelashes and help reduce inflammation of the eyelids (blepharitis). Use daily.  -Ocusoft Plus lid wipes  -Ocusoft Plus Hypochlor foaming lid cleanser  -Systane lid wipes  -Tranquileyes 1% Tea Tree Eyelid & Facial Cleanser by [employer]  -Avenova Lid   -Cliradex Lid Wipes  -We Love Eyes Foaming Tea Tree Cleanser  -Oasis-LID  N LASH pads.       Warm compresses: Use 1-2x/day for 5-10 minutes over closed eyelids  -Tila mask  -Tranquileyes beaded mask  -Mibo heating pad  -[place] REST & RELIEF Eye Mask (Hot or Cold)  -I-RELIEF Therapy Mask  -OcuTherm Essentials Kit    You can also use a warm wet washcloth - however this frequently loses heat quickly and can dry your skin out a bit so we recommend any of the above re-usable beaded/gel eyemasks      To purchase these products you can look over-the-counter at [facility] or purchase online at the following websites:  -www.Adan/  -wwwTripGems      Consider a gel or ointment overnight with sleep apnea for air protection    Gel drops: (Thicker consistency, may blur vision slightly. Can be used during the day or at night)  -Refresh Celluvisc  -Refresh Liquigel  -Refresh Optive Gel Drops  -Systane Gel Drops  -Blink Gel Drops    Ointments: (Very thick, these moisturize the best but can blur vision. Best used right before bedtime)  -Refresh PM  -Systane Nighttime  -Genteal Gel

## 2025-08-13 ENCOUNTER — TELEPHONE (OUTPATIENT)
Dept: FAMILY MEDICINE | Facility: CLINIC | Age: 75
End: 2025-08-13
Payer: COMMERCIAL

## 2025-08-20 ENCOUNTER — OFFICE VISIT (OUTPATIENT)
Dept: FAMILY MEDICINE | Facility: CLINIC | Age: 75
End: 2025-08-20
Attending: FAMILY MEDICINE
Payer: COMMERCIAL

## 2025-08-20 VITALS
DIASTOLIC BLOOD PRESSURE: 83 MMHG | BODY MASS INDEX: 24.68 KG/M2 | SYSTOLIC BLOOD PRESSURE: 132 MMHG | WEIGHT: 157.25 LBS | TEMPERATURE: 97.7 F | HEIGHT: 67 IN | RESPIRATION RATE: 16 BRPM | HEART RATE: 53 BPM | OXYGEN SATURATION: 99 %

## 2025-08-20 DIAGNOSIS — I25.10 ASCVD (ARTERIOSCLEROTIC CARDIOVASCULAR DISEASE): ICD-10-CM

## 2025-08-20 DIAGNOSIS — E05.90: ICD-10-CM

## 2025-08-20 DIAGNOSIS — Z86.79 HISTORY OF ATRIAL FIBRILLATION: Chronic | ICD-10-CM

## 2025-08-20 DIAGNOSIS — Z87.19 HISTORY OF GI BLEED: ICD-10-CM

## 2025-08-20 DIAGNOSIS — N18.2 STAGE 2 CHRONIC KIDNEY DISEASE: ICD-10-CM

## 2025-08-20 DIAGNOSIS — I10 HYPERTENSION GOAL BP (BLOOD PRESSURE) < 140/90: Primary | ICD-10-CM

## 2025-08-20 DIAGNOSIS — G47.33 OSA (OBSTRUCTIVE SLEEP APNEA): ICD-10-CM

## 2025-08-20 PROBLEM — Z95.2 S/P TAVR (TRANSCATHETER AORTIC VALVE REPLACEMENT): Status: ACTIVE | Noted: 2023-05-16

## 2025-08-20 PROBLEM — D72.819 LEUKOPENIA: Status: RESOLVED | Noted: 2022-11-07 | Resolved: 2025-08-20

## 2025-08-20 PROBLEM — K92.1 GASTROINTESTINAL HEMORRHAGE WITH MELENA: Status: RESOLVED | Noted: 2024-04-15 | Resolved: 2025-08-20

## 2025-08-20 PROBLEM — D69.6 THROMBOCYTOPENIA: Status: ACTIVE | Noted: 2022-03-04

## 2025-08-20 PROBLEM — D64.9 ANEMIA, UNSPECIFIED TYPE: Status: RESOLVED | Noted: 2024-05-22 | Resolved: 2025-08-20

## 2025-08-20 LAB
HOLD SPECIMEN: NORMAL

## 2025-08-20 PROCEDURE — 3079F DIAST BP 80-89 MM HG: CPT | Performed by: FAMILY MEDICINE

## 2025-08-20 PROCEDURE — 85027 COMPLETE CBC AUTOMATED: CPT | Performed by: FAMILY MEDICINE

## 2025-08-20 PROCEDURE — 99215 OFFICE O/P EST HI 40 MIN: CPT | Performed by: FAMILY MEDICINE

## 2025-08-20 PROCEDURE — 82043 UR ALBUMIN QUANTITATIVE: CPT | Performed by: FAMILY MEDICINE

## 2025-08-20 PROCEDURE — 3075F SYST BP GE 130 - 139MM HG: CPT | Performed by: FAMILY MEDICINE

## 2025-08-20 PROCEDURE — 82570 ASSAY OF URINE CREATININE: CPT | Performed by: FAMILY MEDICINE

## 2025-08-20 PROCEDURE — 36415 COLL VENOUS BLD VENIPUNCTURE: CPT | Performed by: FAMILY MEDICINE

## 2025-08-20 PROCEDURE — G2211 COMPLEX E/M VISIT ADD ON: HCPCS | Performed by: FAMILY MEDICINE

## 2025-08-20 PROCEDURE — 80053 COMPREHEN METABOLIC PANEL: CPT | Performed by: FAMILY MEDICINE

## 2025-08-21 LAB
ALBUMIN SERPL BCG-MCNC: 4.5 G/DL (ref 3.5–5.2)
ALP SERPL-CCNC: 57 U/L (ref 40–150)
ALT SERPL W P-5'-P-CCNC: 18 U/L (ref 0–70)
ANION GAP SERPL CALCULATED.3IONS-SCNC: 10 MMOL/L (ref 7–15)
AST SERPL W P-5'-P-CCNC: 33 U/L (ref 0–45)
BILIRUB SERPL-MCNC: 0.7 MG/DL
BUN SERPL-MCNC: 19.3 MG/DL (ref 8–23)
CALCIUM SERPL-MCNC: 9.1 MG/DL (ref 8.8–10.4)
CHLORIDE SERPL-SCNC: 105 MMOL/L (ref 98–107)
CREAT SERPL-MCNC: 1.2 MG/DL (ref 0.67–1.17)
CREAT UR-MCNC: 78.5 MG/DL
EGFRCR SERPLBLD CKD-EPI 2021: 63 ML/MIN/1.73M2
ERYTHROCYTE [DISTWIDTH] IN BLOOD BY AUTOMATED COUNT: 14 % (ref 10–15)
GLUCOSE SERPL-MCNC: 86 MG/DL (ref 70–99)
HCO3 SERPL-SCNC: 24 MMOL/L (ref 22–29)
HCT VFR BLD AUTO: 46.1 % (ref 40–53)
HGB BLD-MCNC: 16 G/DL (ref 13.3–17.7)
MCH RBC QN AUTO: 32.7 PG (ref 26.5–33)
MCHC RBC AUTO-ENTMCNC: 34.7 G/DL (ref 31.5–36.5)
MCV RBC AUTO: 94.3 FL (ref 78–100)
MICROALBUMIN UR-MCNC: <12 MG/L
MICROALBUMIN/CREAT UR: NORMAL MG/G{CREAT}
PLATELET # BLD AUTO: 134 10E3/UL (ref 150–450)
POTASSIUM SERPL-SCNC: 4.1 MMOL/L (ref 3.4–5.3)
PROT SERPL-MCNC: 6.6 G/DL (ref 6.4–8.3)
RBC # BLD AUTO: 4.89 10E6/UL (ref 4.4–5.9)
SODIUM SERPL-SCNC: 139 MMOL/L (ref 135–145)
WBC # BLD AUTO: 6.17 10E3/UL (ref 4–11)

## (undated) DEVICE — SUCTION MANIFOLD NEPTUNE 2 SYS 1 PORT 702-025-000

## (undated) DEVICE — FORCEP BIOPSY 2.3MM DISP COATED 000388

## (undated) DEVICE — TUBING SUCTION MEDI-VAC 1/4"X20' N620A

## (undated) DEVICE — SOL WATER IRRIG 1000ML BOTTLE 2F7114